# Patient Record
Sex: MALE | Race: BLACK OR AFRICAN AMERICAN | NOT HISPANIC OR LATINO | Employment: FULL TIME
[De-identification: names, ages, dates, MRNs, and addresses within clinical notes are randomized per-mention and may not be internally consistent; named-entity substitution may affect disease eponyms.]

---

## 2023-02-12 ENCOUNTER — HEALTH MAINTENANCE LETTER (OUTPATIENT)
Age: 28
End: 2023-02-12

## 2023-03-07 ENCOUNTER — OFFICE VISIT (OUTPATIENT)
Dept: FAMILY MEDICINE | Facility: CLINIC | Age: 28
End: 2023-03-07
Payer: COMMERCIAL

## 2023-03-07 VITALS
SYSTOLIC BLOOD PRESSURE: 138 MMHG | DIASTOLIC BLOOD PRESSURE: 84 MMHG | BODY MASS INDEX: 38.03 KG/M2 | OXYGEN SATURATION: 96 % | TEMPERATURE: 98 F | RESPIRATION RATE: 18 BRPM | WEIGHT: 256.8 LBS | HEART RATE: 76 BPM | HEIGHT: 69 IN

## 2023-03-07 DIAGNOSIS — Z87.440 HISTORY OF KIDNEY INFECTION: ICD-10-CM

## 2023-03-07 DIAGNOSIS — Z11.3 SCREENING FOR STDS (SEXUALLY TRANSMITTED DISEASES): ICD-10-CM

## 2023-03-07 DIAGNOSIS — Z00.00 ROUTINE GENERAL MEDICAL EXAMINATION AT A HEALTH CARE FACILITY: Primary | ICD-10-CM

## 2023-03-07 DIAGNOSIS — Z11.59 NEED FOR HEPATITIS C SCREENING TEST: ICD-10-CM

## 2023-03-07 DIAGNOSIS — Z13.1 SCREENING FOR DIABETES MELLITUS: ICD-10-CM

## 2023-03-07 DIAGNOSIS — R06.83 SNORES: ICD-10-CM

## 2023-03-07 DIAGNOSIS — E66.812 CLASS 2 OBESITY DUE TO EXCESS CALORIES WITHOUT SERIOUS COMORBIDITY WITH BODY MASS INDEX (BMI) OF 38.0 TO 38.9 IN ADULT: ICD-10-CM

## 2023-03-07 DIAGNOSIS — F43.9 STRESS: ICD-10-CM

## 2023-03-07 DIAGNOSIS — Z13.220 SCREENING CHOLESTEROL LEVEL: ICD-10-CM

## 2023-03-07 DIAGNOSIS — D56.9 THALASSEMIA, UNSPECIFIED TYPE: ICD-10-CM

## 2023-03-07 DIAGNOSIS — Z11.4 SCREENING FOR HIV (HUMAN IMMUNODEFICIENCY VIRUS): ICD-10-CM

## 2023-03-07 DIAGNOSIS — E66.09 CLASS 2 OBESITY DUE TO EXCESS CALORIES WITHOUT SERIOUS COMORBIDITY WITH BODY MASS INDEX (BMI) OF 38.0 TO 38.9 IN ADULT: ICD-10-CM

## 2023-03-07 LAB
ANION GAP SERPL CALCULATED.3IONS-SCNC: 12 MMOL/L (ref 7–15)
BUN SERPL-MCNC: 12 MG/DL (ref 6–20)
CALCIUM SERPL-MCNC: 9.6 MG/DL (ref 8.6–10)
CHLORIDE SERPL-SCNC: 107 MMOL/L (ref 98–107)
CHOLEST SERPL-MCNC: 179 MG/DL
CREAT SERPL-MCNC: 1.24 MG/DL (ref 0.67–1.17)
DEPRECATED HCO3 PLAS-SCNC: 22 MMOL/L (ref 22–29)
ERYTHROCYTE [DISTWIDTH] IN BLOOD BY AUTOMATED COUNT: 18.6 % (ref 10–15)
GFR SERPL CREATININE-BSD FRML MDRD: 81 ML/MIN/1.73M2
GLUCOSE SERPL-MCNC: 105 MG/DL (ref 70–99)
HCT VFR BLD AUTO: 37 % (ref 40–53)
HDLC SERPL-MCNC: 45 MG/DL
HGB BLD-MCNC: 11.9 G/DL (ref 13.3–17.7)
LDLC SERPL CALC-MCNC: 117 MG/DL
MCH RBC QN AUTO: 22.1 PG (ref 26.5–33)
MCHC RBC AUTO-ENTMCNC: 32.2 G/DL (ref 31.5–36.5)
MCV RBC AUTO: 69 FL (ref 78–100)
NONHDLC SERPL-MCNC: 134 MG/DL
PLATELET # BLD AUTO: 227 10E3/UL (ref 150–450)
POTASSIUM SERPL-SCNC: 4.3 MMOL/L (ref 3.4–5.3)
RBC # BLD AUTO: 5.38 10E6/UL (ref 4.4–5.9)
SODIUM SERPL-SCNC: 141 MMOL/L (ref 136–145)
TRIGL SERPL-MCNC: 84 MG/DL
WBC # BLD AUTO: 4.1 10E3/UL (ref 4–11)

## 2023-03-07 PROCEDURE — 86780 TREPONEMA PALLIDUM: CPT | Performed by: FAMILY MEDICINE

## 2023-03-07 PROCEDURE — 99385 PREV VISIT NEW AGE 18-39: CPT | Performed by: FAMILY MEDICINE

## 2023-03-07 PROCEDURE — 36415 COLL VENOUS BLD VENIPUNCTURE: CPT | Performed by: FAMILY MEDICINE

## 2023-03-07 PROCEDURE — 85027 COMPLETE CBC AUTOMATED: CPT | Performed by: FAMILY MEDICINE

## 2023-03-07 PROCEDURE — 87591 N.GONORRHOEAE DNA AMP PROB: CPT | Performed by: FAMILY MEDICINE

## 2023-03-07 PROCEDURE — 80061 LIPID PANEL: CPT | Performed by: FAMILY MEDICINE

## 2023-03-07 PROCEDURE — 80048 BASIC METABOLIC PNL TOTAL CA: CPT | Performed by: FAMILY MEDICINE

## 2023-03-07 PROCEDURE — 87389 HIV-1 AG W/HIV-1&-2 AB AG IA: CPT | Performed by: FAMILY MEDICINE

## 2023-03-07 PROCEDURE — 86803 HEPATITIS C AB TEST: CPT | Performed by: FAMILY MEDICINE

## 2023-03-07 PROCEDURE — 87491 CHLMYD TRACH DNA AMP PROBE: CPT | Performed by: FAMILY MEDICINE

## 2023-03-07 ASSESSMENT — PAIN SCALES - GENERAL: PAINLEVEL: NO PAIN (0)

## 2023-03-07 ASSESSMENT — ENCOUNTER SYMPTOMS
NERVOUS/ANXIOUS: 0
COUGH: 0
WEAKNESS: 0
DIZZINESS: 0
EYE PAIN: 0
HEMATOCHEZIA: 0
SHORTNESS OF BREATH: 0
PARESTHESIAS: 0
DYSURIA: 0
CONSTIPATION: 0
HEMATURIA: 0
ABDOMINAL PAIN: 0
PALPITATIONS: 0
JOINT SWELLING: 0
HEARTBURN: 0
FEVER: 0
DIARRHEA: 0
NAUSEA: 0
SORE THROAT: 0
FREQUENCY: 0
CHILLS: 0
HEADACHES: 0
MYALGIAS: 0
ARTHRALGIAS: 0

## 2023-03-07 NOTE — PROGRESS NOTES
SUBJECTIVE:   CC: Sofi is an 27 year old who presents for preventative health visit.   Patient has been advised of split billing requirements and indicates understanding: Yes  Healthy Habits:     Getting at least 3 servings of Calcium per day:  NO    Bi-annual eye exam:  NO    Dental care twice a year:  Yes    Sleep apnea or symptoms of sleep apnea:  Excessive snoring    Diet:  Regular (no restrictions)    Frequency of exercise:  2-3 days/week    Duration of exercise:  45-60 minutes    Taking medications regularly:  Yes    Medication side effects:  None    PHQ-2 Total Score: 1    Additional concerns today:  Yes    He describes having a history of thalassemia, snoring and he reports having about 4 kidney infections over the past 5 years.  He does not know why he has been getting the kidney infections.  He does not have a history of kidney stones that he is aware of.  He reports that his girlfriend has been telling him that he snores very loudly.  She has not mentioned that he stops breathing at night.    Social history: Single.  No children.  He recently moved to the [facility] from [place] where he grew up.  He works at a [employer] center.            Today's PHQ-2 Score:   PHQ-2 ( 1999 Pfizer) 3/7/2023   Q1: Little interest or pleasure in doing things 1   Q2: Feeling down, depressed or hopeless 0   PHQ-2 Score 1   Q1: Little interest or pleasure in doing things Several days   Q2: Feeling down, depressed or hopeless Not at all   PHQ-2 Score 1       Have you ever done Advance Care Planning? (For example, a Health Directive, POLST, or a discussion with a medical provider or your loved ones about your wishes): No, advance care planning information given to patient to review.  Patient declined advance care planning discussion at this time.    Social History     Tobacco Use     Smoking status: Never     Smokeless tobacco: Never   Substance Use Topics     Alcohol use: Not on file         Alcohol Use  "3/7/2023   Prescreen: >3 drinks/day or >7 drinks/week? No   No flowsheet data found.    Last PSA: No results found for: PSA    Reviewed orders with patient. Reviewed health maintenance and updated orders accordingly - Yes  Lab work is in process    Reviewed and updated as needed this visit by clinical staff   Tobacco  Allergies  Meds              Reviewed and updated as needed this visit by Provider     Meds                 Review of Systems   Constitutional: Negative for chills and fever.   HENT: Positive for ear pain. Negative for congestion, hearing loss and sore throat.    Eyes: Negative for pain and visual disturbance.   Respiratory: Negative for cough and shortness of breath.    Cardiovascular: Negative for chest pain, palpitations and peripheral edema.   Gastrointestinal: Negative for abdominal pain, constipation, diarrhea, heartburn, hematochezia and nausea.   Genitourinary: Negative for dysuria, frequency, genital sores, hematuria, impotence, penile discharge and urgency.   Musculoskeletal: Negative for arthralgias, joint swelling and myalgias.   Skin: Negative for rash.   Neurological: Negative for dizziness, weakness, headaches and paresthesias.   Psychiatric/Behavioral: Negative for mood changes. The patient is not nervous/anxious.          OBJECTIVE:   BP (!) 144/85   Pulse 76   Temp 98  F (36.7  C) (Temporal)   Resp 18   Ht 1.74 m (5' 8.5\")   Wt 116.5 kg (256 lb 12.8 oz)   SpO2 96%   BMI 38.48 kg/m      Physical Exam  GENERAL: healthy, alert and no distress  EYES: Eyes grossly normal to inspection, PERRL and conjunctivae and sclerae normal  HENT: ear canals and TM's normal, nose and mouth without ulcers or lesions  NECK: no adenopathy, no asymmetry, masses, or scars and thyroid normal to palpation  RESP: lungs clear to auscultation - no rales, rhonchi or wheezes  CV: regular rate and rhythm, normal S1 S2, no S3 or S4, no murmur, click or rub, no peripheral edema and peripheral pulses " strong  ABDOMEN: soft, nontender, no hepatosplenomegaly, no masses and bowel sounds normal  MS: no gross musculoskeletal defects noted, no edema  SKIN: no suspicious lesions or rashes  NEURO: Normal strength and tone, mentation intact and speech normal  PSYCH: mentation appears normal, affect normal/bright    Diagnostic Test Results:  Labs reviewed in Epic    ASSESSMENT/PLAN:   1. Routine general medical examination at a health care facility  I encouraged him to get regular exercise and eat a healthy diet.  We are going to check fasting labs as listed below.    2. Thalassemia, unspecified type  He is not sure what type of thalassemia that he has, but reports that some of his hemogram results are usually out of the normal range.  - CBC with platelets; Future  - CBC with platelets    3. Snores  I recommended he be evaluated at the sleep clinic for snoring symptoms.  He also has borderline elevated blood pressure.  - Adult Sleep Eval & Management  Referral; Future    4. History of kidney infection  We will be checking a basic metabolic panel as part of his lab work-up.  - Basic metabolic panel  (Ca, Cl, CO2, Creat, Gluc, K, Na, BUN); Future  - Basic metabolic panel  (Ca, Cl, CO2, Creat, Gluc, K, Na, BUN)    5. Stress  He is interested in seeing a therapist for this.  - Adult Mental Health  Referral; Future    6. Screening for HIV (human immunodeficiency virus)  - HIV Antigen Antibody Combo; Future  - HIV Antigen Antibody Combo    7. Need for hepatitis C screening test  - Hepatitis C Screen Reflex to HCV RNA Quant and Genotype; Future  - Hepatitis C Screen Reflex to HCV RNA Quant and Genotype    8. Screening cholesterol level  - Lipid Profile (Chol, Trig, HDL, LDL calc); Future  - Lipid Profile (Chol, Trig, HDL, LDL calc)    9. Screening for diabetes mellitus  - Basic metabolic panel  (Ca, Cl, CO2, Creat, Gluc, K, Na, BUN); Future  - Basic metabolic panel  (Ca, Cl, CO2, Creat, Gluc, K, Na, BUN)    10.  Screening for STDs (sexually transmitted diseases)  - Chlamydia & Gonorrhea by PCR, [facility] - Clinic Collect  - Treponema Abs w Reflex to RPR and Titer; Future  - Treponema Abs w Reflex to RPR and Titer    11. Class 2 obesity due to excess calories without serious comorbidity with body mass index (BMI) of 38.0 to 38.9 in adult  I recommended he work on lifestyle modifications to help with weight loss.      Patient has been advised of split billing requirements and indicates understanding: Yes      COUNSELING:   Reviewed preventive health counseling, as reflected in patient instructions       Regular exercise       Healthy diet/nutrition       Consider Hep C screening for all patients one time for ages 18-79 years       HIV screeninx in teen years, 1x in adult years, and at intervals if high risk        He reports that he has never smoked. He has never used smokeless tobacco.            John Novak, DO  [facility]

## 2023-03-08 LAB
C TRACH DNA SPEC QL PROBE+SIG AMP: NEGATIVE
HCV AB SERPL QL IA: NONREACTIVE
HIV 1+2 AB+HIV1 P24 AG SERPL QL IA: NONREACTIVE
N GONORRHOEA DNA SPEC QL NAA+PROBE: NEGATIVE
T PALLIDUM AB SER QL: NONREACTIVE

## 2023-03-09 ENCOUNTER — TELEPHONE (OUTPATIENT)
Dept: NEPHROLOGY | Facility: CLINIC | Age: 28
End: 2023-03-09
Payer: COMMERCIAL

## 2023-03-09 DIAGNOSIS — N28.9 RENAL IMPAIRMENT: Primary | ICD-10-CM

## 2023-03-09 DIAGNOSIS — R79.89 ELEVATED SERUM CREATININE: Primary | ICD-10-CM

## 2023-03-09 NOTE — TELEPHONE ENCOUNTER
Health Call Center    Phone Message    May a detailed message be left on voicemail: yes    Reason for Call: Order(s): Other:   Reason for requested: labs  Date needed: ASAP  Provider name: Juliette    Pt called and is scheduled for labs on 3/20. He just recently had labs done at his pcp's office and he is wanting to know if those labs would be sufficient or if he would need to do labs again on 3/20. Please reach out to pt to confirm if appt is needed and help him cancel appt it it's not. Thanks    Action Taken: Message routed to:  [facility] Neph    Travel Screening: Not Applicable

## 2023-03-17 NOTE — TELEPHONE ENCOUNTER
DIAGNOSIS: Renal impairment    DATE RECEIVED: 03.30.2023   NOTES STATUS DETAILS   OFFICE NOTE from referring provider Internal 03.09.2023 John Douglas,    OFFICE NOTE from other specialist      *Only VASCULITIS or LUPUS gather office notes for the following     *PULMONARY       *ENT     *DERMATOLOGY     *RHEUMATOLOGY     DISCHARGE SUMMARY from hospital     DISCHARGE REPORT from the ER     MEDICATION LIST     IMAGING  (NEED IMAGES AND REPORTS)     KIDNEY CT SCAN     KIDNEY ULTRASOUND     MR ABDOMEN     NUCLEAR MEDICINE RENAL     LABS     CBC Internal 03.07.2023   CMP Internal 03.07.2023   BMP Internal 03.07.2023   UA     URINE PROTEIN     RENAL PANEL     BIOPSY     KIDNEY BIOPSY

## 2023-03-20 ENCOUNTER — LAB (OUTPATIENT)
Dept: LAB | Facility: CLINIC | Age: 28
End: 2023-03-20
Payer: COMMERCIAL

## 2023-03-20 DIAGNOSIS — R79.89 ELEVATED SERUM CREATININE: ICD-10-CM

## 2023-03-20 LAB
ALBUMIN MFR UR ELPH: 19.3 MG/DL (ref 1–14)
ALBUMIN UR-MCNC: NEGATIVE MG/DL
APPEARANCE UR: CLEAR
BILIRUB UR QL STRIP: NEGATIVE
COLOR UR AUTO: ABNORMAL
CREAT UR-MCNC: 404 MG/DL
CREAT UR-MCNC: 413 MG/DL
GLUCOSE UR STRIP-MCNC: NEGATIVE MG/DL
HGB UR QL STRIP: NEGATIVE
KETONES UR STRIP-MCNC: NEGATIVE MG/DL
LEUKOCYTE ESTERASE UR QL STRIP: NEGATIVE
MICROALBUMIN UR-MCNC: <12 MG/L
MICROALBUMIN/CREAT UR: NORMAL MG/G{CREAT}
MUCOUS THREADS #/AREA URNS LPF: PRESENT /LPF
NITRATE UR QL: NEGATIVE
PH UR STRIP: 5.5 [PH] (ref 5–7)
PROT/CREAT 24H UR: 0.05 MG/MG CR (ref 0–0.2)
RBC URINE: <1 /HPF
SP GR UR STRIP: 1.01 (ref 1–1.03)
UROBILINOGEN UR STRIP-MCNC: NORMAL MG/DL
WBC URINE: 4 /HPF

## 2023-03-20 PROCEDURE — 81001 URINALYSIS AUTO W/SCOPE: CPT | Performed by: PATHOLOGY

## 2023-03-20 PROCEDURE — 84156 ASSAY OF PROTEIN URINE: CPT | Performed by: PATHOLOGY

## 2023-03-20 PROCEDURE — 82570 ASSAY OF URINE CREATININE: CPT | Performed by: STUDENT IN AN ORGANIZED HEALTH CARE EDUCATION/TRAINING PROGRAM

## 2023-03-29 NOTE — PROGRESS NOTES
Nephrology Clinic Visit  Sofi Escobedo MRN: [id number] YOB: 1995  Primary Care Provider: No Ref-Primary, Physician    Video-Visit Details  Patient evaluated by billable video visit  Video Start Time: 8:00AM  Type of service:  Video Visit  Video End Time:8:36 AM  Originating Location (pt. Location): Home  Distant Location (provider location):  Off-site  Platform used for Video Visit: [employer]  ----------------------------------------------------------------------------------------------------------------------  Visit 3/30/2023:  -BP Control: He does have a diagnosis of high blood pressure. He thinks this has been the case for quite some time. BP recently 180/86, 150/93.   --Current Regimen: None, has not been on BP medications in the past either.   -DM Control: No  --Current Regimen: None  -Creatinine Trend: 1.2  -Nocturia: 2-3x   -Hematuria:  -Nephrolithiasis: No  -NSAID Use: No  -Herbal/OTC Medication Use: Olli sleep gummies, Magnesium, Fish oil, Green Food Powder, Multivitamin  -Family History of Kidney Disease: HTN (Cousin w/ HTN, Strokes, CKD), no one else that he can think  -Family/Friends on RRT/Kidney Transplant: No/No    -Other:  --Hx of Thalassemia: He doesn't know much about this. Thinks he was diagnosed when he was 15 or so. Mom's side of the family has this.   --Hx of iron overload:  --2 years ago in [facility] in he had COVID. At that time he had tests done and Erik reported that there was something wrong with his kidneys. He's also had issues with Urinary Tract infection/Flank Pain. He's noted that on occasion. He's also had some lower abdominal discomfort when urinating. The last time this happened was about 2 years ago. He had some fevers associated. He was treated with antibiotics and after 2 days started to feel better. He reports gross hematuria (dark brown urine when his stream starts then normal color then maybe passing some blood clots). Symptoms always occur in  conjunction with urinary tract infections. His last UTI was about 2 years.     Objective:  PAST MEDICAL HISTORY:  No past medical history on file.    PAST SURGICAL HISTORY:  Past Surgical History:   Procedure Laterality Date     ANKLE SURGERY Right 2019    Ligament surgery       MEDICATIONS:  No current outpatient medications    FAMILY MEDICAL HISTORY:   Family History   Problem Relation Age of Onset     Hypertension Mother      Hypertension Father      Diabetes Paternal Grandmother      Hypertension Paternal Grandmother      Diabetes Paternal Grandfather      Hypertension Paternal Uncle      Hypertension Paternal Aunt        PHYSICAL EXAM:   There were no vitals taken for this visit.  GENERAL APPEARANCE: alert and no distress  EYES: nonicteric  HENT: mouth without ulcers or lesions  RESP: lungs clear to auscultation   CV: regular rhythm, normal rate, no rub  ABDOMEN: soft, nontender, normal bowel sounds, no HSM   Extremities: no clubbing, cyanosis, or edema  MS: no evidence of inflammation in joints, no muscle tenderness  SKIN: no rash  NEURO: mentation intact and speech normal  PSYCH: affect normal    LABS REVIEWED BY ME:   ANEMIA  Recent Labs   Lab Test 03/07/23  1240   HGB 11.9*       BMP  Recent Labs   Lab Test 03/07/23  1240      POTASSIUM 4.3   CHLORIDE 107   CO2 22   ANIONGAP 12   BUN 12.0   CR 1.24*   GFRESTIMATED 81       CBC  Recent Labs   Lab Test 03/07/23  1240   HGB 11.9*   WBC 4.1   HCT 37.0*   MCV 69*          DIABETESNo lab results found.    HYPONATREMIANo lab results found.    Invalid input(s): UOSM, OSM    MBD  Recent Labs   Lab Test 03/07/23  1240   SUELLEN 9.6        URINE STUDIES  Recent Labs   Lab Test 03/20/23  1124   COLOR Light Yellow   APPEARANCE Clear   URINEGLC Negative   URINEBILI Negative   URINEKETONE Negative   SG 1.015   UBLD Negative   URINEPH 5.5   PROTEIN Negative   NITRITE Negative   LEUKEST Negative   RBCU <1   WBCU 4     No lab results found.    ADDITIONAL LABS  "ORDERED/REVIEWED BY ME:  See below    Assessment/Plan  CKD Stage G2A1  Established care with [facility] 3/30/2023    Referred for Creatinine of 1.2 on 3/7/2023. He has no proteinuria/albuminuria on 3/20/2023. He has no hematuria/pyuria on UA 3/20/2023. No renal imaging at time of referral. Hx of recurrent UTIs and 2-3x per night nocturia.     Hx of Thalassemia    CKD Etiology (Biopsy Proven: No):  -Hypertensive Nephrosclerosis  -?Recurrent UTIs?  -?Underestimation of eGFR via Creatinine?  -?Anatomic Abnormality leading to recurrent UTIs/Urinary retention?    Plan:  -Need to get BP under control. He reports BP readings in the 150's and having high blood pressure for \"a long time\". He does not have a home cuff. We are going to start a low dose of Nifedpine (30mg qDay) and he has been instructed to get a home blood pressure cuff with a large arm cuff to get accurate BP measures at home. He will send me these readings in 2 weeks for titration if needed.   -Will check a cystatin-c with next routine lab draw  -Obtain renal US w/ PVR to eval renal anatomy and eval for urinary retention  -Avoid NSAIDs/nephrotoxic agetns  -If develops proteinuric kidney disease, start SGLT2i      Anemia of Renal Disease  Hemoglobin: 11.9  Ferritin:  TSAT:    Plan:  -Hx of Thalassemia. No need for IV iron/EPO at this time      Lipid Management in CKD  KDIGO 2013 Guidelines recommend the following for patients with CKD:  Adults >/= 49 yo w/ CKD stage 1 or 2: Statin  Adults >/= 49 yo w/ CKD stage 3-5: Statin + Ezetimibe or Statin alone  Adults 18-49 + 1 of the following (CAD, DM, Ischemic stroke, 10 yr ASCVD risk >10%): Statin    KDIGO guidelines recommend Simvastatin 20mg/Ezetimibe 10mg qDay for patients with CKD G3a-G5 (in line with the SHARP trial). If Simvastatin used alone, 40mg qDay is referenced.   Other options include: Atorvastatin 20mg qDay (4D trial) and Rosuvastatin 10mg qDay (RCYSTAL trial)    Plan:  -No indication for Statin at " this time      Metabolic Acidosis of Renal Disease  Bicarb: 22    Plan:  -No need for NaHCO at this time      Mineral Bone Disease  PTH: N/A  Phos:  Calcium:  Vitamin D:    Plan:  -No need for phos binder at this time    Other:  -Specialty Care Coordination Referral - CKD G1-G3b w/o imminent RRT planning/needs (Yes/no, Date Referral Placed): No  -Med Therapy Management Referral (Yes/No, Date of Referral): No    Return to clinic: 2 months    Yosef Segovia MD   of Medicine  Division of Nephrology and Hypertension  [facility]    50 minutes spent on the date of the encounter doing chart review, history and exam, documentation and further activities as noted above

## 2023-03-30 ENCOUNTER — PRE VISIT (OUTPATIENT)
Dept: NEPHROLOGY | Facility: CLINIC | Age: 28
End: 2023-03-30
Payer: COMMERCIAL

## 2023-03-30 ENCOUNTER — VIRTUAL VISIT (OUTPATIENT)
Dept: NEPHROLOGY | Facility: CLINIC | Age: 28
End: 2023-03-30
Attending: STUDENT IN AN ORGANIZED HEALTH CARE EDUCATION/TRAINING PROGRAM
Payer: COMMERCIAL

## 2023-03-30 DIAGNOSIS — N28.9 RENAL IMPAIRMENT: ICD-10-CM

## 2023-03-30 PROCEDURE — 99204 OFFICE O/P NEW MOD 45 MIN: CPT | Mod: VID | Performed by: STUDENT IN AN ORGANIZED HEALTH CARE EDUCATION/TRAINING PROGRAM

## 2023-03-30 RX ORDER — NIFEDIPINE 30 MG
30 TABLET, EXTENDED RELEASE ORAL DAILY
Qty: 90 TABLET | Refills: 3 | Status: SHIPPED | OUTPATIENT
Start: 2023-03-30 | End: 2023-06-28

## 2023-03-30 NOTE — LETTER
3/30/2023       RE: Sofi Escobedo  [address]  [facility] [address]     Dear Colleague,    Thank you for referring your patient, Sofi Escobedo, to the [facility] [place] [facility]. Please see a copy of my visit note below.        Nephrology Clinic Visit  Sofi Escobedo MRN: [id number] YOB: 1995  Primary Care Provider: No Ref-Primary, Physician    Video-Visit Details  Patient evaluated by billable video visit  Video Start Time: 8:00AM  Type of service:  Video Visit  Video End Time:8:36 AM  Originating Location (pt. Location): Home  Distant Location (provider location):  Off-site  Platform used for Video Visit: [employer]  ----------------------------------------------------------------------------------------------------------------------  Visit 3/30/2023:  -BP Control: He does have a diagnosis of high blood pressure. He thinks this has been the case for quite some time. BP recently 180/86, 150/93.   --Current Regimen: None, has not been on BP medications in the past either.   -DM Control: No  --Current Regimen: None  -Creatinine Trend: 1.2  -Nocturia: 2-3x   -Hematuria:  -Nephrolithiasis: No  -NSAID Use: No  -Herbal/OTC Medication Use: Olli sleep gummies, Magnesium, Fish oil, Green Food Powder, Multivitamin  -Family History of Kidney Disease: HTN (Cousin w/ HTN, Strokes, CKD), no one else that he can think  -Family/Friends on RRT/Kidney Transplant: No/No    -Other:  --Hx of Thalassemia: He doesn't know much about this. Thinks he was diagnosed when he was 15 or so. Mom's side of the family has this.   --Hx of iron overload:  --2 years ago in [facility] in he had COVID. At that time he had tests done and Dr's reported that there was something wrong with his kidneys. He's also had issues with Urinary Tract infection/Flank Pain. He's noted that on occasion. He's also had some lower abdominal  discomfort when urinating. The last time this happened was about 2 years ago. He had some fevers associated. He was treated with antibiotics and after 2 days started to feel better. He reports gross hematuria (dark brown urine when his stream starts then normal color then maybe passing some blood clots). Symptoms always occur in conjunction with urinary tract infections. His last UTI was about 2 years.     Objective:  PAST MEDICAL HISTORY:  No past medical history on file.    PAST SURGICAL HISTORY:  Past Surgical History:   Procedure Laterality Date     ANKLE SURGERY Right 2019    Ligament surgery       MEDICATIONS:  No current outpatient medications    FAMILY MEDICAL HISTORY:   Family History   Problem Relation Age of Onset     Hypertension Mother      Hypertension Father      Diabetes Paternal Grandmother      Hypertension Paternal Grandmother      Diabetes Paternal Grandfather      Hypertension Paternal Uncle      Hypertension Paternal Aunt        PHYSICAL EXAM:   There were no vitals taken for this visit.  GENERAL APPEARANCE: alert and no distress  EYES: nonicteric  HENT: mouth without ulcers or lesions  RESP: lungs clear to auscultation   CV: regular rhythm, normal rate, no rub  ABDOMEN: soft, nontender, normal bowel sounds, no HSM   Extremities: no clubbing, cyanosis, or edema  MS: no evidence of inflammation in joints, no muscle tenderness  SKIN: no rash  NEURO: mentation intact and speech normal  PSYCH: affect normal    LABS REVIEWED BY ME:   ANEMIA  Recent Labs   Lab Test 03/07/23  1240   HGB 11.9*       BMP  Recent Labs   Lab Test 03/07/23  1240      POTASSIUM 4.3   CHLORIDE 107   CO2 22   ANIONGAP 12   BUN 12.0   CR 1.24*   GFRESTIMATED 81       CBC  Recent Labs   Lab Test 03/07/23  1240   HGB 11.9*   WBC 4.1   HCT 37.0*   MCV 69*          DIABETESNo lab results found.    HYPONATREMIANo lab results found.    Invalid input(s): UOSM, OSM    MBD  Recent Labs   Lab Test 03/07/23  1240   SUELLEN 9.6  "       URINE STUDIES  Recent Labs   Lab Test 03/20/23  1124   COLOR Light Yellow   APPEARANCE Clear   URINEGLC Negative   URINEBILI Negative   URINEKETONE Negative   SG 1.015   UBLD Negative   URINEPH 5.5   PROTEIN Negative   NITRITE Negative   LEUKEST Negative   RBCU <1   WBCU 4     No lab results found.    ADDITIONAL LABS ORDERED/REVIEWED BY ME:  See below    Assessment/Plan  CKD Stage G2A1  Establ[facility] care with [facility] 3/30/2023    Referred for Creatinine of 1.2 on 3/7/2023. He has no proteinuria/albuminuria on 3/20/2023. He has no hematuria/pyuria on UA 3/20/2023. No renal imaging at time of referral. Hx of recurrent UTIs and 2-3x per night nocturia.     Hx of Thalassemia    CKD Etiology (Biopsy Proven: No):  -Hypertensive Nephrosclerosis  -?Recurrent UTIs?  -?Underestimation of eGFR via Creatinine?  -?Anatomic Abnormality leading to recurrent UTIs/Urinary retention?    Plan:  -Need to get BP under control. He reports BP readings in the 150's and having high blood pressure for \"a long time\". He does not have a home cuff. We are going to start a low dose of Nifedpine (30mg qDay) and he has been instructed to get a home blood pressure cuff with a large arm cuff to get accurate BP measures at home. He will send me these readings in 2 weeks for titration if needed.   -Will check a cystatin-c with next routine lab draw  -Obtain renal US w/ PVR to eval renal anatomy and eval for urinary retention  -Avoid NSAIDs/nephrotoxic agetns  -If develops proteinuric kidney disease, start SGLT2i      Anemia of Renal Disease  Hemoglobin: 11.9  Ferritin:  TSAT:    Plan:  -Hx of Thalassemia. No need for IV iron/EPO at this time      Lipid Management in CKD  KDIGO 2013 Guidelines recommend the following for patients with CKD:  Adults >/= 51 yo w/ CKD stage 1 or 2: Statin  Adults >/= 51 yo w/ CKD stage 3-5: Statin + Ezetimibe or Statin alone  Adults 18-49 + 1 of the following (CAD, DM, Ischemic stroke, 10 yr ASCVD risk >10%): " Statin    KDIGO guidelines recommend Simvastatin 20mg/Ezetimibe 10mg qDay for patients with CKD G3a-G5 (in line with the SHARP trial). If Simvastatin used alone, 40mg qDay is referenced.   Other options include: Atorvastatin 20mg qDay (4D trial) and Rosuvastatin 10mg qDay (CRYSTAL trial)    Plan:  -No indication for Statin at this time      Metabolic Acidosis of Renal Disease  Bicarb: 22    Plan:  -No need for NaHCO at this time      Mineral Bone Disease  PTH: N/A  Phos:  Calcium:  Vitamin D:    Plan:  -No need for phos binder at this time    Other:  -Specialty Care Coordination Referral - CKD G1-G3b w/o imminent RRT planning/needs (Yes/no, Date Referral Placed): No  -Med Therapy Management Referral (Yes/No, Date of Referral): No    Return to clinic: 2 months    Yosef Segovia MD   of Medicine  Division of Nephrology and Hypertension  [facility]

## 2023-03-30 NOTE — PATIENT INSTRUCTIONS
"It was a pleasure to see you in nephrology clinic today. I've included a brief summary of our discussion and care plan from today's visit below.  _______________________________________________________________________    My recommendations are summarized as follows:  -Keep a Blood Pressure log. Please make sure that you are using a validated blood pressure device (check \"www.validatebp.org\").  -Avoid all NSAID's. Examples include Ibuprofen (Advil, Motrin), naprosyn (Aleve), celebrex among others. Acetaminophen (Tylenol) is ok with maximum dose in 24 hours of 3000mg.  -Healthy lifestyle measures will keep your kidney's functioning at their current best. This includes regular exercise, maintaining a healthy body weight and smoking cessation.   -Please obtain a Blood Pressure cuff that is on the ValidateBP list. Please make sure that it has an extra large cuff to go around your arm. If the cuff going around your arm isn't the right size we won't get accurate blood pressure readings.   -I have sent a prescription for Nifedipine 30mg 1x per day to your pharmacy. Please watch for leg swelling with this medication  -I have requested a renal ultrasound be done for you. Please schedule this in the next few weeks.     Please return to Nephrology Clinic in 2 months to review your progress.     Who do I call with any questions after my visit?  There are multiple ways to contact your nephrology care team:    -To schedule or reschedule an appointment, please call [phone].  -Reach out via [employer]. These messages are answered by your nurse or doctor during business hours and typically in 1-2 days. [employer] messages are best for quick questions/clarifications/updates. Frequently, your doctor or nurse will recommend setting up a follow up appointment to address any significant questions/concerns.  -For urgent questions after business hours, you may reach the on-call Nephrology Fellow by contacting the [facility]  " at [phone].    To schedule imaging:   -Please call [phone]     To schedule your lab appointment at the [facility]:  -Please call [phone]    Sincerely,    Dr. Yosef Segovia   of Medicine  Division of Nephrology and Hypertension  [facility]

## 2023-03-30 NOTE — NURSING NOTE
Is the patient currently in the state of [place]? YES    Visit mode:VIDEO    If the visit is dropped, the patient can be reconnected by: VIDEO VISIT: Text to cell phone: [phone]    Will anyone else be joining the visit? NO      How would you like to obtain your AVS? MyChart    Are changes needed to the allergy or medication list? NO    Reason for visit: New pt & go over test results

## 2023-04-04 ENCOUNTER — TELEPHONE (OUTPATIENT)
Dept: NEPHROLOGY | Facility: CLINIC | Age: 28
End: 2023-04-04
Payer: COMMERCIAL

## 2023-04-04 NOTE — TELEPHONE ENCOUNTER
Sent [employer] (1st Attempt) for the patient to call back and schedule the following:    Appointment type: 2 month follow up   Provider: Juliette  Return date: 6/2023  Specialty phone number: [phone]  Additional appointment(s) needed: lab  Additonal Notes: n/a

## 2023-04-10 ENCOUNTER — ANCILLARY PROCEDURE (OUTPATIENT)
Dept: ULTRASOUND IMAGING | Facility: CLINIC | Age: 28
End: 2023-04-10
Attending: STUDENT IN AN ORGANIZED HEALTH CARE EDUCATION/TRAINING PROGRAM
Payer: COMMERCIAL

## 2023-04-10 DIAGNOSIS — N28.9 RENAL IMPAIRMENT: ICD-10-CM

## 2023-04-10 PROCEDURE — 76770 US EXAM ABDO BACK WALL COMP: CPT | Mod: GC | Performed by: STUDENT IN AN ORGANIZED HEALTH CARE EDUCATION/TRAINING PROGRAM

## 2023-04-12 ASSESSMENT — PATIENT HEALTH QUESTIONNAIRE - PHQ9
10. IF YOU CHECKED OFF ANY PROBLEMS, HOW DIFFICULT HAVE THESE PROBLEMS MADE IT FOR YOU TO DO YOUR WORK, TAKE CARE OF THINGS AT HOME, OR GET ALONG WITH OTHER PEOPLE: NOT DIFFICULT AT ALL
SUM OF ALL RESPONSES TO PHQ QUESTIONS 1-9: 5
SUM OF ALL RESPONSES TO PHQ QUESTIONS 1-9: 5

## 2023-04-13 ENCOUNTER — VIRTUAL VISIT (OUTPATIENT)
Dept: BEHAVIORAL HEALTH | Facility: HOSPITAL | Age: 28
End: 2023-04-13
Attending: FAMILY MEDICINE
Payer: COMMERCIAL

## 2023-04-13 DIAGNOSIS — F43.21 ADJUSTMENT DISORDER WITH DEPRESSED MOOD: Primary | ICD-10-CM

## 2023-04-13 DIAGNOSIS — F43.9 STRESS: ICD-10-CM

## 2023-04-13 PROCEDURE — 90837 PSYTX W PT 60 MINUTES: CPT | Mod: VID | Performed by: COUNSELOR

## 2023-04-13 NOTE — PROGRESS NOTES
"    [facility] Counseling      PATIENT'S NAME: Sofi Escobedo  PREFERRED NAME: Sofi  PRONOUNS:   Male    MRN: [id number]  : 1995  ADDRESS: [address] [place], [place] [id number]  [facility]  [id number]  DATE OF SERVICE: 23  START TIME: 2:00pm  END TIME: 2:55pm  PREFERRED PHONE: [phone]  May we leave a program related message: Yes  SERVICE MODALITY:  Video Visit:      Provider verified identity through the following two step process.  Patient provided:  Patient  and Patient address    Telemedicine Visit: The patient's condition can be safely assessed and treated via synchronous audio and visual telemedicine encounter.      Reason for Telemedicine Visit: Services only offered telehealth    Originating Site (Patient Location): Patient's home    Distant Site (Provider Location): Provider Remote Setting- Home Office    Consent:  The patient/guardian has verbally consented to: the potential risks and benefits of telemedicine (video visit) versus in person care; bill my insurance or make self-payment for services provided; and responsibility for payment of non-covered services.     Patient would like the video invitation sent by:  My Chart    Mode of Communication:  Video Conference via [employer]    Distant Location (Provider):  Off-site    As the provider I attest to compliance with applicable laws and regulations related to telemedicine.    UNIVERSAL ADULT Mental Health DIAGNOSTIC ASSESSMENT    Identifying Information:  Patient is a 27 year old,  individual.  Patient was [facility] for an [facility].  Patient attended the session alone.    Chief Complaint:   The reason for seeking services at this time is: \"Learning to not let things from my past get in my way and learning to live a better life.\". Pt recently realized that he tends to dwell on a specific past relationship and while he wants to have his own family, he finds that he " "has trust issues that get in the way. Pt moved to [place] from [place] about a year ago and has found himself falling back into his old patterns of going to work and not engaging in the other activities he wants to, even on days he has off. Pt moved to [place] to start over, finding that being in [place] reinforced unhelpful patterns and that because of his history there found himself falling back into spaces he didn't really waqnt to engage with. Pt reflected that a lot of his trust issues stem from family dynamics growing up where he felt abandoned or given up on when he tried to be independent. Pt described how this has made him feel like he needs to be \"wanted/appreciated\" and get external validation that he can't get from himself. Pt also recently realized he has been feeling progressively more lonely since the move. Pt also verbalized thinking that he is \"broken\" now because of his past and doesn't feel that he can connect with people now on a deeper level. Pt has also found the culture shift to been challenging too. The problem(s) began around 2020.    Patient has attempted to resolve these concerns in the past through self-help books, and exercise.    Social/Family History:  Patient reported they grew up in other [facility]. They were raised by biological father after his mom dropped them off with their twin sister at the age 7. Parents one or both remarried and still living. Pt has half siblings on both sides of the family, his older half brother is close to him, and he tries to stay in contact with his twin sister.  Patient reported that their childhood was abandoned, lonely, misunderstood/vulnerable, and rough (needing to be on the defense). Patient described their current relationships with family of origin as distant and tense.     The patient describes their cultural background as .  Cultural influences and impact on patient's life structure, values, norms, and healthcare: Urban culture.  " Contextual influences on patient's health include: Contextual Factors: Societal Factors related to his work in [place] and while he likes his work finds it to be generally unrewarding/stressfull. Pt also identified some interpersonal conflict with a couple co-workers which has been causing him some stress as well. These factors will be addressed in the Preliminary Treatment plan. Patient identified their preferred language to be English. Patient reported they does not need the assistance of an  or other support involved in therapy.     Patient reported had no significant delays in developmental tasks.   Patient's highest education level was some college.  Patient identified the following learning problems: none reported.  Modifications will not be used to assist communication in therapy. Patient reports they are  able to understand written materials.    Patient reported the following relationship history as one LTR.  Patient's current relationship status is single for 4-5 month.   Patient identified their sexual orientation as heterosexual.  Patient reported having 0 child(lolita). Patient identified family as part of their support system.  Patient identified the quality of these relationships as fair, though distant.      Patient's current living/housing situation involves staying in own home/apartment.  The immediate members of family and household include himself and they report that housing is stable.    Patient is currently employed fulltime.  Patient reports their finances are obtained through employment. Patient does identify finances as a current stressor.      Patient reported that they have been involved with the legal system.  June 2019 I received a DUI in [place]. No other legal issues before or since. Patient does not report being under probation/ parole/ jurisdiction. They are not under any current court jurisdiction. .    Patient's Strengths and Limitations:  Patient identified the  following strengths or resources that will help them succeed in treatment: commitment to health and well being, exercise routine, neda / spirituality, family support, insight, intelligence, motivation, strong social skills and work ethic. Things that may interfere with the patient's success in treatment include: few friends and lack of family support.     Assessments:  The following assessments were completed by patient for this visit:  PHQ2:       3/7/2023    11:25 AM 3/7/2023    11:24 AM   PHQ-2 ( 1999 [employer])   Q1: Little interest or pleasure in doing things 1 1   Q2: Feeling down, depressed or hopeless 0 0   PHQ-2 Score 1 1   Q1: Little interest or pleasure in doing things Several days Several days   Q2: Feeling down, depressed or hopeless Not at all Not at all   PHQ-2 Score 1 1     PHQ9:       4/12/2023     4:37 PM   PHQ-9 SCORE   PHQ-9 Total Score MyChart 5 (Mild depression)   PHQ-9 Total Score 5     GAD2:       4/12/2023     4:53 PM   BISHOP-2   Feeling nervous, anxious, or on edge 1   Not being able to stop or control worrying 1   BISHOP-2 Total Score 2     CAGE-AID:       4/13/2023     6:32 AM   CAGE-AID Total Score   Total Score 2   Total Score MyChart 2 (A total score of 2 or greater is considered clinically significant)     PROMIS 10-Global Health (only subscores and total score):       4/13/2023     6:31 AM   PROMIS-10 Scores Only   Global Mental Health Score 11   Global Physical Health Score 14   PROMIS TOTAL - SUBSCORES 25     Dell Suicide Severity Rating Scale (Lifetime/Recent)      4/18/2023     2:34 PM   Dell Suicide Severity Rating (Lifetime/Recent)   1. Wish to be Dead (Lifetime) Y   Wish to be Dead Description (Lifetime) older teen or young adult years   1. Wish to be Dead (Past 1 Month) N   2. Non-Specific Active Suicidal Thoughts (Lifetime) N   Frequency (Lifetime) 1   Duration (Lifetime) 1   Controllability (Past 1 Month) 1   Deterrents (Past 1 Month) 0   Reasons for Ideation (Lifetime) 0    Reasons for Ideation (Past 1 Month) 0   Actual Attempt (Lifetime) N   Interrupted Attempts (Lifetime) N   Aborted or Self-Interrupted Attempt (Lifetime) N   Preparatory Acts or Behavior (Lifetime) N   Calculated C-SSRS Risk Score (Lifetime/Recent) No Risk Indicated       Personal and Family Medical History:  Patient does not report a family history of mental health concerns.  Patient reports family history includes Diabetes in his paternal grandfather and paternal grandmother; Hypertension in his father, mother, paternal aunt, paternal grandmother, and paternal uncle..     Patient does report Mental Health Diagnosis and/or Treatment.  Patient Patient reported the following previous diagnoses which include(s): Nothing.  Patient reported symptoms began 2015 after a breakup.  Patient has not received mental health services in the past.  Psychiatric Hospitalizations: none.  Patient denies a history of civil commitment.  Currently, patient is not receiving other mental health services.  These include none.         Patient has had a physical exam to rule out medical causes for current symptoms.  Date of last physical exam was within the past year. Client was encouraged to follow up with PCP if symptoms were to develop. The patient has a [facility] Primary Care Provider, who is named No Ref-Primary, Physician..  Patient reports the following current medical concerns: high blood pressure and no current dental concerns.  Patient denies any issues with pain..   There are not significant appetite / nutritional concerns / weight changes.   Patient does report a history of head injury / trauma / cognitive impairment.  Pt reports falling and hitting his head on concrete as a kid, being knocked unconscious, and did not go to the hospital. Pt denies any issues post concussion or noticing a difference in his ability to think.    Current Outpatient Medications   Medication     NIFEdipine ER (ADALAT CC) 30 MG 24 hr tablet     No  current facility-administered medications for this visit.     Medication Adherence:  Patient reports not taking.  not taking psychiatric medications as prescribed. Client states reason for medication non-adherence as wanting to explore other avenues besides meds to address it. Strategies for addressing obstacles to medication adherence include needing to follow-up with PCP. Client accepted strategies to improve medication adherence.    Patient Allergies:  No Known Allergies    Medical History:  No past medical history on file.      Current Mental Status Exam:   Appearance:  Appropriate    Eye Contact:  Good   Psychomotor:  Normal       Gait / station:  Unable to assess  Attitude / Demeanor: Cooperative  Interested Friendly Pleasant  Speech      Rate / Production: Normal/ Responsive Talkative      Volume:  Normal  volume      Language:  intact  Mood:   Anxious  Normal  Affect:   Appropriate  Blunted    Thought Content: Clear   Thought Process: Coherent  Logical       Associations: No loosening of associations  Insight:   Fair  and Intellectual Insight  Judgment:  Intact   Orientation:  All  Attention/concentration: Good      Substance Use:  Patient did report a family history of substance use concerns; see medical history section for details. Pt reports that most men in his family have issues with Alcohol and other drug issues. Patient has not received chemical dependency treatment in the past.  Patient has not ever been to detox.     Patient is not currently receiving any chemical dependency treatment.           Substance History of use Age of first use Date of last use     Pattern and duration of use (include amounts and frequency)   Alcohol currently use   15 04/02/23 REPORTS SUBSTANCE USE: reports using substance 1 times per month and has 5 mixed drinks at a time.   Patient reports heaviest use was between [age].   Cannabis   used in the past 9 12/01/22 Pt did it socially and is now sober due to not liking it.      Amphetamines   never used     REPORTS SUBSTANCE USE: N/A   Cocaine/crack    never used       REPORTS SUBSTANCE USE: N/A   Hallucinogens never used         REPORTS SUBSTANCE USE: N/A   Inhalants never used         REPORTS SUBSTANCE USE: N/A   Heroin never used         REPORTS SUBSTANCE USE: N/A   Other Opiates never used     REPORTS SUBSTANCE USE: N/A   Benzodiazepine   never used     REPORTS SUBSTANCE USE: N/A   Barbiturates never used     REPORTS SUBSTANCE USE: N/A   Over the counter meds never used     REPORTS SUBSTANCE USE: N/A   Caffeine currently use N/A   REPORTS SUBSTANCE USE: reports using substance 2 times per day and has 1 coffee or energy drink at a time.   Patient reports heaviest use is current use.   Nicotine  currently use 24 04/12/23 REPORTS SUBSTANCE USE: reports using substance 1/5 if a cartridge times per day and has a lot at   at a time.   Patient reports heaviest use was [age].   Other substances not listed above:  Identify:  never used     REPORTS SUBSTANCE USE: N/A     Patient reported the following problems as a result of their substance use: financial problems; relationship problems.    Substance Use: hangovers, driving under the influence and riding with someone under the influence        4/13/2023     6:32 AM   CAGE-AID Total Score   Total Score 2   Total Score MyChart 2 (A total score of 2 or greater is considered clinically significant)       CAGE-AID score  > 1 is a positive screen, suggesting further discussion is needed to determine if evaluation for alcohol or substance abuse is appropriate.  A score > 2 is considered clinically significant, suggesting further evaluation of alcohol or substance-related problems is indicated.      Based on the positive CAGE score and clinical interview there  are not indications of drug or alcohol abuse, but will continue to be monitored for change in therapy.      Significant Losses / Trauma / Abuse / Neglect Issues:   Patient did serve in the  [employer], six years, honorable discharge and no depployment.   There are indications or report of significant loss, trauma, abuse or neglect issues related to: are indications of trauma or loss but client denies these concerns.  Concerns for possible neglect are not present. Pt has experienced a lot of neglect and loss based on his report but he does not identify these experiences as traumatic at this time.    Safety Assessment:   Patient denies current homicidal ideation and behaviors.  Patient denies current self-injurious ideation and behaviors.    Patient denied risk behaviors associated with substance use.  Patient denies any high risk behaviors associated with mental health symptoms.  Patient reports the following current concerns for their personal safety: None.  Patient reports there are not firearms in the house.    History of Safety Concerns:  Patient denied a history of homicidal ideation.     Patient denied a history of personal safety concerns.    Patient denied a history of assaultive behaviors.    Patient denied a history of sexual assault behaviors.     Patient reported a history unsafe motor vehicle operation associated with substance use.  Patient denies any history of high risk behaviors associated with mental health symptoms.  Patient reports the following protective factors: forward or future oriented thinking; dedication to family or friends; safe and stable environment; regular sleep; effectively controls impulses; regular physical activity; sense of belonging; purpose; secure attachment; daily obligations; effective problem solving skills; commitment to well being; financial stability; strong sense of self worth or esteem    Risk Plan:  See Recommendations for Safety and Risk Management Plan    Review of Symptoms per patient report:   Depression: Change in sleep, Lack of interest, Change in energy level, Low self-worth, Ruminations, Feeling sad, down, or depressed, Withdrawn and  occassional crying when it is bad  Brooke:  No Symptoms  Psychosis: No Symptoms  Anxiety: Excessive worry, Physical complaints, such as headaches, stomachaches, muscle tension, Ruminations and existential dread/concerns (what is the point of it all)  Panic:  Palpitations, Tremors, Shortness of breath, Sense of impending doom and happened only once as a kid  Post Traumatic Stress Disorder:  No Symptoms   Eating Disorder: No Symptoms  ADD / ADHD:  No symptoms  Conduct Disorder: No symptoms  Autism Spectrum Disorder: No symptoms  Obsessive Compulsive Disorder: No Symptoms    Patient reports the following compulsive behaviors and treatment history: None endorsed.      Diagnostic Criteria:   311 (F32.9) Unspecified Depressive Disorder, Symptoms characteristic of a depression disorder that caused clinically significant distress or impairment in social, occupational, or other important areas of functioning predominate but do not meet the full criteria for any of the disorders of the major depressive disorders diagnostic class.  Unspecified Anxiety Disorder , Symptoms characteristic of an anxiety disorder that caused clinically significant distress or impairment in social, occupational, or other important areas of functioning predominate but do not meet the full criteria for any of the disorders of the anxiety disorders diagnostic class. Adjustment Disorder  A. The development of emotional or behavioral symptoms in response to an identifiable stressor(s) occurring within 3 months of the onset of the stressor(s)  B. These symptoms or behaviors are clinically significant, as evidenced by one or both of the following:       - Marked distress that is out of proportion to the severity/intensity of the stressor (with consideration for external context & culture)  C. The stress-related disturbance does not meet criteria for another disorder & is not not an exacerbation of another mental disorder  D. The symptoms do not represent  normal bereavement  E. Once the stressor or its consequences have terminated, the symptoms do not persist for more than an additional 6 months       * Adjustment Disorder with Mixed Anxiety and Depressed Mood: The predominant manifestation is a combination of depression and anxiety    Functional Status:  Patient reports the following functional impairments:  health maintenance, management of the household and or completion of tasks, relationship(s), self-care and sense of self-worth.     Nonprogrammatic care:  Patient is requesting basic services to address current mental health concerns.    Clinical Summary:  1. Reason for assessment: establishing DA visit.  2. Psychosocial, Cultural and Contextual Factors: Single, employed, , transplant from [place] ([place]).  3. Principal DSM5 Diagnoses  (Sustained by DSM5 Criteria Listed Above):   Adjustment Disorders  309.28 (F43.23) With mixed anxiety and depressed mood.  4. Other Diagnoses that is relevant to services:   311 (F32.9) Unspecified Depressive Disorder   300.00 (F41.9) Unspecified Anxiety Disorder.  5. Provisional Diagnosis: See above as evidenced by pt presentation and reported symptoms .  6. Prognosis: Expect Improvement and Maintain Current Status / Prevent Deterioration.  7. Likely consequences of symptoms if not treated: Pt endorses concerns that without trx his ability to continue managing his MH effectively could diminish. Furthermore, pt has been feeling progressively more isolated and depressed since moving to [place], which he thinks may start to impact his work performance.  8. Client strengths include:  caring, employed, insightful, intelligent, motivated, open to learning, open to suggestions / feedback, wants to learn, willing to ask questions, willing to relate to others and work history .     Recommendations:     1. Plan for Safety and Risk Management:   Safety and Risk: Recommended that patient call 911 or go to the local ED should  there be a change in any of these risk factors..          Report to child / adult protection services was NA.     2. Patient's identified cultural concerns will be addressed by being taking into account when developing trx pland and engaging in therapy work..     3. Initial Treatment will focus on:    Depressed Mood - low motivation/energy, and symptom awareness.     4. Resources/Service Plan:    services are not indicated.   Modifications to assist communication are not indicated.   Additional disability accommodations are not indicated.      5. Collaboration:   Collaboration / coordination of treatment will be initiated with the following  support professionals: primary care physician.      6.  Referrals:   The following referral(s) will be initiated: none at this time. Next Scheduled Appointment: NA.      A Release of Information has been obtained for the following: NA, pt trx team is [facility].     Emergency Contact, Dad Gomez, was obtained. See EPIC     Clinical Substantiation/medical necessity for the above recommendations:  See above DA.    7. VON:    VON:  Discussed the general effects of drugs and alcohol on health and well-being. Provider gave patient printed information about the effects of chemical use on their health and well being. Recommendations:  None at this time.     8. Records:   These were not available for review at time of assessment.   Information in this assessment was obtained from the medical record and  provided by patient who is a good historian.    Patient will have open access to their mental health medical record.    9.   Interactive Complexity: No      Provider Name/ Credentials:  LADONNA Paez  April 13, 2023

## 2023-04-13 NOTE — PROGRESS NOTES
"[facility]   Mental Health & Addiction Services     Progress Note - Initial Visit    Patient  Name:  Sofi Escobedo Date: 23         Service Type: Individual     Visit Start Time: 1:00pm  Visit End Time: 1:59pm    Visit #: 1    Attendees: Client attended alone    Service Modality:  Video Visit:      Provider verified identity through the following two step process.  Patient provided:  Patient  and Patient address    Telemedicine Visit: The patient's condition can be safely assessed and treated via synchronous audio and visual telemedicine encounter.      Reason for Telemedicine Visit: Services only offered telehealth    Originating Site (Patient Location): Patient's home    Distant Site (Provider Location): Provider Remote Setting- Home Office    Consent:  The patient/guardian has verbally consented to: the potential risks and benefits of telemedicine (video visit) versus in person care; bill my insurance or make self-payment for services provided; and responsibility for payment of non-covered services.     Patient would like the video invitation sent by:  My Chart    Mode of Communication:  Video Conference via Amwell    Distant Location (Provider):  Off-site    As the provider I attest to compliance with applicable laws and regulations related to telemedicine.       DATA:   Interactive Complexity: No   Crisis: No  Extended Session (53+ minutes): PROLONGED SERVICE IN THE OUTPATIENT SETTING REQUIRING DIRECT (FACE-TO-FACE) PATIENT CONTACT BEYOND THE USUAL SERVICE:    - Patient's presenting concerns require more intensive intervention than could be completed within the usual service    - Treatment protocol required additional time to complete, due to the nature of diagnosis being treated.  See Interventions section for details     Presenting Concerns/  Current Stressors:   The reason for seeking services at this time is: \"Learning to not let things from my past get in my way " "and learning to live a better life.\". Pt recently realized that he tends to dwell on a specific past relationship and while he wants to have his own family, he finds that he has trust issues that get in the way. Pt moved to [place] from [place] about a year ago and has found himself falling back into his old patterns of going to work and not engaging in the other activities he wants to, even on days he has off. Pt moved to [place] to start over, finding that being in [place] reinforced unhelpful patterns and that because of his history there found himself falling back into spaces he didn't really waqnt to engage with. Pt reflected that a lot of his trust issues stem from family dynamics growing up where he felt abandoned or given up on when he tried to be independent. Pt described how this has made him feel like he needs to be \"wanted/appreciated\" and get external validation that he can't get from himself. Pt also recently realized he has been feeling progressively more lonely since the move. Pt also verbalized thinking that he is \"broken\" now because of his past and doesn't feel that he can connect with people now on a deeper level. Pt has also found the culture shift to been challenging too. Session took longer then intended due to collecting data for DA and pt presenting concerns.       ASSESSMENT:  Mental Status Assessment:  Appearance:   Appropriate   Eye Contact:   Good   Psychomotor Behavior: Normal   Attitude:   Cooperative  Interested Friendly Pleasant  Orientation:   All  Speech   Rate / Production: Normal/ Responsive   Volume:  Normal   Mood:    Depressed  Normal Sad   Affect:    Appropriate   Thought Content:  Clear   Thought Form:  Coherent  Logical   Insight:    Fair  and Intellectual Insight      Safety Issues and Plan for Safety and Risk Management:     [place] Suicide Severity Rating Scale was not completed this session due to lack of time and will be completed next session.    Patient denies current " fears or concerns for personal safety.  Patient denies current or recent suicidal ideation or behaviors.  Patient denies current or recent homicidal ideation or behaviors.  Patient denies current or recent self injurious behavior or ideation.  Patient denies other safety concerns.  Recommended that patient call 911 or go to the local ED should there be a change in any of these risk factors.  Patient reports there are no firearms in the house.     Diagnostic Criteria:  Adjustment Disorder  A. The development of emotional or behavioral symptoms in response to an identifiable stressor(s) occurring within 3 months of the onset of the stressor(s)  B. These symptoms or behaviors are clinically significant, as evidenced by one or both of the following:       - Marked distress that is out of proportion to the severity/intensity of the stressor (with consideration for external context & culture)  C. The stress-related disturbance does not meet criteria for another disorder & is not not an exacerbation of another mental disorder  D. The symptoms do not represent normal bereavement  E. Once the stressor or its consequences have terminated, the symptoms do not persist for more than an additional 6 months       * Adjustment Disorder with Depressed Mood: The predominant manifestations are symptoms such as low mood, tearfulness, or feelings of hopelessness      DSM5 Diagnoses: (Sustained by DSM5 Criteria Listed Above)  Diagnoses: Adjustment Disorders  309.0 (F43.21) With depressed mood  Psychosocial & Contextual Factors: Pt recently moved to [place] and is dealing with a lot of transitions at home and work.   PROMIS-10 Scores        4/13/2023     6:31 AM   PROMIS-10 Total Score w/o Sub Scores   PROMIS TOTAL - SUBSCORES 25       Intervention:   Psychodynamic- Patient processed internal experiences , Completed through review of safety issues and safety interventions and Educated on treatment planning and started identifying goals and  interventions for treatment plan  Collateral Reports Completed:  Not Applicable      PLAN: (Homework, other):  1. Provider will continue Diagnostic Assessment.  Patient was given the following to do until next session:  Attend next session    2. Provider recommended the following referrals: NA.      3.  Suicide Risk and Safety Concerns were assessed for Sofi Escobedo.    Patient meets the following risk assessment and triage: [place] Suicide Severity Rating Scale not completed do the following reason lack of time and pt did not endorse any SI during session.      Judd Clemons, [facility]  April 13, 2023

## 2023-04-18 ENCOUNTER — VIRTUAL VISIT (OUTPATIENT)
Dept: BEHAVIORAL HEALTH | Facility: HOSPITAL | Age: 28
End: 2023-04-18
Payer: COMMERCIAL

## 2023-04-18 DIAGNOSIS — F43.21 ADJUSTMENT DISORDER WITH DEPRESSED MOOD: Primary | ICD-10-CM

## 2023-04-18 PROCEDURE — 90791 PSYCH DIAGNOSTIC EVALUATION: CPT | Mod: VID | Performed by: COUNSELOR

## 2023-04-18 ASSESSMENT — COLUMBIA-SUICIDE SEVERITY RATING SCALE - C-SSRS
1. IN THE PAST MONTH, HAVE YOU WISHED YOU WERE DEAD OR WISHED YOU COULD GO TO SLEEP AND NOT WAKE UP?: NO
REASONS FOR IDEATION PAST MONTH: DOES NOT APPLY
TOTAL  NUMBER OF ABORTED OR SELF INTERRUPTED ATTEMPTS LIFETIME: NO
ATTEMPT LIFETIME: NO
TOTAL  NUMBER OF INTERRUPTED ATTEMPTS LIFETIME: NO
1. HAVE YOU WISHED YOU WERE DEAD OR WISHED YOU COULD GO TO SLEEP AND NOT WAKE UP?: YES
2. HAVE YOU ACTUALLY HAD ANY THOUGHTS OF KILLING YOURSELF?: NO
6. HAVE YOU EVER DONE ANYTHING, STARTED TO DO ANYTHING, OR PREPARED TO DO ANYTHING TO END YOUR LIFE?: NO
REASONS FOR IDEATION LIFETIME: DOES NOT APPLY

## 2023-04-26 ENCOUNTER — VIRTUAL VISIT (OUTPATIENT)
Dept: BEHAVIORAL HEALTH | Facility: HOSPITAL | Age: 28
End: 2023-04-26
Payer: COMMERCIAL

## 2023-04-26 DIAGNOSIS — F43.21 ADJUSTMENT DISORDER WITH DEPRESSED MOOD: Primary | ICD-10-CM

## 2023-04-26 PROCEDURE — 90834 PSYTX W PT 45 MINUTES: CPT | Mod: VID | Performed by: COUNSELOR

## 2023-04-26 NOTE — PROGRESS NOTES
[facility]                                     Progress Note    Patient Name: Sofi Escobedo  Date: 4/26/23         Service Type: Individual      Session Start Time: 2:03  Session End Time: 2:43     Session Length: 40 minutes    Session #: 2    Attendees: Client attended alone    Service Modality:  Video Visit:      Provider verified identity through the following two step process.  Patient provided:  Patient is known previously to provider    Telemedicine Visit: The patient's condition can be safely assessed and treated via synchronous audio and visual telemedicine encounter.      Reason for Telemedicine Visit: Services only offered telehealth    Originating Site (Patient Location): Patient's home    Distant Site (Provider Location): Provider Remote Setting- Home Office    Consent:  The patient/guardian has verbally consented to: the potential risks and benefits of telemedicine (video visit) versus in person care; bill my insurance or make self-payment for services provided; and responsibility for payment of non-covered services.     Patient would like the video invitation sent by:  My Chart    Mode of Communication:  Video Conference via Amwell    Distant Location (Provider):  Off-site    As the provider I attest to compliance with applicable laws and regulations related to telemedicine.    DATA  Interactive Complexity: No  Crisis: No        Progress Since Last Session (Related to Symptoms / Goals / Homework):   Symptoms: Improving symptoms overall    Homework: none assigned, establishing trx plan visit      Episode of Care Goals: Achieved / completed to satisfaction - PREPARATION (Decided to change - considering how); Intervened by negotiating a change plan and determining options / strategies for behavior change, identifying triggers, exploring social supports, and working towards setting a date to begin behavior change     Current / Ongoing Stressors and Concerns:   Pt is  currently seeking therapy due to ongoing anxiety/depression and to better manage his temper. Pt reported that things have been going better with him since last session and he has been doing a better job at managing his emotions. Pt verbalized that he went on a date last week, which went really well and he felt they connected more then he has with others. Pt processed through how things have been going at work. Pt reflected that he has been working on self-advocating without getting angry. Pt has found people at his work that can be allies in trying to resolve interpersonal conflict and has felt somewhat validated in that some of his co-workers have noticed things that he has. Therapist (writer) and pt co-developed his trx plan. Pt discussed what he has been helpful so far and what he can do going forward to continue working towards his goals. This included finding spaces in which he could meet people to start building a social support network here in [place].       Treatment Objective(s) Addressed in This Session:   identify and compliment positives at least 3 times daily to support positive self-image  identify triggers, thoughts, and current stressors which contribute to feelings of anxiety  identify patterns of escalation  (i.e. tightness in chest, flushed face, increased heart rate, clenched hands, etc.)  identify at least 3 techniques for intervening on the escalation  use cognitive strategies identified in therapy to challenge anxious thoughts  Decrease frequency and intensity of feeling down, depressed, hopeless  Identify negative self-talk and behaviors: challenge core beliefs, myths, and actions  identify two areas of life that you would like to have improved functioning  identify at least 3 self-care activities  Co-developed trx plan     Intervention:   CBT: Reviewed recently behavior patterns  Motivational Interviewing    MI Intervention: Co-Developed Goal: trx plan, Expressed Empathy/Understanding, Supported  Autonomy, Collaboration, Evocation, Open-ended questions, Reflections: simple and complex and Change talk (evoked)     Change Talk Expressed by the Patient: Desire to change Ability to change Reasons to change Need to change Committment to change Activation Taking steps    Provider Response to Change Talk: E - Evoked more info from patient about behavior change, A - Affirmed patient's thoughts, decisions, or attempts at behavior change, R - Reflected patient's change talk and S - Summarized patient's change talk statements    Psychodynamic: Processed through internal-experiences related to anger and frustration management  Solution Focused: Identified immediate areas of concern and potential barriers to success    Assessments completed prior to visit:  PHQ2:       3/7/2023    11:25 AM 3/7/2023    11:24 AM   PHQ-2 ( 1999 [employer])   Q1: Little interest or pleasure in doing things 1 1   Q2: Feeling down, depressed or hopeless 0 0   PHQ-2 Score 1 1   Q1: Little interest or pleasure in doing things Several days Several days   Q2: Feeling down, depressed or hopeless Not at all Not at all   PHQ-2 Score 1 1     PHQ9:       4/12/2023     4:37 PM   PHQ-9 SCORE   PHQ-9 Total Score MyChart 5 (Mild depression)   PHQ-9 Total Score 5     GAD2:       4/12/2023     4:53 PM   BISHOP-2   Feeling nervous, anxious, or on edge 1   Not being able to stop or control worrying 1   BISHOP-2 Total Score 2        ASSESSMENT: Current Emotional / Mental Status (status of significant symptoms):   Risk status (Self / Other harm or suicidal ideation)   Patient denies current fears or concerns for personal safety.   Patient denies current or recent suicidal ideation or behaviors.   Patient denies current or recent homicidal ideation or behaviors.   Patient denies current or recent self injurious behavior or ideation.   Patient denies other safety concerns.   Patient reports there has been no change in risk factors since their last session.     Patient  reports there has been no change in protective factors since their last session.     Recommended that patient call 911 or go to the local ED should there be a change in any of these risk factors.     Appearance:   Appropriate    Eye Contact:   Good    Psychomotor Behavior: Normal    Attitude:   Cooperative  Interested Friendly Pleasant   Orientation:   All   Speech    Rate / Production: Normal/ Responsive    Volume:  Normal    Mood:    Anxious  Normal   Affect:    Appropriate  frustrated    Thought Content:  Clear    Thought Form:  Coherent  Logical    Insight:    Fair  and Intellectual Insight     Medication Review:   No current psychiatric medications prescribed     Medication Compliance:   NA     Changes in Health Issues:   None reported     Chemical Use Review:   Substance Use: Chemical use reviewed, no active concerns identified      Tobacco Use: No change in amount of tobacco use since last session.  Provided encouragement to quit   Patient declined discussion at this time    Diagnosis:  1. Adjustment disorder with depressed mood        Collateral Reports Completed:   Not Applicable    PLAN: (Patient Tasks / Therapist Tasks / Other)  Attend gym 4-5 times per week, and use sauna after each time   Go for walk on days you don't go to gym   Research local shooting clubs/[employer]   Get license switched to [place] (extra credit)   [employer] 50/30/20 budgeting rule (research)     Judd Clemons [facility]                                                         ______________________________________________________________________    Individual Treatment Plan    Patient's Name: Sofi Escobedo  YOB: 1995    Date of Creation: 4/26/23  Date Treatment Plan Last Reviewed/Revised: 4/26/23    DSM5 Diagnoses: Adjustment Disorders  309.0 (F43.21) With depressed mood  Psychosocial / Contextual Factors: Pt recently moved to [place] from [place] and has been dealing with the culture shift. Pt has also been working on trying to  get himself established here which has been challenging.  PROMIS (reviewed every 90 days):   PROMIS-10 Scores        4/13/2023     6:31 AM   PROMIS-10 Total Score w/o Sub Scores   PROMIS TOTAL - SUBSCORES 25       Referral / Collaboration:  Referral to another professional/service is not indicated at this time..    Anticipated number of session for this episode of care: 9-12 sessions  Anticipation frequency of session: Weekly  Anticipated Duration of each session: 38-52 minutes  Treatment plan will be reviewed in 90 days or when goals have been changed.       MeasurableTreatment Goal(s) related to diagnosis / functional impairment(s)  Goal 1: Patient will better manage his anger/frustration, not getting as easily aggravated, and not letting comments get to him as easily.    I will know I've met my goal when I am able to not let others get to me so much.      Objective #A (Patient Action)    Patient will identify triggers, thoughts, and current stressors which contribute to feelings of anxiety  identify patterns of escalation  (i.e. tightness in chest, flushed face, increased heart rate, clenched hands, etc.)  identify at least 3 techniques for intervening on the escalation  use cognitive strategies identified in therapy to challenge anxious thoughts  Increase interest, engagement, and pleasure in doing things  Decrease frequency and intensity of feeling down, depressed, hopeless  Identify negative self-talk and behaviors: challenge core beliefs, myths, and actions  Improve concentration, focus, and mindfulness in daily activities   identify 3 coping strategies for improving mood  learn at least 3 self-regulation strategies.  Status: New - Date: 4/26/23     Intervention(s)  Therapist will assign homework related to target objective with the intent to promote pt autonomy and better manage MH.  Facilitate increase in self-awareness  Provide psycho-education on emotion identification/regulation and coping  skills  Teach/promote utilization of mindfulness skills and grounding    Goal 2: Patient will feel worthy and be able to find validation internally.     I will know I've met my goal when able to feel more complete or worthy without needing it externally.      Objective #A (Patient Action)    Status: New - Date: 4/26/23     Patient will identify and compliment positives at least 3 times daily to support positive self-image  Decrease frequency and intensity of feeling down, depressed, hopeless  Identify negative self-talk and behaviors: challenge core beliefs, myths, and actions  identify 5 traits or charcteristics you like about yourself  identify at least 3 self-care activities.    Intervention(s)  Therapist will assign homework related to target objective with the intent to promote pt autonomy and better manage MH.  Facilitate increase in self-awareness  Provide psycho-education on self-care/compassion and narrative therapy interventions  Teach/promote utilization of reframing and boundary setting    Objective #B Being more authentic with myself and others through improving my self-esteem.   Patient will participate in social activities to improve mood  learn & utilize at least 3 assertive communication skills weekly  identify 5 traits or charcteristics you like about yourself  identify at least 3 adaptive coping statements to counteract negative thoughts.    Status: New - Date: 4/26/23     Intervention(s)  Therapist will assign homework related to target objective with the intent to promote pt autonomy and better manage MH.  Facilitate increase in self-awareness  Provide psycho-education on self-esteem building and self-exploration  Teach/promote utilization of positive self-affirmations and critical observation skills    Patient has reviewed and agreed to the above plan.      LADONNA Paez  April 26, 2023

## 2023-05-10 ENCOUNTER — VIRTUAL VISIT (OUTPATIENT)
Dept: BEHAVIORAL HEALTH | Facility: CLINIC | Age: 28
End: 2023-05-10
Payer: COMMERCIAL

## 2023-05-10 DIAGNOSIS — F43.21 ADJUSTMENT DISORDER WITH DEPRESSED MOOD: Primary | ICD-10-CM

## 2023-05-10 PROCEDURE — 90837 PSYTX W PT 60 MINUTES: CPT | Mod: VID | Performed by: COUNSELOR

## 2023-05-10 NOTE — PROGRESS NOTES
[facility]                                     Progress Note    Patient Name: Sofi Escobedo  Date: 5/10/23         Service Type: Individual      Session Start Time: 2:02  Session End Time: 3:00     Session Length: 58 minutes    Session #: 3    Attendees: Client attended alone    Service Modality:  Video Visit:      Provider verified identity through the following two step process.  Patient provided:  Patient is known previously to provider    Telemedicine Visit: The patient's condition can be safely assessed and treated via synchronous audio and visual telemedicine encounter.      Reason for Telemedicine Visit: Services only offered telehealth    Originating Site (Patient Location): Patient's home    Distant Site (Provider Location): Provider Remote Setting- Home Office    Consent:  The patient/guardian has verbally consented to: the potential risks and benefits of telemedicine (video visit) versus in person care; bill my insurance or make self-payment for services provided; and responsibility for payment of non-covered services.     Patient would like the video invitation sent by:  My Chart    Mode of Communication:  Video Conference via [employer]    Distant Location (Provider):  Off-site    As the provider I attest to compliance with applicable laws and regulations related to telemedicine.    DATA  Interactive Complexity: No  Crisis: No  Extended Session (53+ minutes): PROLONGED SERVICE IN THE OUTPATIENT SETTING REQUIRING DIRECT (FACE-TO-FACE) PATIENT CONTACT BEYOND THE USUAL SERVICE:    - Patient's presenting concerns require more intensive intervention than could be completed within the usual service     Progress Since Last Session (Related to Symptoms / Goals / Homework):   Symptoms: No change in symptoms, maintained gains    Homework: Partially completed      Episode of Care Goals: Satisfactory progress - ACTION (Actively working towards change); Intervened by reinforcing change  plan / affirming steps taken     Current / Ongoing Stressors and Concerns:   Pt is currently seeking therapy due to ongoing anxiety/depression and to better manage his temper. Pt reported that things have been challenging recently with his dad having been through a major work accident and dealing with some romantic relationship challenges. Pt reflected that he is worried about his dad and is going to be seeing him at the beginning of next month for his birthday for a week. Pt processed through how things have been going in his romantic relationship situation. Pt reflected how he has never really dated and that trying to navigate his feelings has been difficult due to worries about internalizing rejection. Pt acknowledged that he is not responsible for the decisions of others and that sacrificing his needs is not a good foundation for a supportive relationship. Pt also agreed that he needs to work on being more honest with his partners and up front with what he is looking for, instead of trying to adjust/change to meet the expectations of others. Furthermore, after discussing it pt identified that he needs to work on setting boundaries when it comes to determining if he is just being friends with a woman or being in a relationship. Pt briefly talked about how his sister who lived in [place] has moved back to [place], which he isn't feeling great about. Pt also reviewed how things were going at work and confirmed that he will be attending moderation with his one supervisor as the situation is not improved, despite him trying to not engage with them when possible. Session took longer then anticipated due to presenting issues.     Treatment Objective(s) Addressed in This Session:   identify and compliment positives at least 3 times daily to support positive self-image  identify triggers, thoughts, and current stressors which contribute to feelings of anxiety  identify patterns of escalation  (i.e. tightness in chest,  flushed face, increased heart rate, clenched hands, etc.)  identify at least 3 techniques for intervening on the escalation  use cognitive strategies identified in therapy to challenge anxious thoughts  Decrease frequency and intensity of feeling down, depressed, hopeless  Identify negative self-talk and behaviors: challenge core beliefs, myths, and actions  identify two areas of life that you would like to have improved functioning  identify at least 3 self-care activities     Intervention:   CBT: Reviewed recently behavior patterns and reinforcing cycle  Motivational Interviewing    MI Intervention: Expressed Empathy/Understanding, Supported Autonomy, Collaboration, Evocation, Open-ended questions, Reflections: simple and complex, Change talk (evoked) and Reframe     Change Talk Expressed by the Patient: Desire to change Ability to change Reasons to change Need to change Committment to change Activation Taking steps    Provider Response to Change Talk: E - Evoked more info from patient about behavior change, A - Affirmed patient's thoughts, decisions, or attempts at behavior change, R - Reflected patient's change talk and S - Summarized patient's change talk statements    Psychodynamic: Processed through internal-experiences related to anger and frustration management  Solution Focused: Identified immediate areas of concern and potential barriers to success in relationships    Assessments completed prior to visit:  PHQ2:       3/7/2023    11:25 AM 3/7/2023    11:24 AM   PHQ-2 ( 1999 [employer])   Q1: Little interest or pleasure in doing things 1 1   Q2: Feeling down, depressed or hopeless 0 0   PHQ-2 Score 1 1   Q1: Little interest or pleasure in doing things Several days Several days   Q2: Feeling down, depressed or hopeless Not at all Not at all   PHQ-2 Score 1 1     PHQ9:       4/12/2023     4:37 PM   PHQ-9 SCORE   PHQ-9 Total Score MyChart 5 (Mild depression)   PHQ-9 Total Score 5     GAD2:       4/12/2023      4:53 PM   BISHOP-2   Feeling nervous, anxious, or on edge 1   Not being able to stop or control worrying 1   BISHOP-2 Total Score 2        ASSESSMENT: Current Emotional / Mental Status (status of significant symptoms):   Risk status (Self / Other harm or suicidal ideation)   Patient denies current fears or concerns for personal safety.   Patient denies current or recent suicidal ideation or behaviors.   Patient denies current or recent homicidal ideation or behaviors.   Patient denies current or recent self injurious behavior or ideation.   Patient denies other safety concerns.   Patient reports there has been no change in risk factors since their last session.     Patient reports there has been no change in protective factors since their last session.     Recommended that patient call 911 or go to the local ED should there be a change in any of these risk factors.     Appearance:   Appropriate    Eye Contact:   Good    Psychomotor Behavior: Normal    Attitude:   Cooperative  Interested Friendly Pleasant   Orientation:   All   Speech    Rate / Production: Normal/ Responsive Talkative    Volume:  Normal    Mood:    Anxious  Normal   Affect:    Appropriate  frustrated    Thought Content:  Clear    Thought Form:  Coherent  Logical    Insight:    Good  and Intellectual Insight     Medication Review:   No current psychiatric medications prescribed     Medication Compliance:   NA     Changes in Health Issues:   None reported     Chemical Use Review:   Substance Use: Chemical use reviewed, no active concerns identified      Tobacco Use: No change in amount of tobacco use since last session.  Provided encouragement to quit   Patient declined discussion at this time    Diagnosis:  1. Adjustment disorder with depressed mood        Collateral Reports Completed:   Not Applicable    PLAN: (Patient Tasks / Therapist Tasks / Other)  Attend gym 4-5 times per week, and use sauna after each time   Go for walk on days you don't go to gym    Attend mediation with Supervisor  Practice being honest but nice about it (try saying no to [place] women)  Look into new apartment options  Research local shooting clubs/ranges (extra credit)   Get license switched to [place] (extra credit)       Judd Clemons [facility]                                                         ______________________________________________________________________    Individual Treatment Plan    Patient's Name: Sofi Escobedo  YOB: 1995    Date of Creation: 4/26/23  Date Treatment Plan Last Reviewed/Revised: 4/26/23    DSM5 Diagnoses: Adjustment Disorders  309.0 (F43.21) With depressed mood  Psychosocial / Contextual Factors: Pt recently moved to [place] from [place] and has been dealing with the culture shift. Pt has also been working on trying to get himself established here which has been challenging.  PROMIS (reviewed every 90 days):   PROMIS-10 Scores        4/13/2023     6:31 AM   PROMIS-10 Total Score w/o Sub Scores   PROMIS TOTAL - SUBSCORES 25       Referral / Collaboration:  Referral to another professional/service is not indicated at this time..    Anticipated number of session for this episode of care: 9-12 sessions  Anticipation frequency of session: Weekly  Anticipated Duration of each session: 38-52 minutes  Treatment plan will be reviewed in 90 days or when goals have been changed.       MeasurableTreatment Goal(s) related to diagnosis / functional impairment(s)  Goal 1: Patient will better manage his anger/frustration, not getting as easily aggravated, and not letting comments get to him as easily.    I will know I've met my goal when I am able to not let others get to me so much.      Objective #A (Patient Action)    Patient will identify triggers, thoughts, and current stressors which contribute to feelings of anxiety  identify patterns of escalation  (i.e. tightness in chest, flushed face, increased heart rate, clenched hands, etc.)  identify at least 3  techniques for intervening on the escalation  use cognitive strategies identified in therapy to challenge anxious thoughts  Increase interest, engagement, and pleasure in doing things  Decrease frequency and intensity of feeling down, depressed, hopeless  Identify negative self-talk and behaviors: challenge core beliefs, myths, and actions  Improve concentration, focus, and mindfulness in daily activities   identify 3 coping strategies for improving mood  learn at least 3 self-regulation strategies.  Status: New - Date: 4/26/23     Intervention(s)  Therapist will assign homework related to target objective with the intent to promote pt autonomy and better manage MH.  Facilitate increase in self-awareness  Provide psycho-education on emotion identification/regulation and coping skills  Teach/promote utilization of mindfulness skills and grounding    Goal 2: Patient will feel worthy and be able to find validation internally.     I will know I've met my goal when able to feel more complete or worthy without needing it externally.      Objective #A (Patient Action)    Status: New - Date: 4/26/23     Patient will identify and compliment positives at least 3 times daily to support positive self-image  Decrease frequency and intensity of feeling down, depressed, hopeless  Identify negative self-talk and behaviors: challenge core beliefs, myths, and actions  identify 5 traits or charcteristics you like about yourself  identify at least 3 self-care activities.    Intervention(s)  Therapist will assign homework related to target objective with the intent to promote pt autonomy and better manage MH.  Facilitate increase in self-awareness  Provide psycho-education on self-care/compassion and narrative therapy interventions  Teach/promote utilization of reframing and boundary setting    Objective #B Being more authentic with myself and others through improving my self-esteem.   Patient will participate in social activities to  improve mood  learn & utilize at least 3 assertive communication skills weekly  identify 5 traits or charcteristics you like about yourself  identify at least 3 adaptive coping statements to counteract negative thoughts.    Status: New - Date: 4/26/23     Intervention(s)  Therapist will assign homework related to target objective with the intent to promote pt autonomy and better manage MH.  Facilitate increase in self-awareness  Provide psycho-education on self-esteem building and self-exploration  Teach/promote utilization of positive self-affirmations and critical observation skills    Patient has reviewed and agreed to the above plan.      Judd Clemons, LPCC  April 26, 2023

## 2023-06-01 ENCOUNTER — VIRTUAL VISIT (OUTPATIENT)
Dept: BEHAVIORAL HEALTH | Facility: CLINIC | Age: 28
End: 2023-06-01
Payer: COMMERCIAL

## 2023-06-01 DIAGNOSIS — F43.21 ADJUSTMENT DISORDER WITH DEPRESSED MOOD: Primary | ICD-10-CM

## 2023-06-01 DIAGNOSIS — F43.9 STRESS: ICD-10-CM

## 2023-06-01 PROCEDURE — 90837 PSYTX W PT 60 MINUTES: CPT | Mod: VID | Performed by: COUNSELOR

## 2023-06-01 NOTE — PROGRESS NOTES
[facility]                                     Progress Note    Patient Name: Sofi Escobedo  Date: 6/01/23         Service Type: Individual      Session Start Time: 11:00am  Session End Time: 11:55     Session Length: 55 minutes    Session #: 4    Attendees: Client attended alone    Service Modality:  Video Visit:      Provider verified identity through the following two step process.  Patient provided:  Patient is known previously to provider    Telemedicine Visit: The patient's condition can be safely assessed and treated via synchronous audio and visual telemedicine encounter.      Reason for Telemedicine Visit: Services only offered telehealth    Originating Site (Patient Location): Patient's home    Distant Site (Provider Location): Provider Remote Setting- Home Office    Consent:  The patient/guardian has verbally consented to: the potential risks and benefits of telemedicine (video visit) versus in person care; bill my insurance or make self-payment for services provided; and responsibility for payment of non-covered services.     Patient would like the video invitation sent by:  My Chart    Mode of Communication:  Video Conference via [employer]    Distant Location (Provider):  Off-site    As the provider I attest to compliance with applicable laws and regulations related to telemedicine.    DATA  Interactive Complexity: No  Crisis: No  Extended Session (53+ minutes): PROLONGED SERVICE IN THE OUTPATIENT SETTING REQUIRING DIRECT (FACE-TO-FACE) PATIENT CONTACT BEYOND THE USUAL SERVICE:    - Patient's presenting concerns require more intensive intervention than could be completed within the usual service     Progress Since Last Session (Related to Symptoms / Goals / Homework):   Symptoms: Worsening symptoms, pt has been feeling overwhelmed and highly stressed/frustrated    Homework: Partially completed      Episode of Care Goals: Satisfactory progress - ACTION (Actively working  towards change); Intervened by reinforcing change plan / affirming steps taken     Current / Ongoing Stressors and Concerns:   Pt is currently seeking therapy due to ongoing anxiety/depression and to better manage his temper. Since last session, pt reported that he has experienced a lot of relationship shifts and changes. Pt returned to a romantic relationship with an ex and has come to realized that she hasn't learned anything or changed as a person since they broke up last time. Pt processed through how this has been going and expressed questioning if he made the right decision. Pt reflected that he has been frustrated with his girlfriend as he feel she is taking advantage of him, using him for his money to support herself. Pt clarified that she has also basically moved in with him, and is borrowing his car, on top of asking for money regularly. Pt admitted that she recently started working again, and yet he feels she continues to ask for him to pay for things still. After discussing it at length, pt identified how he doesn't feel their relationship is reciprocal and that she is fundamentally to immature for him to stay with long term. Furthermore, pt verbalized that he has been dipping into his [employer] fund to see his family to support her, which is something he really doesn't like. Pt agreed that he needs to practice saying no to her and others more frequently, especially when it comes to them asking him for money. Pt also discussed how work has been really stressful, citing his disastrous mediation meeting with his co-worker. Pt expressed feeling ganged up on and that his co-worker was allowed to just sit and not engage in the process themselves. Pt's attending manager ended the mediation early, due to lack of progress, and afterward validated how pt felt, which helped him feel like he has some support at work. Pt discussed potential ways to address the situation constructively and yet verbalized feeling really  stressed out. Pt clarified that his work is already very challenging and the situation with his co-worker is making it almost overwhelming at times. Pt was able to identify other people at work he can reach out to for support, though is very grateful that he is taking a vacation that he needs to de-stress. Session took longer then anticipated due to presenting issues.     Treatment Objective(s) Addressed in This Session:   identify and compliment positives at least 3 times daily to support positive self-image  identify triggers, thoughts, and current stressors which contribute to feelings of anxiety  identify patterns of escalation  (i.e. tightness in chest, flushed face, increased heart rate, clenched hands, etc.)  identify at least 3 techniques for intervening on the escalation  use cognitive strategies identified in therapy to challenge anxious thoughts  Decrease frequency and intensity of feeling down, depressed, hopeless  Identify negative self-talk and behaviors: challenge core beliefs, myths, and actions  identify two areas of life that you would like to have improved functioning  identify at least 3 self-care activities     Intervention:   CBT: Reviewed recently behavior patterns and reinforcing cycle  Motivational Interviewing    MI Intervention: Expressed Empathy/Understanding, Supported Autonomy, Collaboration, Evocation, Open-ended questions, Reflections: simple and complex, Change talk (evoked) and Reframe     Change Talk Expressed by the Patient: Desire to change Ability to change Reasons to change Need to change Committment to change Activation Taking steps    Provider Response to Change Talk: E - Evoked more info from patient about behavior change, A - Affirmed patient's thoughts, decisions, or attempts at behavior change, R - Reflected patient's change talk and S - Summarized patient's change talk statements    Psychodynamic: Processed through internal-experiences related to anger and frustration  management  Solution Focused: Identified immediate areas of concern and potential barriers to success in relationships    Assessments completed prior to visit:  PHQ2:       3/7/2023    11:25 AM 3/7/2023    11:24 AM   PHQ-2 ( [employer])   Q1: Little interest or pleasure in doing things 1 1   Q2: Feeling down, depressed or hopeless 0 0   PHQ-2 Score 1 1   Q1: Little interest or pleasure in doing things Several days Several days   Q2: Feeling down, depressed or hopeless Not at all Not at all   PHQ-2 Score 1 1     PHQ9:       4/12/2023     4:37 PM   PHQ-9 SCORE   PHQ-9 Total Score MyChart 5 (Mild depression)   PHQ-9 Total Score 5     GAD2:       4/12/2023     4:53 PM   BISHOP-2   Feeling nervous, anxious, or on edge 1   Not being able to stop or control worrying 1   BISHOP-2 Total Score 2        ASSESSMENT: Current Emotional / Mental Status (status of significant symptoms):   Risk status (Self / Other harm or suicidal ideation)   Patient denies current fears or concerns for personal safety.   Patient denies current or recent suicidal ideation or behaviors.   Patient denies current or recent homicidal ideation or behaviors.   Patient denies current or recent self injurious behavior or ideation.   Patient denies other safety concerns.   Patient reports there has been no change in risk factors since their last session.     Patient reports there has been no change in protective factors since their last session.     Recommended that patient call 911 or go to the local ED should there be a change in any of these risk factors.     Appearance:   Appropriate    Eye Contact:   Good    Psychomotor Behavior: Normal    Attitude:   Cooperative  Interested Friendly Pleasant   Orientation:   All   Speech    Rate / Production: Normal/ Responsive Talkative    Volume:  Normal    Mood:    Anxious  Normal frustrated   Affect:    Appropriate  frustrated    Thought Content:  Clear    Thought Form:  Coherent  Logical    Insight:    Good  and  "Intellectual Insight     Medication Review:   No current psychiatric medications prescribed     Medication Compliance:   NA     Changes in Health Issues:   None reported     Chemical Use Review:   Substance Use: Chemical use reviewed, no active concerns identified      Tobacco Use: No change in amount of tobacco use since last session.  Provided encouragement to quit   Patient declined discussion at this time    Diagnosis:  1. Adjustment disorder with depressed mood    2. Stress        Collateral Reports Completed:   Not Applicable    PLAN: (Patient Tasks / Therapist Tasks / Other)  Continue to attend gym 4-5 times per week, and use sauna after each time   Practice being honest but nice about it   Work on saying \"no\"  Talk with girlfriend about your relationship status  Prioritize self-care  Get license switched to [place] (extra credit)     Judd Clemons [facility]                                                         ______________________________________________________________________    Individual Treatment Plan    Patient's Name: Sofi Escobedo  YOB: 1995    Date of Creation: 4/26/23  Date Treatment Plan Last Reviewed/Revised: 4/26/23    DSM5 Diagnoses: Adjustment Disorders  309.0 (F43.21) With depressed mood  Psychosocial / Contextual Factors: Pt recently moved to [place] from [place] and has been dealing with the culture shift. Pt has also been working on trying to get himself established here which has been challenging.  PROMIS (reviewed every 90 days):   PROMIS-10 Scores        4/13/2023     6:31 AM   PROMIS-10 Total Score w/o Sub Scores   PROMIS TOTAL - SUBSCORES 25       Referral / Collaboration:  Referral to another professional/service is not indicated at this time..    Anticipated number of session for this episode of care: 9-12 sessions  Anticipation frequency of session: Weekly  Anticipated Duration of each session: 38-52 minutes  Treatment plan will be reviewed in 90 days or when goals have " been changed.       MeasurableTreatment Goal(s) related to diagnosis / functional impairment(s)  Goal 1: Patient will better manage his anger/frustration, not getting as easily aggravated, and not letting comments get to him as easily.    I will know I've met my goal when I am able to not let others get to me so much.      Objective #A (Patient Action)    Patient will identify triggers, thoughts, and current stressors which contribute to feelings of anxiety  identify patterns of escalation  (i.e. tightness in chest, flushed face, increased heart rate, clenched hands, etc.)  identify at least 3 techniques for intervening on the escalation  use cognitive strategies identified in therapy to challenge anxious thoughts  Increase interest, engagement, and pleasure in doing things  Decrease frequency and intensity of feeling down, depressed, hopeless  Identify negative self-talk and behaviors: challenge core beliefs, myths, and actions  Improve concentration, focus, and mindfulness in daily activities   identify 3 coping strategies for improving mood  learn at least 3 self-regulation strategies.  Status: New - Date: 4/26/23     Intervention(s)  Therapist will assign homework related to target objective with the intent to promote pt autonomy and better manage MH.  Facilitate increase in self-awareness  Provide psycho-education on emotion identification/regulation and coping skills  Teach/promote utilization of mindfulness skills and grounding    Goal 2: Patient will feel worthy and be able to find validation internally.     I will know I've met my goal when able to feel more complete or worthy without needing it externally.      Objective #A (Patient Action)    Status: New - Date: 4/26/23     Patient will identify and compliment positives at least 3 times daily to support positive self-image  Decrease frequency and intensity of feeling down, depressed, hopeless  Identify negative self-talk and behaviors: challenge core  beliefs, myths, and actions  identify 5 traits or charcteristics you like about yourself  identify at least 3 self-care activities.    Intervention(s)  Therapist will assign homework related to target objective with the intent to promote pt autonomy and better manage MH.  Facilitate increase in self-awareness  Provide psycho-education on self-care/compassion and narrative therapy interventions  Teach/promote utilization of reframing and boundary setting    Objective #B Being more authentic with myself and others through improving my self-esteem.   Patient will participate in social activities to improve mood  learn & utilize at least 3 assertive communication skills weekly  identify 5 traits or charcteristics you like about yourself  identify at least 3 adaptive coping statements to counteract negative thoughts.    Status: New - Date: 4/26/23     Intervention(s)  Therapist will assign homework related to target objective with the intent to promote pt autonomy and better manage MH.  Facilitate increase in self-awareness  Provide psycho-education on self-esteem building and self-exploration  Teach/promote utilization of positive self-affirmations and critical observation skills    Patient has reviewed and agreed to the above plan.      Judd Clemons, [facility]  April 26, 2023

## 2023-06-06 ENCOUNTER — DOCUMENTATION ONLY (OUTPATIENT)
Dept: BEHAVIORAL HEALTH | Facility: HOSPITAL | Age: 28
End: 2023-06-06
Payer: COMMERCIAL

## 2023-06-06 DIAGNOSIS — Z53.9 NO SHOW: Primary | ICD-10-CM

## 2023-06-06 NOTE — PROGRESS NOTES
Therapist (writer) entered the virtual session at the scheduled time of the appointment. Pt did not arrive on time and therapist called pt 10 minutes into the appointment, leaving a VM prompting them to join via [employer] or call the office or reach out through [employer] if they were having an issue. Therapist waited an addition 10-15 minutes for pt to join and when they did not the session was cancelled.

## 2023-06-13 ENCOUNTER — VIRTUAL VISIT (OUTPATIENT)
Dept: BEHAVIORAL HEALTH | Facility: CLINIC | Age: 28
End: 2023-06-13
Payer: COMMERCIAL

## 2023-06-13 DIAGNOSIS — F43.21 ADJUSTMENT DISORDER WITH DEPRESSED MOOD: Primary | ICD-10-CM

## 2023-06-13 PROCEDURE — 90834 PSYTX W PT 45 MINUTES: CPT | Mod: VID | Performed by: COUNSELOR

## 2023-06-13 NOTE — PROGRESS NOTES
[facility]                                     Progress Note    Patient Name: Sofi Escobedo  Date: 6/13/23         Service Type: Individual      Session Start Time: 11:02am  Session End Time: 11:50am     Session Length: 48 minutes    Session #: 5    Attendees: Client attended alone    Service Modality:  Video Visit:      Provider verified identity through the following two step process.  Patient provided:  Patient is known previously to provider    Telemedicine Visit: The patient's condition can be safely assessed and treated via synchronous audio and visual telemedicine encounter.      Reason for Telemedicine Visit: Services only offered telehealth    Originating Site (Patient Location): Patient's home    Distant Site (Provider Location): Provider Remote Setting- Home Office    Consent:  The patient/guardian has verbally consented to: the potential risks and benefits of telemedicine (video visit) versus in person care; bill my insurance or make self-payment for services provided; and responsibility for payment of non-covered services.     Patient would like the video invitation sent by:  My Chart    Mode of Communication:  Video Conference via [employer]    Distant Location (Provider):  Off-site    As the provider I attest to compliance with applicable laws and regulations related to telemedicine.    DATA  Interactive Complexity: No  Crisis: No  Extended Session (53+ minutes): No     Progress Since Last Session (Related to Symptoms / Goals / Homework):   Symptoms: Improving symptoms overall    Homework: Partially completed      Episode of Care Goals: Satisfactory progress - ACTION (Actively working towards change); Intervened by reinforcing change plan / affirming steps taken     Current / Ongoing Stressors and Concerns:   Pt is currently seeking therapy due to ongoing anxiety/depression and to better manage his temper. Since last session, pt reported that his visit to [place] was good  "but bitter sweet, clarifying that he enjoyed going there and yet while there he was reminded why he left. Pt noticed that since his dads accident he has been \"different\" and talking with him was challenging at times. Pt also expressed frustrated with his sisters due to them being overbearing/demanding and directly told him that he never says \"no\" to them, so they just keep asking him for stuff. Pt verbalized how he feels when he does try to say \"no\", he gets harped on and guilt-tripped into saying \"yes\". Pt processed through how he has always had a hard time saying \"no\" and how his past experiences have informed this difficulty. Pt now recognizes that he needs to work on saying no, otherwise his relationships start to feel paid for (ie. he gives other money so they give him affection). Pt expressed that going to [facility] has helped change his perspective from the feedback he got from others while there and how he can apply this new perspective going forward in his relationships. Pt discussed how he has talked with his girlfriend since getting back from [facility] and thinks he is willing to give it a try, but recognizes he needs to practice saying \"no\" to her.      Treatment Objective(s) Addressed in This Session:   identify and compliment positives at least 3 times daily to support positive self-image  identify triggers, thoughts, and current stressors which contribute to feelings of anxiety  identify patterns of escalation  (i.e. tightness in chest, flushed face, increased heart rate, clenched hands, etc.)  identify at least 3 techniques for intervening on the escalation  use cognitive strategies identified in therapy to challenge anxious thoughts  Decrease frequency and intensity of feeling down, depressed, hopeless  Identify negative self-talk and behaviors: challenge core beliefs, myths, and actions  identify two areas of life that you would like to have improved functioning  identify at least 3 self-care " activities     Intervention:   CBT: Reviewed recently behavior patterns and reinforcing cycle  Motivational Interviewing    MI Intervention: Expressed Empathy/Understanding, Supported Autonomy, Collaboration, Evocation, Open-ended questions, Reflections: simple and complex, Change talk (evoked) and Reframe     Change Talk Expressed by the Patient: Desire to change Ability to change Reasons to change Committment to change Activation Taking steps    Provider Response to Change Talk: E - Evoked more info from patient about behavior change, A - Affirmed patient's thoughts, decisions, or attempts at behavior change, R - Reflected patient's change talk and S - Summarized patient's change talk statements    Psychodynamic: Processed through internal-experiences related to anger and frustration management  Solution Focused: Identified immediate areas of concern and potential barriers to success in relationships    Assessments completed prior to visit:  PHQ2:       6/13/2023     8:13 AM 3/7/2023    11:25 AM 3/7/2023    11:24 AM   PHQ-2 ( 1999 [employer])   Q1: Little interest or pleasure in doing things 1 1 1   Q2: Feeling down, depressed or hopeless 0 0 0   PHQ-2 Score 1 1 1   Q1: Little interest or pleasure in doing things Several days Several days Several days   Q2: Feeling down, depressed or hopeless Not at all Not at all Not at all   PHQ-2 Score 1 1 1     PHQ9:       4/12/2023     4:37 PM   PHQ-9 SCORE   PHQ-9 Total Score MyChart 5 (Mild depression)   PHQ-9 Total Score 5     GAD2:       4/12/2023     4:53 PM   BISHOP-2   Feeling nervous, anxious, or on edge 1   Not being able to stop or control worrying 1   BISHOP-2 Total Score 2        ASSESSMENT: Current Emotional / Mental Status (status of significant symptoms):   Risk status (Self / Other harm or suicidal ideation)   Patient denies current fears or concerns for personal safety.   Patient denies current or recent suicidal ideation or behaviors.   Patient denies current or  "recent homicidal ideation or behaviors.   Patient denies current or recent self injurious behavior or ideation.   Patient denies other safety concerns.   Patient reports there has been no change in risk factors since their last session.     Patient reports there has been no change in protective factors since their last session.     Recommended that patient call 911 or go to the local ED should there be a change in any of these risk factors.     Appearance:   Appropriate    Eye Contact:   Good    Psychomotor Behavior: Normal    Attitude:   Cooperative  Interested Friendly Pleasant   Orientation:   All   Speech    Rate / Production: Normal/ Responsive Talkative    Volume:  Normal    Mood:    Anxious  Normal   Affect:    Appropriate    Thought Content:  Clear    Thought Form:  Coherent  Logical    Insight:    Good  and Intellectual Insight     Medication Review:   No current psychiatric medications prescribed     Medication Compliance:   NA     Changes in Health Issues:   None reported     Chemical Use Review:   Substance Use: Chemical use reviewed, no active concerns identified      Tobacco Use: No change in amount of tobacco use since last session.  Provided encouragement to quit   Patient declined discussion at this time    Diagnosis:  1. Adjustment disorder with depressed mood        Collateral Reports Completed:   Not Applicable    PLAN: (Patient Tasks / Therapist Tasks / Other)  Return to going to the gym  Practice being honest but nice about it   Work on saying \"no\"  Frame things as \"your are worth it\"  Make a list of needs and wants in a partner    Judd Clemons [facility]                                                         ______________________________________________________________________    Individual Treatment Plan    Patient's Name: Sofi Escobedo  YOB: 1995    Date of Creation: 4/26/23  Date Treatment Plan Last Reviewed/Revised: 4/26/23    DSM5 Diagnoses: Adjustment Disorders  " 309.0 (F43.21) With depressed mood  Psychosocial / Contextual Factors: Pt recently moved to [place] from [place] and has been dealing with the culture shift. Pt has also been working on trying to get himself established here which has been challenging.  PROMIS (reviewed every 90 days):   PROMIS-10 Scores        4/13/2023     6:31 AM   PROMIS-10 Total Score w/o Sub Scores   PROMIS TOTAL - SUBSCORES 25       Referral / Collaboration:  Referral to another professional/service is not indicated at this time..    Anticipated number of session for this episode of care: 9-12 sessions  Anticipation frequency of session: Weekly  Anticipated Duration of each session: 38-52 minutes  Treatment plan will be reviewed in 90 days or when goals have been changed.       MeasurableTreatment Goal(s) related to diagnosis / functional impairment(s)  Goal 1: Patient will better manage his anger/frustration, not getting as easily aggravated, and not letting comments get to him as easily.    I will know I've met my goal when I am able to not let others get to me so much.      Objective #A (Patient Action)    Patient will identify triggers, thoughts, and current stressors which contribute to feelings of anxiety  identify patterns of escalation  (i.e. tightness in chest, flushed face, increased heart rate, clenched hands, etc.)  identify at least 3 techniques for intervening on the escalation  use cognitive strategies identified in therapy to challenge anxious thoughts  Increase interest, engagement, and pleasure in doing things  Decrease frequency and intensity of feeling down, depressed, hopeless  Identify negative self-talk and behaviors: challenge core beliefs, myths, and actions  Improve concentration, focus, and mindfulness in daily activities   identify 3 coping strategies for improving mood  learn at least 3 self-regulation strategies.  Status: New - Date: 4/26/23     Intervention(s)  Therapist will assign homework related to target objective  with the intent to promote pt autonomy and better manage MH.  Facilitate increase in self-awareness  Provide psycho-education on emotion identification/regulation and coping skills  Teach/promote utilization of mindfulness skills and grounding    Goal 2: Patient will feel worthy and be able to find validation internally.     I will know I've met my goal when able to feel more complete or worthy without needing it externally.      Objective #A (Patient Action)    Status: New - Date: 4/26/23     Patient will identify and compliment positives at least 3 times daily to support positive self-image  Decrease frequency and intensity of feeling down, depressed, hopeless  Identify negative self-talk and behaviors: challenge core beliefs, myths, and actions  identify 5 traits or charcteristics you like about yourself  identify at least 3 self-care activities.    Intervention(s)  Therapist will assign homework related to target objective with the intent to promote pt autonomy and better manage MH.  Facilitate increase in self-awareness  Provide psycho-education on self-care/compassion and narrative therapy interventions  Teach/promote utilization of reframing and boundary setting    Objective #B Being more authentic with myself and others through improving my self-esteem.   Patient will participate in social activities to improve mood  learn & utilize at least 3 assertive communication skills weekly  identify 5 traits or charcteristics you like about yourself  identify at least 3 adaptive coping statements to counteract negative thoughts.    Status: New - Date: 4/26/23     Intervention(s)  Therapist will assign homework related to target objective with the intent to promote pt autonomy and better manage MH.  Facilitate increase in self-awareness  Provide psycho-education on self-esteem building and self-exploration  Teach/promote utilization of positive self-affirmations and critical observation skills    Patient has reviewed  and agreed to the above plan.      Judd Clemons, [facility]  April 26, 2023

## 2023-06-20 ENCOUNTER — VIRTUAL VISIT (OUTPATIENT)
Dept: BEHAVIORAL HEALTH | Facility: CLINIC | Age: 28
End: 2023-06-20
Payer: COMMERCIAL

## 2023-06-20 DIAGNOSIS — F43.21 ADJUSTMENT DISORDER WITH DEPRESSED MOOD: Primary | ICD-10-CM

## 2023-06-20 PROCEDURE — 90837 PSYTX W PT 60 MINUTES: CPT | Mod: VID | Performed by: COUNSELOR

## 2023-06-20 NOTE — PROGRESS NOTES
[facility]                                     Progress Note    Patient Name: Sofi Escobedo  Date: 6/20/23         Service Type: Individual      Session Start Time: 11:01am  Session End Time: 11:57am     Session Length: 56 minutes    Session #: 6    Attendees: Client attended alone    Service Modality:  Video Visit:      Provider verified identity through the following two step process.  Patient provided:  Patient is known previously to provider    Telemedicine Visit: The patient's condition can be safely assessed and treated via synchronous audio and visual telemedicine encounter.      Reason for Telemedicine Visit: Services only offered telehealth    Originating Site (Patient Location): Patient's home    Distant Site (Provider Location): Provider Remote Setting- Home Office    Consent:  The patient/guardian has verbally consented to: the potential risks and benefits of telemedicine (video visit) versus in person care; bill my insurance or make self-payment for services provided; and responsibility for payment of non-covered services.     Patient would like the video invitation sent by:  My Chart    Mode of Communication:  Video Conference via Amwell    Distant Location (Provider):  Off-site    As the provider I attest to compliance with applicable laws and regulations related to telemedicine.    DATA  Interactive Complexity: No  Crisis: No  Extended Session (53+ minutes): PROLONGED SERVICE IN THE OUTPATIENT SETTING REQUIRING DIRECT (FACE-TO-FACE) PATIENT CONTACT BEYOND THE USUAL SERVICE:    - Patient's presenting concerns require more intensive intervention than could be completed within the usual service     Progress Since Last Session (Related to Symptoms / Goals / Homework):   Symptoms: Worsening symptoms pt has felt more frustated/irritable this past week    Homework: Partially completed      Episode of Care Goals: Satisfactory progress - ACTION (Actively working towards  change); Intervened by reinforcing change plan / affirming steps taken     Current / Ongoing Stressors and Concerns:   Pt is currently seeking therapy due to ongoing anxiety/depression and to better manage his temper. Since last session, pt reported that he has been feeling more irritated/anoyed in his current relationship, verbalizing how he has been more aware of differences in their values and maturity levels. Pt processed through his feelings and thoughts that he has been dealing with since getting back from [place]. Pt reflected at length that a lot of their interactions have reinforced to him that he is not getting his needs met in this relationship, to the point that he is feeling used. Furthermore, pt discussed how he noticed that he has a pattern of sacrificing himself for his partner, and is now realizing that he can't do that anymore as it just causes resentment or limits his ability to meet his own goals. Pt thinks he needs to start setting more boundaries and discussed how he can start doing this in his current relationship. Pt processed through a weird exchange he had with an ex recently, that he brought his current relationship situation into focus for him. Pt reflected that this message was really meaningful as it expressed gratitude towards him for the relationship they had way back when, and while it was unexpected, felt validating. Pt expressed how his ex was a better fit in many ways and how his current relationship compares to that, which makes it more clear to him that it doesn't feel sustainable. Pt verbalized his impression of at what point he would need to call off this relationship as he recognizes that he shouldn't stay in relationship just to not be alone. Session took longer then anticipated due to presenting concerns.      Treatment Objective(s) Addressed in This Session:   identify and compliment positives at least 3 times daily to support positive self-image  identify triggers,  thoughts, and current stressors which contribute to feelings of anxiety  identify patterns of escalation  (i.e. tightness in chest, flushed face, increased heart rate, clenched hands, etc.)  identify at least 3 techniques for intervening on the escalation  use cognitive strategies identified in therapy to challenge anxious thoughts  Decrease frequency and intensity of feeling down, depressed, hopeless  Identify negative self-talk and behaviors: challenge core beliefs, myths, and actions  identify two areas of life that you would like to have improved functioning  identify at least 3 self-care activities     Intervention:   CBT: Reviewed recently behavior patterns and reinforcing cycle  Motivational Interviewing    MI Intervention: Expressed Empathy/Understanding, Supported Autonomy, Collaboration, Evocation, Open-ended questions, Reflections: simple and complex, Change talk (evoked) and Reframe     Change Talk Expressed by the Patient: Ability to change Reasons to change Need to change Committment to change Activation Taking steps    Provider Response to Change Talk: E - Evoked more info from patient about behavior change, A - Affirmed patient's thoughts, decisions, or attempts at behavior change, R - Reflected patient's change talk and S - Summarized patient's change talk statements    Psychodynamic: Processed through internal-experiences related to anger and frustration management  Solution Focused: Identified immediate areas of concern and potential barriers to success in relationships    Assessments completed prior to visit:  PHQ2:       6/19/2023     4:01 PM 6/13/2023     8:13 AM 3/7/2023    11:25 AM 3/7/2023    11:24 AM   PHQ-2 ( 1999 [employer])   Q1: Little interest or pleasure in doing things 0 1 1 1   Q2: Feeling down, depressed or hopeless 0 0 0 0   PHQ-2 Score 0 1 1 1   Q1: Little interest or pleasure in doing things Not at all Several days Several days Several days   Q2: Feeling down, depressed or hopeless  Not at all Not at all Not at all Not at all   PHQ-2 Score 0 1 1 1     PHQ9:       4/12/2023     4:37 PM   PHQ-9 SCORE   PHQ-9 Total Score MyChart 5 (Mild depression)   PHQ-9 Total Score 5     GAD2:       4/12/2023     4:53 PM   BISHOP-2   Feeling nervous, anxious, or on edge 1   Not being able to stop or control worrying 1   BISHOP-2 Total Score 2        ASSESSMENT: Current Emotional / Mental Status (status of significant symptoms):   Risk status (Self / Other harm or suicidal ideation)   Patient denies current fears or concerns for personal safety.   Patient denies current or recent suicidal ideation or behaviors.   Patient denies current or recent homicidal ideation or behaviors.   Patient denies current or recent self injurious behavior or ideation.   Patient denies other safety concerns.   Patient reports there has been no change in risk factors since their last session.     Patient reports there has been no change in protective factors since their last session.     Recommended that patient call 911 or go to the local ED should there be a change in any of these risk factors.     Appearance:   Appropriate    Eye Contact:   Good    Psychomotor Behavior: Normal    Attitude:   Cooperative  Interested Friendly Pleasant   Orientation:   All   Speech    Rate / Production: Normal/ Responsive Talkative    Volume:  Normal    Mood:    Anxious  Normal   Affect:    Appropriate    Thought Content:  Clear    Thought Form:  Coherent  Logical    Insight:    Fair  and Intellectual Insight     Medication Review:   No current psychiatric medications prescribed     Medication Compliance:   NA     Changes in Health Issues:   None reported     Chemical Use Review:   Substance Use: Chemical use reviewed, no active concerns identified      Tobacco Use: No change in amount of tobacco use since last session.  Provided encouragement to quit   Patient declined discussion at this time    Diagnosis:  1. Adjustment disorder with depressed mood   "      Collateral Reports Completed:   Not Applicable    PLAN: (Patient Tasks / Therapist Tasks / Other)  Try going to the gym twice a week  Practice being honest but nice about it   Keep working on saying \"no\"  Make a list of needs and wants in a partner  Do similarities and differences exercise with girlfriend    Judd Clemons, [facility]                                                         ______________________________________________________________________    Individual Treatment Plan    Patient's Name: Sofi Escobedo  YOB: 1995    Date of Creation: 4/26/23  Date Treatment Plan Last Reviewed/Revised: 4/26/23    DSM5 Diagnoses: Adjustment Disorders  309.0 (F43.21) With depressed mood  Psychosocial / Contextual Factors: Pt recently moved to [place] from [place] and has been dealing with the culture shift. Pt has also been working on trying to get himself established here which has been challenging.  PROMIS (reviewed every 90 days):   PROMIS-10 Scores        4/13/2023     6:31 AM   PROMIS-10 Total Score w/o Sub Scores   PROMIS TOTAL - SUBSCORES 25       Referral / Collaboration:  Referral to another professional/service is not indicated at this time..    Anticipated number of session for this episode of care: 9-12 sessions  Anticipation frequency of session: Weekly  Anticipated Duration of each session: 38-52 minutes  Treatment plan will be reviewed in 90 days or when goals have been changed.       MeasurableTreatment Goal(s) related to diagnosis / functional impairment(s)  Goal 1: Patient will better manage his anger/frustration, not getting as easily aggravated, and not letting comments get to him as easily.    I will know I've met my goal when I am able to not let others get to me so much.      Objective #A (Patient Action)    Patient will identify triggers, thoughts, and current stressors which contribute to feelings of anxiety  identify patterns of escalation  (i.e. tightness in chest, flushed " face, increased heart rate, clenched hands, etc.)  identify at least 3 techniques for intervening on the escalation  use cognitive strategies identified in therapy to challenge anxious thoughts  Increase interest, engagement, and pleasure in doing things  Decrease frequency and intensity of feeling down, depressed, hopeless  Identify negative self-talk and behaviors: challenge core beliefs, myths, and actions  Improve concentration, focus, and mindfulness in daily activities   identify 3 coping strategies for improving mood  learn at least 3 self-regulation strategies.  Status: New - Date: 4/26/23     Intervention(s)  Therapist will assign homework related to target objective with the intent to promote pt autonomy and better manage MH.  Facilitate increase in self-awareness  Provide psycho-education on emotion identification/regulation and coping skills  Teach/promote utilization of mindfulness skills and grounding    Goal 2: Patient will feel worthy and be able to find validation internally.     I will know I've met my goal when able to feel more complete or worthy without needing it externally.      Objective #A (Patient Action)    Status: New - Date: 4/26/23     Patient will identify and compliment positives at least 3 times daily to support positive self-image  Decrease frequency and intensity of feeling down, depressed, hopeless  Identify negative self-talk and behaviors: challenge core beliefs, myths, and actions  identify 5 traits or charcteristics you like about yourself  identify at least 3 self-care activities.    Intervention(s)  Therapist will assign homework related to target objective with the intent to promote pt autonomy and better manage MH.  Facilitate increase in self-awareness  Provide psycho-education on self-care/compassion and narrative therapy interventions  Teach/promote utilization of reframing and boundary setting    Objective #B Being more authentic with myself and others through  improving my self-esteem.   Patient will participate in social activities to improve mood  learn & utilize at least 3 assertive communication skills weekly  identify 5 traits or charcteristics you like about yourself  identify at least 3 adaptive coping statements to counteract negative thoughts.    Status: New - Date: 4/26/23     Intervention(s)  Therapist will assign homework related to target objective with the intent to promote pt autonomy and better manage MH.  Facilitate increase in self-awareness  Provide psycho-education on self-esteem building and self-exploration  Teach/promote utilization of positive self-affirmations and critical observation skills    Patient has reviewed and agreed to the above plan.      Judd Clemons, [facility]  April 26, 2023

## 2023-06-25 DIAGNOSIS — R79.89 ELEVATED SERUM CREATININE: Primary | ICD-10-CM

## 2023-06-25 NOTE — PROGRESS NOTES
Nephrology Clinic Visit  Sofi Escobedo MRN: [id number] YOB: 1995  Primary Care Provider: No Ref-Primary, Physician    Video-Visit Details  Patient evaluated by billable video visit  Video Start Time: 8:00AM  Type of service:  Video Visit  Video End Time:8:36 AM  Originating Location (pt. Location): Home  Distant Location (provider location):  Off-site  Platform used for Video Visit: Celeste  ----------------------------------------------------------------------------------------------------------------------  Visit 6/28/2023:  -BP Control: 146/84 in the office today. Taking his home BP about 1x per week or so. 170's to 190's typically at home. Diastolic in the 90's. He says a reading in the 140's is rare and really good for him (his reading in the office). Reports onset of HTN around age 17 or 18. Denies ever being on anti-HTN in the past.   --Current Regimen: Nifedipine 30mg qDay  -Creatinine trend: 1.31 (6/28/2023) from 1.24 (3/7/2023)  -Renal US 4/10/2023: 12.9/11.6cm, No masses, no stones, no hydro, no elevated PVR  -Cystatin C eGFR:  Pending  -Denies Creatine supplements. Denies Steroids/Testosterone use    Visit 3/30/2023:  -BP Control: He does have a diagnosis of high blood pressure. He thinks this has been the case for quite some time. BP recently 180/86, 150/93.   --Current Regimen: None, has not been on BP medications in the past either.   -DM Control: No  --Current Regimen: None  -Creatinine Trend: 1.2  -Nocturia: 2-3x   -Hematuria:  -Nephrolithiasis: No  -NSAID Use: No  -Herbal/OTC Medication Use: Olli sleep gummies, Magnesium, Fish oil, Green Food Powder, Multivitamin  -Family History of Kidney Disease: HTN (Cousin w/ HTN, Strokes, CKD), no one else that he can think  -Family/Friends on RRT/Kidney Transplant: No/No    -Other:  --Hx of Thalassemia: He doesn't know much about this. Thinks he was diagnosed when he was 15 or so. Mom's side of the family has this.   --Hx of iron  overload:  --2 years ago in [facility] in he had COVID. At that time he had tests done and Dr's reported that there was something wrong with his kidneys. He's also had issues with Urinary Tract infection/Flank Pain. He's noted that on occasion. He's also had some lower abdominal discomfort when urinating. The last time this happened was about 2 years ago. He had some fevers associated. He was treated with antibiotics and after 2 days started to feel better. He reports gross hematuria (dark brown urine when his stream starts then normal color then maybe passing some blood clots). Symptoms always occur in conjunction with urinary tract infections. His last UTI was about 2 years.     Objective:  PAST MEDICAL HISTORY:  No past medical history on file.    PAST SURGICAL HISTORY:  Past Surgical History:   Procedure Laterality Date     ANKLE SURGERY Right 2019    Ligament surgery       MEDICATIONS:  No current outpatient medications    FAMILY MEDICAL HISTORY:   Family History   Problem Relation Age of Onset     Hypertension Mother      Hypertension Father      Diabetes Paternal Grandmother      Hypertension Paternal Grandmother      Diabetes Paternal Grandfather      Hypertension Paternal Uncle      Hypertension Paternal Aunt        PHYSICAL EXAM:   BP (!) 146/84   Pulse 71   Temp 97.9  F (36.6  C) (Oral)   Wt 120.3 kg (265 lb 3.2 oz)   SpO2 98%   BMI 39.74 kg/m    GENERAL APPEARANCE: alert and no distress  EYES: nonicteric  ABDOMEN: soft, nontender, normal bowel sounds, no HSM   Extremities: No LE edema  MS: no evidence of inflammation in joints, no muscle tenderness  SKIN: no rash  NEURO: mentation intact and speech normal  PSYCH: affect normal    LABS REVIEWED BY ME:   ANEMIA  Recent Labs   Lab Test 03/07/23  1240   HGB 11.9*       BMP  Recent Labs   Lab Test 03/07/23  1240      POTASSIUM 4.3   CHLORIDE 107   CO2 22   ANIONGAP 12   BUN 12.0   CR 1.24*   GFRESTIMATED 81       CBC  Recent Labs   Lab Test  "03/07/23  1240   HGB 11.9*   WBC 4.1   HCT 37.0*   MCV 69*          DIABETESNo lab results found.    HYPONATREMIANo lab results found.    Invalid input(s): UOSM, OSM    MBD  Recent Labs   Lab Test 03/07/23  1240   SUELLEN 9.6        URINE STUDIES  Recent Labs   Lab Test 03/20/23  1124   COLOR Light Yellow   APPEARANCE Clear   URINEGLC Negative   URINEBILI Negative   URINEKETONE Negative   SG 1.015   UBLD Negative   URINEPH 5.5   PROTEIN Negative   NITRITE Negative   LEUKEST Negative   RBCU <1   WBCU 4     No lab results found.    ADDITIONAL LABS ORDERED/REVIEWED BY ME:  See below    Assessment/Plan  CKD Stage G1A1  Established care with [facility] 3/30/2023    Referred for Creatinine of 1.2 on 3/7/2023. He has no proteinuria/albuminuria on 3/20/2023. He has no hematuria/pyuria on UA 3/20/2023. No renal imaging at time of referral. Hx of recurrent UTIs and 2-3x per night nocturia.     Hx of Thalassemia    CKD Etiology (Biopsy Proven: No):  -Hypertensive Nephrosclerosis  -?Recurrent UTIs?  -?Underestimation of eGFR via Creatinine?  -?Anatomic Abnormality leading to recurrent UTIs/Urinary retention?    Plan:  -Need to get BP under control. He reports BP readings in the 150's and having high blood pressure for \"a long time\" (since age 17 or so). Nifedipine to 60mg qAM and 30mg qPM  -Secondary HTN workup with Renin/David and Renal US w/ duplex  -Monitor BP at home daily and send me readings in 7 days  -Follow up Cystatin-C  -Avoid NSAIDs/nephrotoxic agetns  -If develops proteinuric kidney disease, start SGLT2i      Anemia of Renal Disease  Hemoglobin: 11.9  Ferritin:  TSAT:    Plan:  -Hx of Thalassemia. No need for IV iron/EPO at this time      Lipid Management in CKD  KDIGO 2013 Guidelines recommend the following for patients with CKD:  Adults >/= 49 yo w/ CKD stage 1 or 2: Statin  Adults >/= 49 yo w/ CKD stage 3-5: Statin + Ezetimibe or Statin alone  Adults 18-49 + 1 of the following (CAD, DM, Ischemic stroke, 10 " yr ASCVD risk >10%): Statin    KDIGO guidelines recommend Simvastatin 20mg/Ezetimibe 10mg qDay for patients with CKD G3a-G5 (in line with the SHARP trial). If Simvastatin used alone, 40mg qDay is referenced.   Other options include: Atorvastatin 20mg qDay (4D trial) and Rosuvastatin 10mg qDay (CRYSTAL trial)    Plan:  -No indication for Statin at this time      Metabolic Acidosis of Renal Disease  Bicarb: 22    Plan:  -No need for NaHCO at this time      Mineral Bone Disease  PTH: N/A  Phos:  Calcium:  Vitamin D:    Plan:  -No need for phos binder at this time    Other:  -Specialty Care Coordination Referral - CKD G1-G3b w/o imminent RRT planning/needs (Yes/no, Date Referral Placed): No  -Med Therapy Management Referral (Yes/No, Date of Referral): No    Return to clinic: 2 months    Yosef Segovia MD   of Medicine  Division of Nephrology and Hypertension  [facility]    25 minutes spent on the date of the encounter doing chart review, history and exam, documentation and further activities as noted above

## 2023-06-28 ENCOUNTER — LAB (OUTPATIENT)
Dept: LAB | Facility: CLINIC | Age: 28
End: 2023-06-28
Payer: COMMERCIAL

## 2023-06-28 ENCOUNTER — OFFICE VISIT (OUTPATIENT)
Dept: NEPHROLOGY | Facility: CLINIC | Age: 28
End: 2023-06-28
Attending: STUDENT IN AN ORGANIZED HEALTH CARE EDUCATION/TRAINING PROGRAM
Payer: COMMERCIAL

## 2023-06-28 VITALS
BODY MASS INDEX: 39.74 KG/M2 | HEART RATE: 71 BPM | SYSTOLIC BLOOD PRESSURE: 146 MMHG | TEMPERATURE: 97.9 F | OXYGEN SATURATION: 98 % | WEIGHT: 265.2 LBS | DIASTOLIC BLOOD PRESSURE: 84 MMHG

## 2023-06-28 DIAGNOSIS — R79.89 ELEVATED SERUM CREATININE: ICD-10-CM

## 2023-06-28 DIAGNOSIS — N28.9 RENAL IMPAIRMENT: ICD-10-CM

## 2023-06-28 DIAGNOSIS — I10 HYPERTENSION, ESSENTIAL: Primary | ICD-10-CM

## 2023-06-28 LAB
ANION GAP SERPL CALCULATED.3IONS-SCNC: 9 MMOL/L (ref 7–15)
BUN SERPL-MCNC: 12.1 MG/DL (ref 6–20)
CALCIUM SERPL-MCNC: 9.5 MG/DL (ref 8.6–10)
CHLORIDE SERPL-SCNC: 103 MMOL/L (ref 98–107)
CREAT SERPL-MCNC: 1.31 MG/DL (ref 0.67–1.17)
CYSTATIN C (ROCHE): 0.8 MG/L (ref 0.6–1)
DEPRECATED HCO3 PLAS-SCNC: 26 MMOL/L (ref 22–29)
GFR SERPL CREATININE-BSD FRML MDRD: 76 ML/MIN/1.73M2
GFR SERPL CREATININE-BSD FRML MDRD: >90 ML/MIN/1.73M2
GLUCOSE SERPL-MCNC: 102 MG/DL (ref 70–99)
POTASSIUM SERPL-SCNC: 3.7 MMOL/L (ref 3.4–5.3)
SODIUM SERPL-SCNC: 138 MMOL/L (ref 136–145)

## 2023-06-28 PROCEDURE — 36415 COLL VENOUS BLD VENIPUNCTURE: CPT | Performed by: PATHOLOGY

## 2023-06-28 PROCEDURE — 80048 BASIC METABOLIC PNL TOTAL CA: CPT | Performed by: PATHOLOGY

## 2023-06-28 PROCEDURE — G0463 HOSPITAL OUTPT CLINIC VISIT: HCPCS | Performed by: STUDENT IN AN ORGANIZED HEALTH CARE EDUCATION/TRAINING PROGRAM

## 2023-06-28 PROCEDURE — 99214 OFFICE O/P EST MOD 30 MIN: CPT | Mod: 95 | Performed by: STUDENT IN AN ORGANIZED HEALTH CARE EDUCATION/TRAINING PROGRAM

## 2023-06-28 PROCEDURE — 82610 CYSTATIN C: CPT | Performed by: STUDENT IN AN ORGANIZED HEALTH CARE EDUCATION/TRAINING PROGRAM

## 2023-06-28 PROCEDURE — 99000 SPECIMEN HANDLING OFFICE-LAB: CPT | Performed by: PATHOLOGY

## 2023-06-28 RX ORDER — NIFEDIPINE 30 MG
TABLET, EXTENDED RELEASE ORAL
Qty: 90 TABLET | Refills: 3
Start: 2023-06-28 | End: 2023-11-09

## 2023-06-28 ASSESSMENT — PAIN SCALES - GENERAL: PAINLEVEL: NO PAIN (0)

## 2023-06-28 NOTE — LETTER
6/28/2023       RE: Sofi Escobedo  [address]  [facility] [address]     Dear Colleague,    Thank you for referring your patient, Sofi Escobedo, to the [facility] NEPHROLOGY CLINIC [place] at [facility]. Please see a copy of my visit note below.        Nephrology Clinic Visit  Sofi Escobedo MRN: [id number] YOB: 1995  Primary Care Provider: No Ref-Primary, Physician    Video-Visit Details  Patient evaluated by billable video visit  Video Start Time: 8:00AM  Type of service:  Video Visit  Video End Time:8:36 AM  Originating Location (pt. Location): Home  Distant Location (provider location):  Off-site  Platform used for Video Visit: Fan[employer]  ----------------------------------------------------------------------------------------------------------------------  Visit 6/28/2023:  -BP Control: 146/84 in the office today. Taking his home BP about 1x per week or so. 170's to 190's typically at home. Diastolic in the 90's. He says a reading in the 140's is rare and really good for him (his reading in the office). Reports onset of HTN around age 17 or 18. Denies ever being on anti-HTN in the past.   --Current Regimen: Nifedipine 30mg qDay  -Creatinine trend: 1.31 (6/28/2023) from 1.24 (3/7/2023)  -Renal US 4/10/2023: 12.9/11.6cm, No masses, no stones, no hydro, no elevated PVR  -Cystatin C eGFR:  Pending  -Denies Creatine supplements. Denies Steroids/Testosterone use    Visit 3/30/2023:  -BP Control: He does have a diagnosis of high blood pressure. He thinks this has been the case for quite some time. BP recently 180/86, 150/93.   --Current Regimen: None, has not been on BP medications in the past either.   -DM Control: No  --Current Regimen: None  -Creatinine Trend: 1.2  -Nocturia: 2-3x   -Hematuria:  -Nephrolithiasis: No  -NSAID Use: No  -Herbal/OTC Medication Use: Olli sleep gummies, Magnesium, Fish oil, Green  Food Powder, Multivitamin  -Family History of Kidney Disease: HTN (Cousin w/ HTN, Strokes, CKD), no one else that he can think  -Family/Friends on RRT/Kidney Transplant: No/No    -Other:  --Hx of Thalassemia: He doesn't know much about this. Thinks he was diagnosed when he was 15 or so. Mom's side of the family has this.   --Hx of iron overload:  --2 years ago in [facility] in he had COVID. At that time he had tests done and 's reported that there was something wrong with his kidneys. He's also had issues with Urinary Tract infection/Flank Pain. He's noted that on occasion. He's also had some lower abdominal discomfort when urinating. The last time this happened was about 2 years ago. He had some fevers associated. He was treated with antibiotics and after 2 days started to feel better. He reports gross hematuria (dark brown urine when his stream starts then normal color then maybe passing some blood clots). Symptoms always occur in conjunction with urinary tract infections. His last UTI was about 2 years.     Objective:  PAST MEDICAL HISTORY:  No past medical history on file.    PAST SURGICAL HISTORY:  Past Surgical History:   Procedure Laterality Date    ANKLE SURGERY Right 2019    Ligament surgery       MEDICATIONS:  No current outpatient medications    FAMILY MEDICAL HISTORY:   Family History   Problem Relation Age of Onset    Hypertension Mother     Hypertension Father     Diabetes Paternal Grandmother     Hypertension Paternal Grandmother     Diabetes Paternal Grandfather     Hypertension Paternal Uncle     Hypertension Paternal Aunt        PHYSICAL EXAM:   BP (!) 146/84   Pulse 71   Temp 97.9  F (36.6  C) (Oral)   Wt 120.3 kg (265 lb 3.2 oz)   SpO2 98%   BMI 39.74 kg/m    GENERAL APPEARANCE: alert and no distress  EYES: nonicteric  ABDOMEN: soft, nontender, normal bowel sounds, no HSM   Extremities: No LE edema  MS: no evidence of inflammation in joints, no muscle tenderness  SKIN: no rash  NEURO: mentation  "intact and speech normal  PSYCH: affect normal    LABS REVIEWED BY ME:   ANEMIA  Recent Labs   Lab Test 03/07/23  1240   HGB 11.9*       BMP  Recent Labs   Lab Test 03/07/23  1240      POTASSIUM 4.3   CHLORIDE 107   CO2 22   ANIONGAP 12   BUN 12.0   CR 1.24*   GFRESTIMATED 81       CBC  Recent Labs   Lab Test 03/07/23  1240   HGB 11.9*   WBC 4.1   HCT 37.0*   MCV 69*          DIABETESNo lab results found.    HYPONATREMIANo lab results found.    Invalid input(s): UOSM, OSM    MBD  Recent Labs   Lab Test 03/07/23  1240   SUELLEN 9.6        URINE STUDIES  Recent Labs   Lab Test 03/20/23  1124   COLOR Light Yellow   APPEARANCE Clear   URINEGLC Negative   URINEBILI Negative   URINEKETONE Negative   SG 1.015   UBLD Negative   URINEPH 5.5   PROTEIN Negative   NITRITE Negative   LEUKEST Negative   RBCU <1   WBCU 4     No lab results found.    ADDITIONAL LABS ORDERED/REVIEWED BY ME:  See below    Assessment/Plan  CKD Stage G1A1  Established care with [facility] 3/30/2023    Referred for Creatinine of 1.2 on 3/7/2023. He has no proteinuria/albuminuria on 3/20/2023. He has no hematuria/pyuria on UA 3/20/2023. No renal imaging at time of referral. Hx of recurrent UTIs and 2-3x per night nocturia.     Hx of Thalassemia    CKD Etiology (Biopsy Proven: No):  -Hypertensive Nephrosclerosis  -?Recurrent UTIs?  -?Underestimation of eGFR via Creatinine?  -?Anatomic Abnormality leading to recurrent UTIs/Urinary retention?    Plan:  -Need to get BP under control. He reports BP readings in the 150's and having high blood pressure for \"a long time\" (since age 17 or so). Nifedipine to 60mg qAM and 30mg qPM  -Secondary HTN workup with Renin/David and Renal US w/ duplex  -Monitor BP at home daily and send me readings in 7 days  -Follow up Cystatin-C  -Avoid NSAIDs/nephrotoxic agetns  -If develops proteinuric kidney disease, start SGLT2i      Anemia of Renal Disease  Hemoglobin: 11.9  Ferritin:  TSAT:    Plan:  -Hx of Thalassemia. " No need for IV iron/EPO at this time      Lipid Management in CKD  KDIGO 2013 Guidelines recommend the following for patients with CKD:  Adults >/= 49 yo w/ CKD stage 1 or 2: Statin  Adults >/= 49 yo w/ CKD stage 3-5: Statin + Ezetimibe or Statin alone  Adults 18-49 + 1 of the following (CAD, DM, Ischemic stroke, 10 yr ASCVD risk >10%): Statin    KDIGO guidelines recommend Simvastatin 20mg/Ezetimibe 10mg qDay for patients with CKD G3a-G5 (in line with the SHARP trial). If Simvastatin used alone, 40mg qDay is referenced.   Other options include: Atorvastatin 20mg qDay (4D trial) and Rosuvastatin 10mg qDay (CRYSTAL trial)    Plan:  -No indication for Statin at this time      Metabolic Acidosis of Renal Disease  Bicarb: 22    Plan:  -No need for NaHCO at this time      Mineral Bone Disease  PTH: N/A  Phos:  Calcium:  Vitamin D:    Plan:  -No need for phos binder at this time    Other:  -Specialty Care Coordination Referral - CKD G1-G3b w/o imminent RRT planning/needs (Yes/no, Date Referral Placed): No  -Med Therapy Management Referral (Yes/No, Date of Referral): No    Return to clinic: 2 months    Yosef Segovia MD   of Medicine  Division of Nephrology and Hypertension  [facility]    25 minutes spent on the date of the encounter doing chart review, history and exam, documentation and further activities as noted above

## 2023-06-28 NOTE — PATIENT INSTRUCTIONS
"It was a pleasure to see you in nephrology clinic today. I've included a brief summary of our discussion and care plan from today's visit below.  _______________________________________________________________________    My recommendations are summarized as follows:  -Keep a Blood Pressure log. Please make sure that you are using a validated blood pressure device (check \"www.validatebp.org\").  -Avoid all NSAID's. Examples include Ibuprofen (Advil, Motrin), naprosyn (Aleve), celebrex among others. Acetaminophen (Tylenol) is ok with maximum dose in 24 hours of 3000mg.  -Healthy lifestyle measures will keep your kidney's functioning at their current best. This includes regular exercise, maintaining a healthy body weight and smoking cessation.   -Let's increase your Nifedipine to 60mg in the morning and 30mg in the evening. Send me blood pressure readings in 1 week via [employer].   -I have requested extra labs and another kidney ultrasound. Please have these done in the near future.     Please return to Nephrology Clinic in 2 months to review your progress.     Who do I call with any questions after my visit?  There are multiple ways to contact your nephrology care team:    -To schedule or reschedule an appointment, please call [phone].  -Reach out via [employer]. These messages are answered by your nurse or doctor during business hours and typically in 1-2 days. [employer] messages are best for quick questions/clarifications/updates. Frequently, your doctor or nurse will recommend setting up a follow up appointment to address any significant questions/concerns.  -For urgent questions after business hours, you may reach the on-call Nephrology Fellow by contacting the [facility]  at [phone].    To schedule imaging:   -Please call [phone]     To schedule your lab appointment at the [facility] and [facility]:  -Please call [phone]    Sincerely,    Dr. Yosef Segovia   of " Medicine  Division of Nephrology and Hypertension  [facility]

## 2023-06-28 NOTE — NURSING NOTE
Chief Complaint   Patient presents with     RECHECK     2 mo f/u       BP (!) 146/84   Pulse 71   Temp 97.9  F (36.6  C) (Oral)   Wt 120.3 kg (265 lb 3.2 oz)   SpO2 98%   BMI 39.74 kg/m      Jeovany Patterson on 6/28/2023 at 9:46 AM

## 2023-07-11 ENCOUNTER — VIRTUAL VISIT (OUTPATIENT)
Dept: BEHAVIORAL HEALTH | Facility: CLINIC | Age: 28
End: 2023-07-11
Payer: COMMERCIAL

## 2023-07-11 DIAGNOSIS — F43.21 ADJUSTMENT DISORDER WITH DEPRESSED MOOD: Primary | ICD-10-CM

## 2023-07-11 PROCEDURE — 90837 PSYTX W PT 60 MINUTES: CPT | Mod: VID | Performed by: COUNSELOR

## 2023-07-11 NOTE — PROGRESS NOTES
[facility]                                     Progress Note    Patient Name: Sofi Escobedo  Date: 7/11/23         Service Type: Individual      Session Start Time: 11:01am  Session End Time: 11:55am     Session Length: 54 minutes    Session #: 7    Attendees: Client attended alone    Service Modality:  Video Visit:      Provider verified identity through the following two step process.  Patient provided:  Patient is known previously to provider    Telemedicine Visit: The patient's condition can be safely assessed and treated via synchronous audio and visual telemedicine encounter.      Reason for Telemedicine Visit: Services only offered telehealth    Originating Site (Patient Location): Patient's home    Distant Site (Provider Location): Provider Remote Setting- Home Office    Consent:  The patient/guardian has verbally consented to: the potential risks and benefits of telemedicine (video visit) versus in person care; bill my insurance or make self-payment for services provided; and responsibility for payment of non-covered services.     Patient would like the video invitation sent by:  My Chart    Mode of Communication:  Video Conference via [employer]    Distant Location (Provider):  Off-site    As the provider I attest to compliance with applicable laws and regulations related to telemedicine.    DATA  Interactive Complexity: No  Crisis: No  Extended Session (53+ minutes): PROLONGED SERVICE IN THE OUTPATIENT SETTING REQUIRING DIRECT (FACE-TO-FACE) PATIENT CONTACT BEYOND THE USUAL SERVICE:    - Patient's presenting concerns require more intensive intervention than could be completed within the usual service     Progress Since Last Session (Related to Symptoms / Goals / Homework):   Symptoms: Worsening symptoms pt is feeling more isolated and less motivated    Homework: Achieved / completed to satisfaction      Episode of Care Goals: Satisfactory progress - PREPARATION (Decided to  change - considering how); Intervened by negotiating a change plan and determining options / strategies for behavior change, identifying triggers, exploring social supports, and working towards setting a date to begin behavior change     Current / Ongoing Stressors and Concerns:   Pt is currently seeking therapy due to ongoing anxiety/depression and to better manage his temper. Since last session, pt reported that he has been feeling a lot of financial stress and work has been really challenging with staff being inconsistent in enforcing the rules/expectations. Furthermore, he reported that he broke up with his girlfriend, which while difficult he knew was something he needed to do after their most recent conversation. 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 Pt processed through how his trip to [facility] and having to support his girlfriend has made his finances feel stretched. Pt reflected that this has caused him to feel more stress, which he would normally  extra shifts to address, but due to the challenges at work he has been feeling unmotivated to do so. Pt has applied for a new position at his work to help deal with this and after reflecting on it plans to stay at his current job even if he doesn't get this new position with the pros outweighing the cons. Pt processed through how he has recently been feeling more isolate and after his break-up how he hasn't been feeling very good. Pt clarified that a big part of this comes down to not feeling appreciated or cared for by others. Pt acknowledged that he is looking for this from outside of himself more then he should, and yet it is hard to not seek that out when it feels more meaningful coming from someone else. Pt reflected on how others can show him appreciation or that they care and how he can show that to  himself as well. Pt did reflect on how last week reconnected with a local friend and they actually did a number of things that fell into the ways he identified others could show this to him. After discussing it pt did say he felt better and that in retrospect last weeks interactions with his friend and their family was actually very positive/meaningful. Session took longer then anticipated due to presenting concerns.      Treatment Objective(s) Addressed in This Session:   identify and compliment positives at least 3 times daily to support positive self-image  identify triggers, thoughts, and current stressors which contribute to feelings of anxiety  identify patterns of escalation  (i.e. tightness in chest, flushed face, increased heart rate, clenched hands, etc.)  identify at least 3 techniques for intervening on the escalation  use cognitive strategies identified in therapy to challenge anxious thoughts  Decrease frequency and intensity of feeling down, depressed, hopeless  Identify negative self-talk and behaviors: challenge core beliefs, myths, and actions  identify two areas of life that you would like to have improved functioning  identify at least 3 self-care activities     Intervention:   CBT: Reviewed recently behavior patterns and reinforcing cycle  Motivational Interviewing    MI Intervention: Expressed Empathy/Understanding, Supported Autonomy, Collaboration, Evocation, Open-ended questions, Reflections: simple and complex, Change talk (evoked) and Reframe     Change Talk Expressed by the Patient: Ability to change Reasons to change Need to change Committment to change Activation Taking steps    Provider Response to Change Talk: E - Evoked more info from patient about behavior change, A - Affirmed patient's thoughts, decisions, or attempts at behavior change, R - Reflected patient's change talk and S - Summarized patient's change talk statements    Psychodynamic: Processed through internal-experiences  related to anger and frustration management  Solution Focused: Identified immediate areas of concern and potential barriers to success in relationships    Assessments completed prior to visit:  PHQ2:       6/19/2023     4:01 PM 6/13/2023     8:13 AM 3/7/2023    11:25 AM 3/7/2023    11:24 AM   PHQ-2 ( 1999 [employer])   Q1: Little interest or pleasure in doing things 0 1 1 1   Q2: Feeling down, depressed or hopeless 0 0 0 0   PHQ-2 Score 0 1 1 1   Q1: Little interest or pleasure in doing things Not at all Several days Several days Several days   Q2: Feeling down, depressed or hopeless Not at all Not at all Not at all Not at all   PHQ-2 Score 0 1 1 1     PHQ9:       4/12/2023     4:37 PM   PHQ-9 SCORE   PHQ-9 Total Score MyChart 5 (Mild depression)   PHQ-9 Total Score 5     GAD2:       4/12/2023     4:53 PM   BISHOP-2   Feeling nervous, anxious, or on edge 1   Not being able to stop or control worrying 1   BISHOP-2 Total Score 2        ASSESSMENT: Current Emotional / Mental Status (status of significant symptoms):   Risk status (Self / Other harm or suicidal ideation)   Patient denies current fears or concerns for personal safety.   Patient denies current or recent suicidal ideation or behaviors.   Patient denies current or recent homicidal ideation or behaviors.   Patient denies current or recent self injurious behavior or ideation.   Patient denies other safety concerns.   Patient reports there has been no change in risk factors since their last session.     Patient reports there has been no change in protective factors since their last session.     Recommended that patient call 911 or go to the local ED should there be a change in any of these risk factors.     Appearance:   Appropriate    Eye Contact:   Good    Psychomotor Behavior: Normal    Attitude:   Cooperative  Interested Friendly Pleasant   Orientation:   All   Speech    Rate / Production: Normal/ Responsive Talkative    Volume:  Normal    Mood:    Anxious  Normal  "Sad    Affect:    Appropriate    Thought Content:  Clear    Thought Form:  Coherent  Logical    Insight:    Fair  and Intellectual Insight     Medication Review:   No current psychiatric medications prescribed     Medication Compliance:   NA     Changes in Health Issues:   None reported     Chemical Use Review:   Substance Use: Chemical use reviewed, no active concerns identified      Tobacco Use: No change in amount of tobacco use since last session.  Provided encouragement to quit   Patient declined discussion at this time    Diagnosis:  1. Adjustment disorder with depressed mood        Collateral Reports Completed:   Not Applicable    PLAN: (Patient Tasks / Therapist Tasks / Other)  Continue to go to the gym twice a week  Keep working on saying \"no\"  Follow-up on job application  Work on self-care and doing things alone if there is no-one to do it with  Try to  extra shifts at work    Judd Clemons [facility]                                                         ______________________________________________________________________    Individual Treatment Plan    Patient's Name: Sofi Escobedo  YOB: 1995    Date of Creation: 4/26/23  Date Treatment Plan Last Reviewed/Revised: 4/26/23    DSM5 Diagnoses: Adjustment Disorders  309.0 (F43.21) With depressed mood  Psychosocial / Contextual Factors: Pt recently moved to [place] from [place] and has been dealing with the culture shift. Pt has also been working on trying to get himself established here which has been challenging.  PROMIS (reviewed every 90 days):   PROMIS-10 Scores        4/13/2023     6:31 AM   PROMIS-10 Total Score w/o Sub Scores   PROMIS TOTAL - SUBSCORES 25       Referral / Collaboration:  Referral to another professional/service is not indicated at this time..    Anticipated number of session for this episode of care: 9-12 sessions  Anticipation frequency of session: Weekly  Anticipated Duration of each session: 38-52 " minutes  Treatment plan will be reviewed in 90 days or when goals have been changed.       MeasurableTreatment Goal(s) related to diagnosis / functional impairment(s)  Goal 1: Patient will better manage his anger/frustration, not getting as easily aggravated, and not letting comments get to him as easily.    I will know I've met my goal when I am able to not let others get to me so much.      Objective #A (Patient Action)    Patient will identify triggers, thoughts, and current stressors which contribute to feelings of anxiety  identify patterns of escalation  (i.e. tightness in chest, flushed face, increased heart rate, clenched hands, etc.)  identify at least 3 techniques for intervening on the escalation  use cognitive strategies identified in therapy to challenge anxious thoughts  Increase interest, engagement, and pleasure in doing things  Decrease frequency and intensity of feeling down, depressed, hopeless  Identify negative self-talk and behaviors: challenge core beliefs, myths, and actions  Improve concentration, focus, and mindfulness in daily activities   identify 3 coping strategies for improving mood  learn at least 3 self-regulation strategies.  Status: New - Date: 4/26/23     Intervention(s)  Therapist will assign homework related to target objective with the intent to promote pt autonomy and better manage MH.  Facilitate increase in self-awareness  Provide psycho-education on emotion identification/regulation and coping skills  Teach/promote utilization of mindfulness skills and grounding    Goal 2: Patient will feel worthy and be able to find validation internally.     I will know I've met my goal when able to feel more complete or worthy without needing it externally.      Objective #A (Patient Action)    Status: New - Date: 4/26/23     Patient will identify and compliment positives at least 3 times daily to support positive self-image  Decrease frequency and intensity of feeling down, depressed,  hopeless  Identify negative self-talk and behaviors: challenge core beliefs, myths, and actions  identify 5 traits or charcteristics you like about yourself  identify at least 3 self-care activities.    Intervention(s)  Therapist will assign homework related to target objective with the intent to promote pt autonomy and better manage MH.  Facilitate increase in self-awareness  Provide psycho-education on self-care/compassion and narrative therapy interventions  Teach/promote utilization of reframing and boundary setting    Objective #B Being more authentic with myself and others through improving my self-esteem.   Patient will participate in social activities to improve mood  learn & utilize at least 3 assertive communication skills weekly  identify 5 traits or charcteristics you like about yourself  identify at least 3 adaptive coping statements to counteract negative thoughts.    Status: New - Date: 4/26/23     Intervention(s)  Therapist will assign homework related to target objective with the intent to promote pt autonomy and better manage MH.  Facilitate increase in self-awareness  Provide psycho-education on self-esteem building and self-exploration  Teach/promote utilization of positive self-affirmations and critical observation skills    Patient has reviewed and agreed to the above plan.      Judd Clemons, GILBERTC  April 26, 2023

## 2023-07-18 ENCOUNTER — VIRTUAL VISIT (OUTPATIENT)
Dept: PSYCHOLOGY | Facility: CLINIC | Age: 28
End: 2023-07-18
Payer: COMMERCIAL

## 2023-07-18 DIAGNOSIS — F43.21 ADJUSTMENT DISORDER WITH DEPRESSED MOOD: Primary | ICD-10-CM

## 2023-07-18 PROCEDURE — 90834 PSYTX W PT 45 MINUTES: CPT | Mod: VID | Performed by: COUNSELOR

## 2023-07-18 NOTE — PROGRESS NOTES
[facility]                                     Progress Note    Patient Name: Sofi Escobedo  Date: 7/18/23         Service Type: Individual      Session Start Time: 11:02am  Session End Time: 11:54am     Session Length: 52 minutes    Session #: 8    Attendees: Client attended alone    Service Modality:  Video Visit:      Provider verified identity through the following two step process.  Patient provided:  Patient is known previously to provider    Telemedicine Visit: The patient's condition can be safely assessed and treated via synchronous audio and visual telemedicine encounter.      Reason for Telemedicine Visit: Services only offered telehealth    Originating Site (Patient Location): Patient's home    Distant Site (Provider Location): Provider Remote Setting- Home Office    Consent:  The patient/guardian has verbally consented to: the potential risks and benefits of telemedicine (video visit) versus in person care; bill my insurance or make self-payment for services provided; and responsibility for payment of non-covered services.     Patient would like the video invitation sent by:  My Chart    Mode of Communication:  Video Conference via [employer]    Distant Location (Provider):  On-site    As the provider I attest to compliance with applicable laws and regulations related to telemedicine.    DATA  Interactive Complexity: No  Crisis: No  Extended Session (53+ minutes): No     Progress Since Last Session (Related to Symptoms / Goals / Homework):   Symptoms: Worsening symptoms pt is still feeling very isolated     Homework: Achieved / completed to satisfaction      Episode of Care Goals: Satisfactory progress - PREPARATION (Decided to change - considering how); Intervened by negotiating a change plan and determining options / strategies for behavior change, identifying triggers, exploring social supports, and working towards setting a date to begin behavior change     Current /  Ongoing Stressors and Concerns:   Pt is currently seeking therapy due to ongoing anxiety/depression and to better manage his temper. 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 Pt acknowledged that he is unsure he wanted the job, though is now worried about having a bad manager whom he feels lacks practical experience in the role that they will manage. Furthermore, pt endorsed feeling more depressive symptoms recently, most notably having more days where he struggles to get out of bed and feels down due to financial concerns. After reflecting on it pt noticed that a big part of his recent feelings is driven by thoughts that he is not getting anywhere or that what he is doing is pointless. Pt did state that despite how he has felt he has been able to go out by himself and has some ideas of things he can do with others or by himself that he wants to try. Pt also feels internally conflicted about wanting to both be in and not in a relationship with someone. Pt recognizes that a big part of this is feelings of loneliness and lack of establishment in [place] since moving. Furthermore, pt expressed that it doesn't seem he has a support network either, as the people he knows he feels are superficial, stuck in the past, or just want things from him. Strategies to get more established or feel like there was purpose to his everyday activities were review. Pt was able to identify some goals he would like to work towards as well to help him feel like he has something worthwhile to build towards.     Personal Goals:  Good Credit   license  House  Be with a partner that is invested in pt and is financially independent     Treatment Objective(s) Addressed in This Session:   identify and compliment positives at least 3 times daily to support positive self-image  identify triggers, thoughts, and current stressors which contribute to feelings  of anxiety  identify patterns of escalation  (i.e. tightness in chest, flushed face, increased heart rate, clenched hands, etc.)  identify at least 3 techniques for intervening on the escalation  use cognitive strategies identified in therapy to challenge anxious thoughts  Decrease frequency and intensity of feeling down, depressed, hopeless  Identify negative self-talk and behaviors: challenge core beliefs, myths, and actions  identify two areas of life that you would like to have improved functioning  identify at least 3 self-care activities     Intervention:   CBT: Reviewed recently behavior patterns and reinforcing cycle  Motivational Interviewing    MI Intervention: Expressed Empathy/Understanding, Supported Autonomy, Collaboration, Evocation, Open-ended questions, Reflections: simple and complex, Change talk (evoked), and Reframe     Change Talk Expressed by the Patient: Ability to change Reasons to change Need to change Committment to change Activation Taking steps    Provider Response to Change Talk: E - Evoked more info from patient about behavior change, A - Affirmed patient's thoughts, decisions, or attempts at behavior change, R - Reflected patient's change talk, and S - Summarized patient's change talk statements    Psychodynamic: Processed through internal-experiences related to anger and frustration management  Solution Focused: Identified immediate areas of concern and potential barriers to success in relationships    Assessments completed prior to visit:  PHQ2:       6/19/2023     4:01 PM 6/13/2023     8:13 AM 3/7/2023    11:25 AM 3/7/2023    11:24 AM   PHQ-2 ( 1999 [employer])   Q1: Little interest or pleasure in doing things 0 1 1 1   Q2: Feeling down, depressed or hopeless 0 0 0 0   PHQ-2 Score 0 1 1 1   Q1: Little interest or pleasure in doing things Not at all Several days Several days Several days   Q2: Feeling down, depressed or hopeless Not at all Not at all Not at all Not at all   PHQ-2 Score 0  1 1 1     PHQ9:       4/12/2023     4:37 PM   PHQ-9 SCORE   PHQ-9 Total Score MyChart 5 (Mild depression)   PHQ-9 Total Score 5     GAD2:       4/12/2023     4:53 PM 7/18/2023     8:25 AM   BISHOP-2   Feeling nervous, anxious, or on edge 1 0    0   Not being able to stop or control worrying 1 0    0   BISHOP-2 Total Score 2 0    0        ASSESSMENT: Current Emotional / Mental Status (status of significant symptoms):   Risk status (Self / Other harm or suicidal ideation)   Patient denies current fears or concerns for personal safety.   Patient denies current or recent suicidal ideation or behaviors.   Patient denies current or recent homicidal ideation or behaviors.   Patient denies current or recent self injurious behavior or ideation.   Patient denies other safety concerns.   Patient reports there has been no change in risk factors since their last session.     Patient reports there has been no change in protective factors since their last session.     Recommended that patient call 911 or go to the local ED should there be a change in any of these risk factors.     Appearance:   Appropriate    Eye Contact:   Good    Psychomotor Behavior: Normal    Attitude:   Cooperative  Interested Friendly Pleasant   Orientation:   All   Speech    Rate / Production: Normal/ Responsive Talkative    Volume:  Normal    Mood:    Anxious  Normal frustrated   Affect:    Appropriate    Thought Content:  Clear    Thought Form:  Coherent  Logical    Insight:    Fair  and Intellectual Insight     Medication Review:   No current psychiatric medications prescribed     Medication Compliance:   NA     Changes in Health Issues:   None reported     Chemical Use Review:   Substance Use: Chemical use reviewed, no active concerns identified      Tobacco Use: No change in amount of tobacco use since last session.  Provided encouragement to quit   Patient declined discussion at this time    Diagnosis:  1. Adjustment disorder with depressed mood         Collateral Reports Completed:   Not Applicable    PLAN: (Patient Tasks / Therapist Tasks / Other)  Continue to go to the gym twice a week  Work on self-care and doing things alone if there is no-one to do it with  Go out to lunch with friend  Possibly go to [employer] (extra credit)  Return things to ex-gf  Try cooking something more complicated or new (you can eat whatever ou want but you have to make it yourself idea)  Try to get involved in the community somehow (extra credit)    Judd Clemons, [facility]                                                         ______________________________________________________________________    Individual Treatment Plan    Patient's Name: Sofi Escobedo  YOB: 1995    Date of Creation: 4/26/23  Date Treatment Plan Last Reviewed/Revised: 4/26/23    DSM5 Diagnoses: Adjustment Disorders  309.0 (F43.21) With depressed mood  Psychosocial / Contextual Factors: Pt recently moved to [place] from [place] and has been dealing with the culture shift. Pt has also been working on trying to get himself established here which has been challenging.  PROMIS (reviewed every 90 days):   PROMIS-10 Scores        4/13/2023     6:31 AM 7/18/2023     8:26 AM   PROMIS-10 Total Score w/o Sub Scores   PROMIS TOTAL - SUBSCORES 25 33    33       Referral / Collaboration:  Referral to another professional/service is not indicated at this time..    Anticipated number of session for this episode of care: 9-12 sessions  Anticipation frequency of session: Weekly  Anticipated Duration of each session: 38-52 minutes  Treatment plan will be reviewed in 90 days or when goals have been changed.       MeasurableTreatment Goal(s) related to diagnosis / functional impairment(s)  Goal 1: Patient will better manage his anger/frustration, not getting as easily aggravated, and not letting comments get to him as easily.    I will know I've met my goal when I am able to not let others get to me  so much.      Objective #A (Patient Action)    Patient will identify triggers, thoughts, and current stressors which contribute to feelings of anxiety  identify patterns of escalation  (i.e. tightness in chest, flushed face, increased heart rate, clenched hands, etc.)  identify at least 3 techniques for intervening on the escalation  use cognitive strategies identified in therapy to challenge anxious thoughts  Increase interest, engagement, and pleasure in doing things  Decrease frequency and intensity of feeling down, depressed, hopeless  Identify negative self-talk and behaviors: challenge core beliefs, myths, and actions  Improve concentration, focus, and mindfulness in daily activities   identify 3 coping strategies for improving mood  learn at least 3 self-regulation strategies.  Status: New - Date: 4/26/23      Intervention(s)  Therapist will assign homework related to target objective with the intent to promote pt autonomy and better manage MH.  Facilitate increase in self-awareness  Provide psycho-education on emotion identification/regulation and coping skills  Teach/promote utilization of mindfulness skills and grounding    Goal 2: Patient will feel worthy and be able to find validation internally.     I will know I've met my goal when able to feel more complete or worthy without needing it externally.      Objective #A (Patient Action)    Status: New - Date: 4/26/23      Patient will identify and compliment positives at least 3 times daily to support positive self-image  Decrease frequency and intensity of feeling down, depressed, hopeless  Identify negative self-talk and behaviors: challenge core beliefs, myths, and actions  identify 5 traits or charcteristics you like about yourself  identify at least 3 self-care activities.    Intervention(s)  Therapist will assign homework related to target objective with the intent to promote pt autonomy and better manage MH.  Facilitate increase in  self-awareness  Provide psycho-education on self-care/compassion and narrative therapy interventions  Teach/promote utilization of reframing and boundary setting    Objective #B Being more authentic with myself and others through improving my self-esteem.   Patient will participate in social activities to improve mood  learn & utilize at least 3 assertive communication skills weekly  identify 5 traits or charcteristics you like about yourself  identify at least 3 adaptive coping statements to counteract negative thoughts.    Status: New - Date: 4/26/23      Intervention(s)  Therapist will assign homework related to target objective with the intent to promote pt autonomy and better manage MH.  Facilitate increase in self-awareness  Provide psycho-education on self-esteem building and self-exploration  Teach/promote utilization of positive self-affirmations and critical observation skills    Patient has reviewed and agreed to the above plan.      Judd Clemons, [facility]  April 26, 2023

## 2023-08-01 ENCOUNTER — VIRTUAL VISIT (OUTPATIENT)
Dept: PSYCHOLOGY | Facility: CLINIC | Age: 28
End: 2023-08-01
Payer: COMMERCIAL

## 2023-08-01 DIAGNOSIS — F43.21 ADJUSTMENT DISORDER WITH DEPRESSED MOOD: Primary | ICD-10-CM

## 2023-08-01 PROCEDURE — 90837 PSYTX W PT 60 MINUTES: CPT | Mod: VID | Performed by: COUNSELOR

## 2023-08-01 NOTE — PROGRESS NOTES
[facility]                                     Progress Note    Patient Name: Sofi Escobedo  Date: 8/01/23         Service Type: Individual      Session Start Time: 11:01 am  Session End Time: 11:55 am     Session Length: 54 minutes    Session #: 9    Attendees: Client attended alone    Service Modality:  Video Visit:      Provider verified identity through the following two step process.  Patient provided:  Patient is known previously to provider    Telemedicine Visit: The patient's condition can be safely assessed and treated via synchronous audio and visual telemedicine encounter.      Reason for Telemedicine Visit: Services only offered telehealth    Originating Site (Patient Location): Patient's home    Distant Site (Provider Location): Provider Remote Setting- Home Office    Consent:  The patient/guardian has verbally consented to: the potential risks and benefits of telemedicine (video visit) versus in person care; bill my insurance or make self-payment for services provided; and responsibility for payment of non-covered services.     Patient would like the video invitation sent by:  My Chart    Mode of Communication:  [facility]    Distant Location (Provider):  On-site    As the provider I attest to compliance with applicable laws and regulations related to telemedicine.    DATA  Interactive Complexity: No  Crisis: No  Extended Session (53+ minutes): PROLONGED SERVICE IN THE OUTPATIENT SETTING REQUIRING DIRECT (FACE-TO-FACE) PATIENT CONTACT BEYOND THE USUAL SERVICE:    - Patient's presenting concerns require more intensive intervention than could be completed within the usual service     Progress Since Last Session (Related to Symptoms / Goals / Homework):   Symptoms: Improving insight into symptoms    Homework: Achieved / completed to satisfaction      Episode of Care Goals: Satisfactory progress - ACTION (Actively working towards change); Intervened by  reinforcing change plan / affirming steps taken     Current / Ongoing Stressors and Concerns:   Pt is currently seeking therapy due to ongoing anxiety/depression and to better manage his temper. Since last session, pt reported that things have been up and down, with him finding that he is focusing more on himself to meet his own needs. Pt identified that he is isolating a bit, though feels that right now that has been helpful in letting him prioritize himself, and yet he is responding to others if they reach out to him. Pt processed through how he has been handling/navigating his female relationships, expressing frustration with receiving mixed messages. Pt did end up drinking excessively after dealing with it, finding that combining sake and tequila was a bad idea. During this, pt reported that he drunk texted his ex's which he isn't sure why he did, and since then it has all smoothed out. However, from that experience pt came to recognize that he needs to focus on himself as he needs to be right with himself before he can be right with anyone else. Pt has been spending time on his own and recently found he is enjoying his own company more. Pt also engaged in some serious introspection recently, which provided him some real insight into how he hasn't ever allowed himself to make a place to be a home. Pr reflected on this and verbalized how he has never really felt like he has a home, and that he has never made a space his own. Furthermore, pt realized that he tends to be driven by people pleasing and that finding friends that don't feed into this, would be a good sign. Pt discussed how he is also coming to recognize that a lot of his current issues stem from how he was raised by his parents and how he was never truly given a supportive environment where he felt loved. Pt recently spoke with his father, who kind of apologized for what happened when pt was growing up, though pt felt it was more about seeking  forgiveness then actually regretting what they had done. Pt is slowly starting to feel more comfortable focusing on himself and being with himself, acknowledging that he is actually a pretty cool/good urban to himself on his own. Session took longer then anticipated due to the presenting concerns that pt wanted to address.    Personal Goals:  Good Credit   license  House  Be with a partner that is invested in pt and is financially independent     Treatment Objective(s) Addressed in This Session:   identify and compliment positives at least 3 times daily to support positive self-image  identify triggers, thoughts, and current stressors which contribute to feelings of anxiety  identify patterns of escalation  (i.e. tightness in chest, flushed face, increased heart rate, clenched hands, etc.)  identify at least 3 techniques for intervening on the escalation  use cognitive strategies identified in therapy to challenge anxious thoughts  Decrease frequency and intensity of feeling down, depressed, hopeless  Identify negative self-talk and behaviors: challenge core beliefs, myths, and actions  identify two areas of life that you would like to have improved functioning  identify at least 3 self-care activities     Intervention:   CBT: Reviewed recently behavior patterns and reinforcing cycle  Motivational Interviewing    MI Intervention: Expressed Empathy/Understanding, Supported Autonomy, Collaboration, Evocation, Open-ended questions, Reflections: simple and complex, Change talk (evoked), and Reframe     Change Talk Expressed by the Patient: Ability to change Reasons to change Need to change Committment to change Activation Taking steps    Provider Response to Change Talk: E - Evoked more info from patient about behavior change, A - Affirmed patient's thoughts, decisions, or attempts at behavior change, R - Reflected patient's change talk, and S - Summarized patient's change talk statements    Psychodynamic:  Processed through internal-experiences related to anger and frustration management  Solution Focused: Identified immediate areas of concern and potential barriers to success in relationships    Assessments completed prior to visit:  PHQ2:       6/19/2023     4:01 PM 6/13/2023     8:13 AM 3/7/2023    11:25 AM 3/7/2023    11:24 AM   PHQ-2 ( 1999 [employer])   Q1: Little interest or pleasure in doing things 0 1 1 1   Q2: Feeling down, depressed or hopeless 0 0 0 0   PHQ-2 Score 0 1 1 1   Q1: Little interest or pleasure in doing things Not at all Several days Several days Several days   Q2: Feeling down, depressed or hopeless Not at all Not at all Not at all Not at all   PHQ-2 Score 0 1 1 1     PHQ9:       4/12/2023     4:37 PM   PHQ-9 SCORE   PHQ-9 Total Score MyChart 5 (Mild depression)   PHQ-9 Total Score 5     GAD2:       4/12/2023     4:53 PM 7/18/2023     8:25 AM   BISHOP-2   Feeling nervous, anxious, or on edge 1 0    0   Not being able to stop or control worrying 1 0    0   BISHOP-2 Total Score 2 0    0        ASSESSMENT: Current Emotional / Mental Status (status of significant symptoms):   Risk status (Self / Other harm or suicidal ideation)   Patient denies current fears or concerns for personal safety.   Patient denies current or recent suicidal ideation or behaviors.   Patient denies current or recent homicidal ideation or behaviors.   Patient denies current or recent self injurious behavior or ideation.   Patient denies other safety concerns.   Patient reports there has been no change in risk factors since their last session.     Patient reports there has been no change in protective factors since their last session.     Recommended that patient call 911 or go to the local ED should there be a change in any of these risk factors.     Appearance:   Appropriate    Eye Contact:   Good    Psychomotor Behavior: Normal    Attitude:   Cooperative  Interested Friendly Pleasant   Orientation:   All   Speech    Rate /  Production: Normal/ Responsive Talkative    Volume:  Normal    Mood:    Anxious  Normal frustrated   Affect:    Appropriate    Thought Content:  Clear    Thought Form:  Coherent  Logical    Insight:    Good  and Intellectual Insight     Medication Review:   No current psychiatric medications prescribed     Medication Compliance:   NA     Changes in Health Issues:   None reported     Chemical Use Review:   Substance Use: Chemical use reviewed, no active concerns identified      Tobacco Use: No change in amount of tobacco use since last session.  Provided encouragement to quit   Patient declined discussion at this time    Diagnosis:  1. Adjustment disorder with depressed mood      Collateral Reports Completed:   Not Applicable    PLAN: (Patient Tasks / Therapist Tasks / Other)  Continue to go to the gym twice a week  Work on self-care and doing things alone if there is no-one to do it with  Go out to lunch with friend  Possibly go to [employer] (extra credit)  Return things to ex-gf  Try cooking something more complicated or new (you can eat whatever ou want but you have to make it yourself idea)  Try to get involved in the community somehow (extra credit)    Judd ClemonsPsychiatric                                                         ______________________________________________________________________    Individual Treatment Plan    Patient's Name: Sofi Escobedo  YOB: 1995    Date of Creation: 4/26/23  Date Treatment Plan Last Reviewed/Revised: 8/1/23    DSM5 Diagnoses: Adjustment Disorders  309.0 (F43.21) With depressed mood  Psychosocial / Contextual Factors: Pt recently moved to [place] from [place] and has been dealing with the culture shift. Pt has also been working on trying to get himself established here which has been challenging.  PROMIS (reviewed every 90 days):   PROMIS-10 Scores        4/13/2023     6:31 AM 7/18/2023     8:26 AM   PROMIS-10 Total Score w/o Sub Scores    PROMIS TOTAL - SUBSCORES 25 33    33       Referral / Collaboration:  Referral to another professional/service is not indicated at this time..    Anticipated number of session for this episode of care: 9-12 sessions  Anticipation frequency of session: Weekly  Anticipated Duration of each session: 38-52 minutes  Treatment plan will be reviewed in 90 days or when goals have been changed.       MeasurableTreatment Goal(s) related to diagnosis / functional impairment(s)  Goal 1: Patient will better manage his anger/frustration, not getting as easily aggravated, and not letting comments get to him as easily.    I will know I've met my goal when I am able to not let others get to me so much.      Objective #A (Patient Action)    Patient will identify triggers, thoughts, and current stressors which contribute to feelings of anxiety  identify patterns of escalation  (i.e. tightness in chest, flushed face, increased heart rate, clenched hands, etc.)  identify at least 3 techniques for intervening on the escalation  use cognitive strategies identified in therapy to challenge anxious thoughts  Increase interest, engagement, and pleasure in doing things  Decrease frequency and intensity of feeling down, depressed, hopeless  Identify negative self-talk and behaviors: challenge core beliefs, myths, and actions  Improve concentration, focus, and mindfulness in daily activities   identify 3 coping strategies for improving mood  learn at least 3 self-regulation strategies.  Status: Continued - Date(s): 8/1/23     Intervention(s)  Therapist will assign homework related to target objective with the intent to promote pt autonomy and better manage MH.  Facilitate increase in self-awareness  Provide psycho-education on emotion identification/regulation and coping skills  Teach/promote utilization of mindfulness skills and grounding    Goal 2: Patient will feel worthy and be able to find validation internally.     I will know I've met  my goal when able to feel more complete or worthy without needing it externally.      Objective #A (Patient Action)    Status: Continued - Date(s): 8/1/23     Patient will identify and compliment positives at least 3 times daily to support positive self-image  Decrease frequency and intensity of feeling down, depressed, hopeless  Identify negative self-talk and behaviors: challenge core beliefs, myths, and actions  identify 5 traits or charcteristics you like about yourself  identify at least 3 self-care activities.    Intervention(s)  Therapist will assign homework related to target objective with the intent to promote pt autonomy and better manage MH.  Facilitate increase in self-awareness  Provide psycho-education on self-care/compassion and narrative therapy interventions  Teach/promote utilization of reframing and boundary setting    Objective #B Being more authentic with myself and others through improving my self-esteem.   Patient will participate in social activities to improve mood  learn & utilize at least 3 assertive communication skills weekly  identify 5 traits or charcteristics you like about yourself  identify at least 3 adaptive coping statements to counteract negative thoughts.    Status: Continued - Date(s): 8/1/23     Intervention(s)  Therapist will assign homework related to target objective with the intent to promote pt autonomy and better manage MH.  Facilitate increase in self-awareness  Provide psycho-education on self-esteem building and self-exploration  Teach/promote utilization of positive self-affirmations and critical observation skills    Patient has reviewed and agreed to the above plan.      Judd Clemons, LADONNA  April 26, 2023

## 2023-08-08 ENCOUNTER — VIRTUAL VISIT (OUTPATIENT)
Dept: PSYCHOLOGY | Facility: CLINIC | Age: 28
End: 2023-08-08
Payer: COMMERCIAL

## 2023-08-08 DIAGNOSIS — F43.21 ADJUSTMENT DISORDER WITH DEPRESSED MOOD: Primary | ICD-10-CM

## 2023-08-08 PROCEDURE — 90834 PSYTX W PT 45 MINUTES: CPT | Mod: VID | Performed by: COUNSELOR

## 2023-08-08 NOTE — PROGRESS NOTES
[facility]                                     Progress Note    Patient Name: Sofi Escobedo  Date: 8/08/23         Service Type: Individual      Session Start Time: 11:00 am  Session End Time: 11:52 am     Session Length: 52 minutes    Session #: 10    Attendees: Client attended alone    Service Modality:  Video Visit:      Provider verified identity through the following two step process.  Patient provided:  Patient is known previously to provider    Telemedicine Visit: The patient's condition can be safely assessed and treated via synchronous audio and visual telemedicine encounter.      Reason for Telemedicine Visit: Services only offered telehealth    Originating Site (Patient Location): Patient's home    Distant Site (Provider Location): Provider Remote Setting- Home Office    Consent:  The patient/guardian has verbally consented to: the potential risks and benefits of telemedicine (video visit) versus in person care; bill my insurance or make self-payment for services provided; and responsibility for payment of non-covered services.     Patient would like the video invitation sent by:  My Chart    Mode of Communication:  [facility]    Distant Location (Provider):  On-site    As the provider I attest to compliance with applicable laws and regulations related to telemedicine.    DATA  Interactive Complexity: No  Crisis: No  Extended Session (53+ minutes): No     Progress Since Last Session (Related to Symptoms / Goals / Homework):   Symptoms: Improving insight into symptoms    Homework: Partially completed      Episode of Care Goals: Satisfactory progress - ACTION (Actively working towards change); Intervened by reinforcing change plan / affirming steps taken     Current / Ongoing Stressors and Concerns:   Pt is currently seeking therapy due to ongoing anxiety/depression and to better manage his temper. Since last session, pt reported that he continues to feel  pretty non-challant and low-pressure about stuff. Pt has decided that he is going to visit family in [facility] this September and that is his second trip for the year. Pt has been connecting with male co-workers more so which has felt pretty cool tool. Pt discussed how he has noticed that he has been feeling a bit lower energy, though overall his mood has been good. Pt has spent time with his female friend, which went well, and he identified he would like to have a relationship with still though continues to get mixed signals on if she wants that too. Pt processed through how his experiences with his mom growing up, feeling abandoned, and that he has internalized the sense of not being good enough for her then he won't be good enough for anyone else. Furthermore, after talking through it more pt recognized that he hasn't ever had a secure attachment to someone and now feels like he can't allow himself to get close to someone due to fears they will abandon him too. After talking about his situation with the female friend, in relation to this attachment issues, pt agreed that if he just wants to ride it out and try to make a secure connection he needs to focus on just one relationship at a time. Pt set himself a time table to be more direct about addressing where they stand and is willing to give it a couple months to see where things go.     Personal Goals:  Good Credit   license  House  Be with a partner that is invested in pt and is financially independent     Treatment Objective(s) Addressed in This Session:   identify and compliment positives at least 3 times daily to support positive self-image  identify triggers, thoughts, and current stressors which contribute to feelings of anxiety  identify patterns of escalation  (i.e. tightness in chest, flushed face, increased heart rate, clenched hands, etc.)  identify at least 3 techniques for intervening on the escalation  use cognitive strategies  identified in therapy to challenge anxious thoughts  Decrease frequency and intensity of feeling down, depressed, hopeless  Identify negative self-talk and behaviors: challenge core beliefs, myths, and actions  identify two areas of life that you would like to have improved functioning  identify at least 3 self-care activities     Intervention:   CBT: Reviewed recently behavior patterns and reinforcing cycle  Motivational Interviewing    MI Intervention: Expressed Empathy/Understanding, Supported Autonomy, Collaboration, Evocation, Open-ended questions, Reflections: simple and complex, Change talk (evoked), and Reframe     Change Talk Expressed by the Patient: Ability to change Reasons to change Need to change Committment to change Activation Taking steps    Provider Response to Change Talk: E - Evoked more info from patient about behavior change, A - Affirmed patient's thoughts, decisions, or attempts at behavior change, R - Reflected patient's change talk, and S - Summarized patient's change talk statements    Psychodynamic: Processed through internal-experiences related to anger and frustration management  Solution Focused: Identified immediate areas of concern and potential barriers to success in relationships    Assessments completed prior to visit:  PHQ2:       6/19/2023     4:01 PM 6/13/2023     8:13 AM 3/7/2023    11:25 AM 3/7/2023    11:24 AM   PHQ-2 ( 1999 [employer])   Q1: Little interest or pleasure in doing things 0 1 1 1   Q2: Feeling down, depressed or hopeless 0 0 0 0   PHQ-2 Score 0 1 1 1   Q1: Little interest or pleasure in doing things Not at all Several days Several days Several days   Q2: Feeling down, depressed or hopeless Not at all Not at all Not at all Not at all   PHQ-2 Score 0 1 1 1     PHQ9:       4/12/2023     4:37 PM   PHQ-9 SCORE   PHQ-9 Total Score MyChart 5 (Mild depression)   PHQ-9 Total Score 5     GAD2:       4/12/2023     4:53 PM 7/18/2023     8:25 AM   BISHOP-2   Feeling nervous,  anxious, or on edge 1 0    0   Not being able to stop or control worrying 1 0    0   BISHOP-2 Total Score 2 0    0        ASSESSMENT: Current Emotional / Mental Status (status of significant symptoms):   Risk status (Self / Other harm or suicidal ideation)   Patient denies current fears or concerns for personal safety.   Patient denies current or recent suicidal ideation or behaviors.   Patient denies current or recent homicidal ideation or behaviors.   Patient denies current or recent self injurious behavior or ideation.   Patient denies other safety concerns.   Patient reports there has been no change in risk factors since their last session.     Patient reports there has been no change in protective factors since their last session.     Recommended that patient call 911 or go to the local ED should there be a change in any of these risk factors.     Appearance:   Appropriate    Eye Contact:   Good    Psychomotor Behavior: Normal    Attitude:   Cooperative  Interested Friendly Pleasant   Orientation:   All   Speech    Rate / Production: Normal/ Responsive    Volume:  Normal    Mood:    Anxious  Normal   Affect:    Appropriate    Thought Content:  Clear    Thought Form:  Coherent  Logical    Insight:    Good  and Intellectual Insight     Medication Review:   No current psychiatric medications prescribed     Medication Compliance:   NA     Changes in Health Issues:   None reported     Chemical Use Review:   Substance Use: Chemical use reviewed, no active concerns identified      Tobacco Use: No change in amount of tobacco use since last session.  Provided encouragement to quit   Patient declined discussion at this time    Diagnosis:  1. Adjustment disorder with depressed mood        Collateral Reports Completed:   Not Applicable    PLAN: (Patient Tasks / Therapist Tasks / Other)  Possibly go to [email or web address] (extra credit)  Try to get involved in the community somehow (extra credit)  Try cooking something more  complicated or new (you can eat whatever ou want but you have to make it yourself idea)  Focus on one relationship at a time  Connect with male co-workers     Judd Clemons [facility]                                                         ______________________________________________________________________    Individual Treatment Plan    Patient's Name: Sofi Escobedo  YOB: 1995    Date of Creation: 4/26/23  Date Treatment Plan Last Reviewed/Revised: 8/1/23    DSM5 Diagnoses: Adjustment Disorders  309.0 (F43.21) With depressed mood  Psychosocial / Contextual Factors: Pt recently moved to [place] from [place] and has been dealing with the culture shift. Pt has also been working on trying to get himself established here which has been challenging.  PROMIS (reviewed every 90 days):   PROMIS-10 Scores        4/13/2023     6:31 AM 7/18/2023     8:26 AM   PROMIS-10 Total Score w/o Sub Scores   PROMIS TOTAL - SUBSCORES 25 33    33       Referral / Collaboration:  Referral to another professional/service is not indicated at this time..    Anticipated number of session for this episode of care: 9-12 sessions  Anticipation frequency of session: Weekly  Anticipated Duration of each session: 38-52 minutes  Treatment plan will be reviewed in 90 days or when goals have been changed.       MeasurableTreatment Goal(s) related to diagnosis / functional impairment(s)  Goal 1: Patient will better manage his anger/frustration, not getting as easily aggravated, and not letting comments get to him as easily.    I will know I've met my goal when I am able to not let others get to me so much.      Objective #A (Patient Action)    Patient will identify triggers, thoughts, and current stressors which contribute to feelings of anxiety  identify patterns of escalation  (i.e. tightness in chest, flushed face, increased heart rate, clenched hands, etc.)  identify at least 3 techniques for intervening on the escalation  use  cognitive strategies identified in therapy to challenge anxious thoughts  Increase interest, engagement, and pleasure in doing things  Decrease frequency and intensity of feeling down, depressed, hopeless  Identify negative self-talk and behaviors: challenge core beliefs, myths, and actions  Improve concentration, focus, and mindfulness in daily activities   identify 3 coping strategies for improving mood  learn at least 3 self-regulation strategies.  Status: Continued - Date(s): 8/1/23     Intervention(s)  Therapist will assign homework related to target objective with the intent to promote pt autonomy and better manage MH.  Facilitate increase in self-awareness  Provide psycho-education on emotion identification/regulation and coping skills  Teach/promote utilization of mindfulness skills and grounding    Goal 2: Patient will feel worthy and be able to find validation internally.     I will know I've met my goal when able to feel more complete or worthy without needing it externally.      Objective #A (Patient Action)    Status: Continued - Date(s): 8/1/23     Patient will identify and compliment positives at least 3 times daily to support positive self-image  Decrease frequency and intensity of feeling down, depressed, hopeless  Identify negative self-talk and behaviors: challenge core beliefs, myths, and actions  identify 5 traits or charcteristics you like about yourself  identify at least 3 self-care activities.    Intervention(s)  Therapist will assign homework related to target objective with the intent to promote pt autonomy and better manage MH.  Facilitate increase in self-awareness  Provide psycho-education on self-care/compassion and narrative therapy interventions  Teach/promote utilization of reframing and boundary setting    Objective #B Being more authentic with myself and others through improving my self-esteem.   Patient will participate in social activities to improve mood  learn & utilize at  least 3 assertive communication skills weekly  identify 5 traits or charcteristics you like about yourself  identify at least 3 adaptive coping statements to counteract negative thoughts.    Status: Continued - Date(s): 8/1/23     Intervention(s)  Therapist will assign homework related to target objective with the intent to promote pt autonomy and better manage MH.  Facilitate increase in self-awareness  Provide psycho-education on self-esteem building and self-exploration  Teach/promote utilization of positive self-affirmations and critical observation skills    Patient has reviewed and agreed to the above plan.      Judd Clemons, [facility]  April 26, 2023

## 2023-08-22 ENCOUNTER — VIRTUAL VISIT (OUTPATIENT)
Dept: PSYCHOLOGY | Facility: CLINIC | Age: 28
End: 2023-08-22
Payer: COMMERCIAL

## 2023-08-22 DIAGNOSIS — F43.21 ADJUSTMENT DISORDER WITH DEPRESSED MOOD: Primary | ICD-10-CM

## 2023-08-22 PROCEDURE — 90834 PSYTX W PT 45 MINUTES: CPT | Mod: VID | Performed by: COUNSELOR

## 2023-08-22 NOTE — PROGRESS NOTES
[facility]                                     Progress Note    Patient Name: Sofi Escobedo  Date: 8/22/23         Service Type: Individual      Session Start Time: 11:09 am  Session End Time: 12:00 pm     Session Length: 51 minutes    Session #: 11    Attendees: Client attended alone    Service Modality:  Video Visit:      Provider verified identity through the following two step process.  Patient provided:  Patient is known previously to provider    Telemedicine Visit: The patient's condition can be safely assessed and treated via synchronous audio and visual telemedicine encounter.      Reason for Telemedicine Visit: Services only offered telehealth    Originating Site (Patient Location): Patient's home    Distant Site (Provider Location): Provider Remote Setting- Home Office    Consent:  The patient/guardian has verbally consented to: the potential risks and benefits of telemedicine (video visit) versus in person care; bill my insurance or make self-payment for services provided; and responsibility for payment of non-covered services.     Patient would like the video invitation sent by:  My Chart    Mode of Communication:  Video Conference via [facility]    Distant Location (Provider):  Off-site    As the provider I attest to compliance with applicable laws and regulations related to telemedicine.    DATA  Interactive Complexity: No  Crisis: No  Extended Session (53+ minutes): No     Progress Since Last Session (Related to Symptoms / Goals / Homework):   Symptoms: Improving insight into symptoms    Homework: Achieved / completed to satisfaction      Episode of Care Goals: Satisfactory progress - ACTION (Actively working towards change); Intervened by reinforcing change plan / affirming steps taken     Current / Ongoing Stressors and Concerns:   Pt is currently seeking therapy due to ongoing anxiety/depression and to better manage his temper. Since last session, pt reported that things  have been about the same and that he noticed he has been getting out more. 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 Pt has been focused on letting these relationships at grow naturally and not force them, which has helped him take the pressure off himself. Pt reflected that since recognizing he can't control other's expectations and has stopped focusing on them he has been feeling a lot less anxious. Pt is attending a friend's daughters first birthday this week, which he is happy about. Pt briefly expressed worry related to [place] though [place] are fine for now so he is not to worried. Pt processed through how his relationship situations have been going. Pt reflected that he needs to find a relationship that fullfills his needs, though acknolwedges that he is stuck on certain people which makes him feel unavailable to others. Pt processed through how he financially feels strapped. Pt reflected that this causes him to question if he can afford to go to [employer] again this year and is uncertain why he feels so strapped when he knows he makes good money. Pt identified that he makes a lot of frivolous purchases due to their convenience (mostly eating out and ordering in) and doesn't stick to a budget, which he needs to work on. 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 Pt has been trying to focus on himself and reading less into how others respond to him. Pt has come to recognize that he needs to take people where they are at and that the way they respond to him may or may not have anything to do with him. Pt has been feeling better about making social connections in [place] and yet finds it challenging a lot of the time as [place]  [place] so culturally [place] at times.     Personal Goals:  Good Credit   license  House  Be with a partner that is invested in pt and is financially independent     Treatment Objective(s) Addressed in This Session:   identify and compliment positives at least 3 times daily to support positive self-image  identify triggers, thoughts, and current stressors which contribute to feelings of anxiety  identify patterns of escalation  (i.e. tightness in chest, flushed face, increased heart rate, clenched hands, etc.)  identify at least 3 techniques for intervening on the escalation  use cognitive strategies identified in therapy to challenge anxious thoughts  Decrease frequency and intensity of feeling down, depressed, hopeless  Identify negative self-talk and behaviors: challenge core beliefs, myths, and actions  identify two areas of life that you would like to have improved functioning  identify at least 3 self-care activities     Intervention:   CBT: Reviewed recently behavior patterns and reinforcing cycle  Motivational Interviewing    MI Intervention: Expressed Empathy/Understanding, Supported Autonomy, Collaboration, Evocation, Open-ended questions, Reflections: simple and complex, Change talk (evoked), and Reframe     Change Talk Expressed by the Patient: Ability to change Reasons to change Need to change Committment to change Activation Taking steps    Provider Response to Change Talk: E - Evoked more info from patient about behavior change, A - Affirmed patient's thoughts, decisions, or attempts at behavior change, R - Reflected patient's change talk, and S - Summarized patient's change talk statements    Psychodynamic: Processed through internal-experiences related to anger and frustration management  Solution Focused: Identified immediate areas of concern and potential barriers to success in relationships    Assessments completed prior to visit:  PHQ2:       6/19/2023     4:01 PM  6/13/2023     8:13 AM 3/7/2023    11:25 AM 3/7/2023    11:24 AM   PHQ-2 ( 1999 [employer])   Q1: Little interest or pleasure in doing things 0 1 1 1   Q2: Feeling down, depressed or hopeless 0 0 0 0   PHQ-2 Score 0 1 1 1   Q1: Little interest or pleasure in doing things Not at all Several days Several days Several days   Q2: Feeling down, depressed or hopeless Not at all Not at all Not at all Not at all   PHQ-2 Score 0 1 1 1     PHQ9:       4/12/2023     4:37 PM   PHQ-9 SCORE   PHQ-9 Total Score MyChart 5 (Mild depression)   PHQ-9 Total Score 5     GAD2:       4/12/2023     4:53 PM 7/18/2023     8:25 AM   BISHOP-2   Feeling nervous, anxious, or on edge 1 0    0   Not being able to stop or control worrying 1 0    0   BISHOP-2 Total Score 2 0    0        ASSESSMENT: Current Emotional / Mental Status (status of significant symptoms):   Risk status (Self / Other harm or suicidal ideation)   Patient denies current fears or concerns for personal safety.   Patient denies current or recent suicidal ideation or behaviors.   Patient denies current or recent homicidal ideation or behaviors.   Patient denies current or recent self injurious behavior or ideation.   Patient denies other safety concerns.   Patient reports there has been no change in risk factors since their last session.     Patient reports there has been no change in protective factors since their last session.     Recommended that patient call 911 or go to the local ED should there be a change in any of these risk factors.     Appearance:   Appropriate    Eye Contact:   Good    Psychomotor Behavior: Normal    Attitude:   Cooperative  Interested Friendly Pleasant   Orientation:   All   Speech    Rate / Production: Normal/ Responsive    Volume:  Normal    Mood:    Depressed  Normal   Affect:    Appropriate    Thought Content:  Clear    Thought Form:  Coherent  Logical    Insight:    Good  and Intellectual Insight     Medication Review:   No current psychiatric medications  prescribed     Medication Compliance:   NA     Changes in Health Issues:   None reported     Chemical Use Review:   Substance Use: Chemical use reviewed, no active concerns identified      Tobacco Use: No change in amount of tobacco use since last session.  Provided encouragement to quit   Patient declined discussion at this time    Diagnosis:  1. Adjustment disorder with depressed mood      Collateral Reports Completed:   Not Applicable    PLAN: (Patient Tasks / Therapist Tasks / Other)  Possibly go to [facility] [employer] (extra credit)  Reflect yourself is your heart in a helpful place  Remake budget  Door Dash at most once a day  Go to birthday party    Judd Clemons [facility]                                                         ______________________________________________________________________    Individual Treatment Plan    Patient's Name: Sofi Escobedo  YOB: 1995    Date of Creation: 4/26/23  Date Treatment Plan Last Reviewed/Revised: 8/1/23    DSM5 Diagnoses: Adjustment Disorders  309.0 (F43.21) With depressed mood  Psychosocial / Contextual Factors: Pt recently moved to [place] from [place] and has been dealing with the culture shift. Pt has also been working on trying to get himself established here which has been challenging.  PROMIS (reviewed every 90 days):   PROMIS-10 Scores        4/13/2023     6:31 AM 7/18/2023     8:26 AM   PROMIS-10 Total Score w/o Sub Scores   PROMIS TOTAL - SUBSCORES 25 33    33       Referral / Collaboration:  Referral to another professional/service is not indicated at this time..    Anticipated number of session for this episode of care: 9-12 sessions  Anticipation frequency of session: Weekly  Anticipated Duration of each session: 38-52 minutes  Treatment plan will be reviewed in 90 days or when goals have been changed.       MeasurableTreatment Goal(s) related to diagnosis / functional impairment(s)  Goal 1: Patient will better manage his  anger/frustration, not getting as easily aggravated, and not letting comments get to him as easily.    I will know I've met my goal when I am able to not let others get to me so much.      Objective #A (Patient Action)    Patient will identify triggers, thoughts, and current stressors which contribute to feelings of anxiety  identify patterns of escalation  (i.e. tightness in chest, flushed face, increased heart rate, clenched hands, etc.)  identify at least 3 techniques for intervening on the escalation  use cognitive strategies identified in therapy to challenge anxious thoughts  Increase interest, engagement, and pleasure in doing things  Decrease frequency and intensity of feeling down, depressed, hopeless  Identify negative self-talk and behaviors: challenge core beliefs, myths, and actions  Improve concentration, focus, and mindfulness in daily activities   identify 3 coping strategies for improving mood  learn at least 3 self-regulation strategies.  Status: Continued - Date(s): 8/1/23     Intervention(s)  Therapist will assign homework related to target objective with the intent to promote pt autonomy and better manage MH.  Facilitate increase in self-awareness  Provide psycho-education on emotion identification/regulation and coping skills  Teach/promote utilization of mindfulness skills and grounding    Goal 2: Patient will feel worthy and be able to find validation internally.     I will know I've met my goal when able to feel more complete or worthy without needing it externally.      Objective #A (Patient Action)    Status: Continued - Date(s): 8/1/23     Patient will identify and compliment positives at least 3 times daily to support positive self-image  Decrease frequency and intensity of feeling down, depressed, hopeless  Identify negative self-talk and behaviors: challenge core beliefs, myths, and actions  identify 5 traits or charcteristics you like about yourself  identify at least 3 self-care  activities.    Intervention(s)  Therapist will assign homework related to target objective with the intent to promote pt autonomy and better manage MH.  Facilitate increase in self-awareness  Provide psycho-education on self-care/compassion and narrative therapy interventions  Teach/promote utilization of reframing and boundary setting    Objective #B Being more authentic with myself and others through improving my self-esteem.   Patient will participate in social activities to improve mood  learn & utilize at least 3 assertive communication skills weekly  identify 5 traits or charcteristics you like about yourself  identify at least 3 adaptive coping statements to counteract negative thoughts.    Status: Continued - Date(s): 8/1/23     Intervention(s)  Therapist will assign homework related to target objective with the intent to promote pt autonomy and better manage MH.  Facilitate increase in self-awareness  Provide psycho-education on self-esteem building and self-exploration  Teach/promote utilization of positive self-affirmations and critical observation skills    Patient has reviewed and agreed to the above plan.      Judd Clemons, LADONNA  April 26, 2023

## 2023-08-29 ENCOUNTER — VIRTUAL VISIT (OUTPATIENT)
Dept: PSYCHOLOGY | Facility: CLINIC | Age: 28
End: 2023-08-29
Payer: COMMERCIAL

## 2023-08-29 DIAGNOSIS — F43.21 ADJUSTMENT DISORDER WITH DEPRESSED MOOD: Primary | ICD-10-CM

## 2023-08-29 NOTE — PROGRESS NOTES
Pt joined today's appointment virtual on time and expressed not being able to attend today appointment due to not feeling up for it today. Pt did request to schedule further follow-up and do a brief check-in. Pt reported that things have been mostly the same with no significant changes. Pt plans [place], though isn't sure he wants to go alone. Pt was encouraged to attend if he wanted to even if alone, which pt expressed appreciating the encouragement. Pt scheduled further follow-up for next month. Session was to short to place a charge.

## 2023-09-05 ENCOUNTER — DOCUMENTATION ONLY (OUTPATIENT)
Dept: BEHAVIORAL HEALTH | Facility: HOSPITAL | Age: 28
End: 2023-09-05

## 2023-09-05 NOTE — PROGRESS NOTES
Therapist (writer) entered the virtual session at the scheduled time of the appointment. Pt did not arrive on time and therapist called pt 10 minutes into the appointment, leaving a VM prompting them to join via [facility] or call the office or reach out through [facility] if they were having an issue. Pt's next follow-up appointment date/time was provided and pt was encouraged to attend that appointment or cancel if they were unable to make it. Therapist waited an addition 10-15 minutes for pt to join and when they did not the session was cancelled.

## 2023-09-12 ENCOUNTER — DOCUMENTATION ONLY (OUTPATIENT)
Dept: PSYCHOLOGY | Facility: CLINIC | Age: 28
End: 2023-09-12
Payer: COMMERCIAL

## 2023-09-12 NOTE — PROGRESS NOTES
Therapist (writer) entered the virtual session at the scheduled time of the appointment. Pt did not arrive on time and therapist called pt 10 minutes into the appointment, leaving a VM prompting them to join via [facility] or call the office or reach out through [facility] if they were having an issue. Pt's next follow-up appointment date/time was provided and pt was encouraged to attend that appointment or cancel if they were unable to make it. Therapist waited an addition 10-15 minutes for pt to join and when they did not the session was cancelled. A message checking in with pt was sent to inform them that if they no-showed to their next appointment they may be discharged as that would be their third in a row and encouraged to reach out if they had questions/concerns.

## 2023-09-19 ENCOUNTER — VIRTUAL VISIT (OUTPATIENT)
Dept: PSYCHOLOGY | Facility: CLINIC | Age: 28
End: 2023-09-19
Payer: COMMERCIAL

## 2023-09-19 DIAGNOSIS — F43.21 ADJUSTMENT DISORDER WITH DEPRESSED MOOD: Primary | ICD-10-CM

## 2023-09-19 PROCEDURE — 90834 PSYTX W PT 45 MINUTES: CPT | Mod: VID | Performed by: COUNSELOR
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
## 2023-09-26 ENCOUNTER — VIRTUAL VISIT (OUTPATIENT)
Dept: PSYCHOLOGY | Facility: CLINIC | Age: 28
End: 2023-09-26
Payer: COMMERCIAL

## 2023-09-26 DIAGNOSIS — F43.21 ADJUSTMENT DISORDER WITH DEPRESSED MOOD: Primary | ICD-10-CM

## 2023-09-26 PROCEDURE — 90837 PSYTX W PT 60 MINUTES: CPT | Mod: VID | Performed by: COUNSELOR

## 2023-09-26 NOTE — PROGRESS NOTES
[facility]                                     Progress Note    Patient Name: Sofi Escobedo  Date: 9/26/23         Service Type: Individual      Session Start Time: 10:01 am  Session End Time: 10:55 am     Session Length: 54 minutes    Session #: 13    Attendees: Client attended alone    Service Modality:  Video Visit:      Provider verified identity through the following two step process.  Patient provided:  Patient is known previously to provider    Telemedicine Visit: The patient's condition can be safely assessed and treated via synchronous audio and visual telemedicine encounter.      Reason for Telemedicine Visit: Services only offered telehealth    Originating Site (Patient Location): Patient's home    Distant Site (Provider Location): [facility] Outpatient Setting: [place]    Consent:  The patient/guardian has verbally consented to: the potential risks and benefits of telemedicine (video visit) versus in person care; bill my insurance or make self-payment for services provided; and responsibility for payment of non-covered services.     Patient would like the video invitation sent by:  My Chart    Mode of Communication:  [facility] Conference [facility]    Distant Location (Provider):  On-site    As the provider I attest to compliance with applicable laws and regulations related to telemedicine.    DATA  Interactive Complexity: No  Crisis: No  Extended Session (53+ minutes): PROLONGED SERVICE IN THE OUTPATIENT SETTING REQUIRING DIRECT (FACE-TO-FACE) PATIENT CONTACT BEYOND THE USUAL SERVICE:    - Patient's presenting concerns require more intensive intervention than could be completed within the usual service     Progress Since Last Session (Related to Symptoms / Goals / Homework):   Symptoms: Improving symptoms, feeling somewhat depressed    Homework: Partially completed      Episode of Care Goals: Satisfactory progress - ACTION (Actively working towards change);  "Intervened by reinforcing change plan / affirming steps taken     Current / Ongoing Stressors and Concerns:   Pt is currently seeking therapy due to ongoing anxiety/depression and to better manage his temper. Since last session, pt reported that he has been having some depressive symptoms. Pt clarified that he feels this way whenever he gets back from [facility] to [place]. Pt reflected that he likes living in [place] and that he doesn't want to move back to [facility], though whenever he comes back he continues to feel un-established which makes coming back not feel great. Pt then recounted how his most recent trip to [facility] went and in many ways was very challenging for him. Pt processed through how there is a lot of communication issues with his friends and family back home and they don't respect his time. Pt reflected that when he is in [place] [facility] he feels like a \"super star\" and when he comes back to [place] he feels like a \"small fish\" which he thinks is part of what makes him feel depressed. Pt processed through his recent relationships and how they cause him to feel unsatisfied, which compounds this. Pt reflected that he tends to fall into relationships that fallow unhelpful patterns, where he doesn't get his needs met while trying to seek validation. Pt admitted that part of this is due to him being afraid to let go of his past relationship with the one person he has felt he loved and still finds himself having feelings for. Pt also acknowledges that this person does not share these feelings anymore and that makes it all the more hard, as was demonstrated by their action when he visit [facility] most recently. Pt has found that using music has recently been a good coping skill and been a good source of motivation. Furthermore, he has been trying to push himself more in the gym and is focusing on trying to get into better shape for his birthday. Pt is planning on going to see a friend for his birthday in [place] and is trying to refocus on not " having expectations when it comes to romance to let things happen organically. Session took longer then anticipated due to presenting concerns needing to be addressed.     Personal Goals:  Good Credit   license  House  Be with a partner that is invested in pt and is financially independent     Treatment Objective(s) Addressed in This Session:   identify and compliment positives at least 3 times daily to support positive self-image  identify triggers, thoughts, and current stressors which contribute to feelings of anxiety  identify patterns of escalation  (i.e. tightness in chest, flushed face, increased heart rate, clenched hands, etc.)  identify at least 3 techniques for intervening on the escalation  use cognitive strategies identified in therapy to challenge anxious thoughts  Decrease frequency and intensity of feeling down, depressed, hopeless  Identify negative self-talk and behaviors: challenge core beliefs, myths, and actions  identify two areas of life that you would like to have improved functioning  identify at least 3 self-care activities     Intervention:   CBT: Reviewed recently behavior patterns and reinforcing cycle  Motivational Interviewing    MI Intervention: Expressed Empathy/Understanding, Supported Autonomy, Collaboration, Evocation, Permission to raise concern or advise, Open-ended questions, Reflections: simple and complex, Change talk (evoked), and Reframe     Change Talk Expressed by the Patient: Ability to change Reasons to change Need to change Committment to change Activation Taking steps    Provider Response to Change Talk: E - Evoked more info from patient about behavior change, A - Affirmed patient's thoughts, decisions, or attempts at behavior change, R - Reflected patient's change talk, and S - Summarized patient's change talk statements    Psychodynamic: Processed through internal-experiences related to anger and frustration management  Solution Focused: Identified  immediate areas of concern and potential barriers to success in relationships    Assessments completed prior to visit:  PHQ2:       9/26/2023     9:48 AM 9/19/2023     9:30 AM 6/19/2023     4:01 PM 6/13/2023     8:13 AM 3/7/2023    11:25 AM 3/7/2023    11:24 AM   PHQ-2 ( 1999 [employer])   Q1: Little interest or pleasure in doing things 1 1 0 1 1 1   Q2: Feeling down, depressed or hopeless 1 1 0 0 0 0   PHQ-2 Score 2 2 0 1 1 1   Q1: Little interest or pleasure in doing things Several days Several days Not at all Several days Several days Several days   Q2: Feeling down, depressed or hopeless Several days Several days Not at all Not at all Not at all Not at all   PHQ-2 Score 2 2 0 1 1 1     PHQ9:       4/12/2023     4:37 PM   PHQ-9 SCORE   PHQ-9 Total Score MyChart 5 (Mild depression)   PHQ-9 Total Score 5     GAD2:       4/12/2023     4:53 PM 7/18/2023     8:25 AM   BISHOP-2   Feeling nervous, anxious, or on edge 1 0    0   Not being able to stop or control worrying 1 0    0   BISHOP-2 Total Score 2 0    0        ASSESSMENT: Current Emotional / Mental Status (status of significant symptoms):   Risk status (Self / Other harm or suicidal ideation)   Patient denies current fears or concerns for personal safety.   Patient denies current or recent suicidal ideation or behaviors.   Patient denies current or recent homicidal ideation or behaviors.   Patient denies current or recent self injurious behavior or ideation.   Patient denies other safety concerns.   Patient reports there has been no change in risk factors since their last session.     Patient reports there has been no change in protective factors since their last session.     Recommended that patient call 911 or go to the local ED should there be a change in any of these risk factors.     Appearance:   Appropriate    Eye Contact:   Good    Psychomotor Behavior: Normal    Attitude:   Cooperative  Interested Friendly Pleasant   Orientation:   All   Speech    Rate /  "Production: Normal/ Responsive Talkative    Volume:  Normal    Mood:    Depressed  Normal   Affect:    Appropriate    Thought Content:  Clear    Thought Form:  Coherent  Logical    Insight:    Good  and Intellectual Insight     Medication Review:   No current psychiatric medications prescribed     Medication Compliance:   NA     Changes in Health Issues:   None reported     Chemical Use Review:   Substance Use: Chemical use reviewed, no active concerns identified      Tobacco Use: No change in amount of tobacco use since last session.  Provided encouragement to quit   Patient declined discussion at this time    Diagnosis:  1. Adjustment disorder with depressed mood        Collateral Reports Completed:   Not Applicable    PLAN: (Patient Tasks / Therapist Tasks / Other)  Continue to connect with friends  Resist the urge to engage in \"the lakeisha\" with people that don't want to be caught  Talk with sister about returning to [facility] (extra credit)  Set boundaries and self-advocate with family    LADONNA Paez                                                         ______________________________________________________________________    Individual Treatment Plan    Patient's Name: Sofi Escobedo  YOB: 1995    Date of Creation: 4/26/23  Date Treatment Plan Last Reviewed/Revised: 8/1/23    DSM5 Diagnoses: Adjustment Disorders  309.0 (F43.21) With depressed mood  Psychosocial / Contextual Factors: Pt recently moved to [place] from [place] and has been dealing with the culture shift. Pt has also been working on trying to get himself established here which has been challenging.  PROMIS (reviewed every 90 days):   PROMIS-10 Scores        4/13/2023     6:31 AM 7/18/2023     8:26 AM   PROMIS-10 Total Score w/o Sub Scores   PROMIS TOTAL - SUBSCORES 25 33    33       Referral / Collaboration:  Referral to another professional/service is not indicated at this time..    Anticipated number of session for this " episode of care: 9-12 sessions  Anticipation frequency of session: Weekly  Anticipated Duration of each session: 38-52 minutes  Treatment plan will be reviewed in 90 days or when goals have been changed.       MeasurableTreatment Goal(s) related to diagnosis / functional impairment(s)  Goal 1: Patient will better manage his anger/frustration, not getting as easily aggravated, and not letting comments get to him as easily.    I will know I've met my goal when I am able to not let others get to me so much.      Objective #A (Patient Action)    Patient will identify triggers, thoughts, and current stressors which contribute to feelings of anxiety  identify patterns of escalation  (i.e. tightness in chest, flushed face, increased heart rate, clenched hands, etc.)  identify at least 3 techniques for intervening on the escalation  use cognitive strategies identified in therapy to challenge anxious thoughts  Increase interest, engagement, and pleasure in doing things  Decrease frequency and intensity of feeling down, depressed, hopeless  Identify negative self-talk and behaviors: challenge core beliefs, myths, and actions  Improve concentration, focus, and mindfulness in daily activities   identify 3 coping strategies for improving mood  learn at least 3 self-regulation strategies.  Status: Continued - Date(s): 8/1/23     Intervention(s)  Therapist will assign homework related to target objective with the intent to promote pt autonomy and better manage MH.  Facilitate increase in self-awareness  Provide psycho-education on emotion identification/regulation and coping skills  Teach/promote utilization of mindfulness skills and grounding    Goal 2: Patient will feel worthy and be able to find validation internally.     I will know I've met my goal when able to feel more complete or worthy without needing it externally.      Objective #A (Patient Action)    Status: Continued - Date(s): 8/1/23     Patient will identify and  compliment positives at least 3 times daily to support positive self-image  Decrease frequency and intensity of feeling down, depressed, hopeless  Identify negative self-talk and behaviors: challenge core beliefs, myths, and actions  identify 5 traits or charcteristics you like about yourself  identify at least 3 self-care activities.    Intervention(s)  Therapist will assign homework related to target objective with the intent to promote pt autonomy and better manage MH.  Facilitate increase in self-awareness  Provide psycho-education on self-care/compassion and narrative therapy interventions  Teach/promote utilization of reframing and boundary setting    Objective #B Being more authentic with myself and others through improving my self-esteem.   Patient will participate in social activities to improve mood  learn & utilize at least 3 assertive communication skills weekly  identify 5 traits or charcteristics you like about yourself  identify at least 3 adaptive coping statements to counteract negative thoughts.    Status: Continued - Date(s): 8/1/23     Intervention(s)  Therapist will assign homework related to target objective with the intent to promote pt autonomy and better manage MH.  Facilitate increase in self-awareness  Provide psycho-education on self-esteem building and self-exploration  Teach/promote utilization of positive self-affirmations and critical observation skills    Patient has reviewed and agreed to the above plan.      LADONNA Paez  April 26, 2023

## 2023-10-03 ENCOUNTER — VIRTUAL VISIT (OUTPATIENT)
Dept: PSYCHOLOGY | Facility: CLINIC | Age: 28
End: 2023-10-03
Payer: COMMERCIAL

## 2023-10-03 DIAGNOSIS — F43.21 ADJUSTMENT DISORDER WITH DEPRESSED MOOD: Primary | ICD-10-CM

## 2023-10-03 PROCEDURE — 90834 PSYTX W PT 45 MINUTES: CPT | Mod: VID | Performed by: COUNSELOR
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
## 2023-10-09 ENCOUNTER — TELEPHONE (OUTPATIENT)
Dept: FAMILY MEDICINE | Facility: CLINIC | Age: 28
End: 2023-10-09
Payer: COMMERCIAL

## 2023-10-09 NOTE — TELEPHONE ENCOUNTER
Reason for Call:  Appointment Request    Patient requesting this type of appt:  std screenings    Requested provider:  LEONA BEYER    Reason patient unable to be scheduled: Not within requested timeframe    When does patient want to be seen/preferred time:  AS SOON AS POSSIBLE    Comments: PATIENT STD SCREENING AND A FLU SHOT THIS MONTH WITH DR. ADAN BEYER. NO CURRENT AVAILABILITY UNTIL NOVEMBER 13    Could we send this information to you in [facility] [employer] or would you prefer to receive a phone call?:   Patient would prefer a phone call   Okay to leave a detailed message?: Yes at Home number on file [phone] (home)    Call taken on 10/9/2023 at 10:49 AM by Christen Lazcano

## 2023-10-10 ENCOUNTER — VIRTUAL VISIT (OUTPATIENT)
Dept: PSYCHOLOGY | Facility: CLINIC | Age: 28
End: 2023-10-10
Payer: COMMERCIAL

## 2023-10-10 DIAGNOSIS — F43.21 ADJUSTMENT DISORDER WITH DEPRESSED MOOD: Primary | ICD-10-CM

## 2023-10-10 PROCEDURE — 90837 PSYTX W PT 60 MINUTES: CPT | Mod: VID | Performed by: COUNSELOR

## 2023-10-10 NOTE — PROGRESS NOTES
[facility]                                     Progress Note    Patient Name: Sofi Escobedo  Date: 10/10/23         Service Type: Individual      Session Start Time: 10:00 am  Session End Time: 11:00 am     Session Length: 60 minutes    Session #: 15    Attendees: Client attended alone    Service Modality:  Video Visit:      Provider verified identity through the following two step process.  Patient provided:  Patient is known previously to provider    Telemedicine Visit: The patient's condition can be safely assessed and treated via synchronous audio and visual telemedicine encounter.      Reason for Telemedicine Visit: Services only offered telehealth    Originating Site (Patient Location): Patient's home    Distant Site (Provider Location): [facility] Outpatient Setting: [place]    Consent:  The patient/guardian has verbally consented to: the potential risks and benefits of telemedicine (video visit) versus in person care; bill my insurance or make self-payment for services provided; and responsibility for payment of non-covered services.     Patient would like the video invitation sent by:  My Chart    Mode of Communication:  Video Conference via [facility]    Distant Location (Provider):  On-site    As the provider I attest to compliance with applicable laws and regulations related to telemedicine.    DATA  Interactive Complexity: No  Crisis: No  Extended Session (53+ minutes): PROLONGED SERVICE IN THE OUTPATIENT SETTING REQUIRING DIRECT (FACE-TO-FACE) PATIENT CONTACT BEYOND THE USUAL SERVICE:    - Patient's presenting concerns require more intensive intervention than could be completed within the usual service     Progress Since Last Session (Related to Symptoms / Goals / Homework):   Symptoms: Worsening symptoms, pt has been feeling more down and frustrated recently    Homework: Achieved / completed to satisfaction      Episode of Care Goals: Satisfactory progress -  PREPARATION (Decided to change - considering how); Intervened by negotiating a change plan and determining options / strategies for behavior change, identifying triggers, exploring social supports, and working towards setting a date to begin behavior change     Current / Ongoing Stressors and Concerns:   Pt is currently seeking therapy due to ongoing anxiety/depression and to better manage his temper. Since last session, pt reported that his interview went terrible, feeling ill prepared and had a rough week leading up to the interview day. Pt indicated feeling disappointed with how his interview went and frustrated by the lack of support he gets at work in general. Pt expressed that leading up to his interview day his ex in [facility] reaffirmed to him that she doesn't actually want to pursue reconciling, which hurt but in the end helped reaffirm to him his gut was right. Pt then had to deal with mandatory overtime, and having to deal with a co-worker that doesn't pull their weight caused work to be much more draining this past week leading up to the interview. Pt also verbalized how his dad reached out to him the day of his interview to get lotto tickets and didn't bother to mention his interview, which made pt feel like his family uses him. Pt discussed how he has been trying to focus more on himself and not get distracted by pursuing others or getting dragged into interpersonal drama. Pt sees the pattern where he feeds into some unhelpful patterns of others that reinforce them always taking from him and how he can work on self-advocating. Pt admitted that more recently he been feeling lonely and that it seems like he doesn't have a support system or those that care about him anymore. Pt recounted that since 5 years ago he has lost a good number of friend around this time of year, starting with a really good friend. Along with losing other people since then that meant a lot to him, he lost his cousin he was very close  3 years ago in October as well. After talking about it, pt clarified that these people were the people that he felt most supported by and actually cared about him. Pt reflected that all this loss has made October a challenging month for him and now, despite knowing this is a hard time for him, no one call to check in on him or how he is doing which reinforces these feelings. Pt now knows that trying to re-establish himself moreso in [place] and find meaningful connections is something he needs work on though finds that to be very challenging. Session went longer then anticipated due to presenting concerns.     Personal Goals:  Good Credit   license  House  Be with a partner that is invested in pt and is financially independent     Treatment Objective(s) Addressed in This Session:   identify and compliment positives at least 3 times daily to support positive self-image  identify triggers, thoughts, and current stressors which contribute to feelings of anxiety  identify patterns of escalation  (i.e. tightness in chest, flushed face, increased heart rate, clenched hands, etc.)  identify at least 3 techniques for intervening on the escalation  use cognitive strategies identified in therapy to challenge anxious thoughts  Decrease frequency and intensity of feeling down, depressed, hopeless  Identify negative self-talk and behaviors: challenge core beliefs, myths, and actions  identify two areas of life that you would like to have improved functioning  identify at least 3 self-care activities     Intervention:   CBT: Reviewed recently behavior patterns and reinforcing cycle  Motivational Interviewing    MI Intervention: Expressed Empathy/Understanding, Supported Autonomy, Collaboration, Evocation, Open-ended questions, Reflections: simple and complex, and Change talk (evoked)     Change Talk Expressed by the Patient: Desire to change Ability to change Reasons to change Need to change Committment to change  Activation Taking steps    Provider Response to Change Talk: E - Evoked more info from patient about behavior change, A - Affirmed patient's thoughts, decisions, or attempts at behavior change, R - Reflected patient's change talk, and S - Summarized patient's change talk statements    Psychodynamic: Processed through internal-experiences related to anger and frustration management  Solution Focused: Identified immediate areas of concern and potential barriers to success in relationships    Assessments completed prior to visit:  PHQ2:       9/26/2023     9:48 AM 9/19/2023     9:30 AM 6/19/2023     4:01 PM 6/13/2023     8:13 AM 3/7/2023    11:25 AM 3/7/2023    11:24 AM   PHQ-2 ( 1999 [employer])   Q1: Little interest or pleasure in doing things 1 1 0 1 1 1   Q2: Feeling down, depressed or hopeless 1 1 0 0 0 0   PHQ-2 Score 2 2 0 1 1 1   Q1: Little interest or pleasure in doing things Several days Several days Not at all Several days Several days Several days   Q2: Feeling down, depressed or hopeless Several days Several days Not at all Not at all Not at all Not at all   PHQ-2 Score 2 2 0 1 1 1     PHQ9:       4/12/2023     4:37 PM   PHQ-9 SCORE   PHQ-9 Total Score MyChart 5 (Mild depression)   PHQ-9 Total Score 5     GAD2:       4/12/2023     4:53 PM 7/18/2023     8:25 AM   BISHOP-2   Feeling nervous, anxious, or on edge 1 0    0   Not being able to stop or control worrying 1 0    0   BISHOP-2 Total Score 2 0    0        ASSESSMENT: Current Emotional / Mental Status (status of significant symptoms):   Risk status (Self / Other harm or suicidal ideation)   Patient denies current fears or concerns for personal safety.   Patient denies current or recent suicidal ideation or behaviors.   Patient denies current or recent homicidal ideation or behaviors.   Patient denies current or recent self injurious behavior or ideation.   Patient denies other safety concerns.   Patient reports there has been no change in risk factors since their  last session.     Patient reports there has been no change in protective factors since their last session.     Recommended that patient call 911 or go to the local ED should there be a change in any of these risk factors.     Appearance:   Appropriate    Eye Contact:   Good    Psychomotor Behavior: Normal    Attitude:   Cooperative  Interested Friendly Pleasant   Orientation:   All   Speech    Rate / Production: Normal/ Responsive Talkative    Volume:  Normal    Mood:    Depressed  Normal Sad    Affect:    Appropriate    Thought Content:  Clear    Thought Form:  Coherent  Logical    Insight:    Fair  and Intellectual Insight     Medication Review:   No current psychiatric medications prescribed     Medication Compliance:   NA     Changes in Health Issues:   None reported     Chemical Use Review:   Substance Use: Chemical use reviewed, no active concerns identified      Tobacco Use: No change in amount of tobacco use since last session.  Provided encouragement to quit   Patient declined discussion at this time    Diagnosis:  1. Adjustment disorder with depressed mood        Collateral Reports Completed:   Not Applicable    PLAN: (Patient Tasks / Therapist Tasks / Other)  Set boundaries and self-advocate with family  Keep sustaining going to the gym  Look into fiduciary (extra credit)  Try letter writing exercise to friend who passed (Dajanae)      Judd Clemons, [facility]                                                         ______________________________________________________________________    Individual Treatment Plan    Patient's Name: Sofi Escobedo  YOB: 1995    Date of Creation: 4/26/23  Date Treatment Plan Last Reviewed/Revised: 8/1/23    DSM5 Diagnoses: Adjustment Disorders  309.0 (F43.21) With depressed mood  Psychosocial / Contextual Factors: Pt recently moved to [place] from [place] and has been dealing with the culture shift. Pt has also been working on trying to get himself established  here which has been challenging.  PROMIS (reviewed every 90 days):   PROMIS-10 Scores        4/13/2023     6:31 AM 7/18/2023     8:26 AM   PROMIS-10 Total Score w/o Sub Scores   PROMIS TOTAL - SUBSCORES 25 33    33       Referral / Collaboration:  Referral to another professional/service is not indicated at this time..    Anticipated number of session for this episode of care: 9-12 sessions  Anticipation frequency of session: Weekly  Anticipated Duration of each session: 38-52 minutes  Treatment plan will be reviewed in 90 days or when goals have been changed.       MeasurableTreatment Goal(s) related to diagnosis / functional impairment(s)  Goal 1: Patient will better manage his anger/frustration, not getting as easily aggravated, and not letting comments get to him as easily.    I will know I've met my goal when I am able to not let others get to me so much.      Objective #A (Patient Action)    Patient will identify triggers, thoughts, and current stressors which contribute to feelings of anxiety  identify patterns of escalation  (i.e. tightness in chest, flushed face, increased heart rate, clenched hands, etc.)  identify at least 3 techniques for intervening on the escalation  use cognitive strategies identified in therapy to challenge anxious thoughts  Increase interest, engagement, and pleasure in doing things  Decrease frequency and intensity of feeling down, depressed, hopeless  Identify negative self-talk and behaviors: challenge core beliefs, myths, and actions  Improve concentration, focus, and mindfulness in daily activities   identify 3 coping strategies for improving mood  learn at least 3 self-regulation strategies.  Status: Continued - Date(s): 8/1/23     Intervention(s)  Therapist will assign homework related to target objective with the intent to promote pt autonomy and better manage MH.  Facilitate increase in self-awareness  Provide psycho-education on emotion identification/regulation and coping  skills  Teach/promote utilization of mindfulness skills and grounding    Goal 2: Patient will feel worthy and be able to find validation internally.     I will know I've met my goal when able to feel more complete or worthy without needing it externally.      Objective #A (Patient Action)    Status: Continued - Date(s): 8/1/23     Patient will identify and compliment positives at least 3 times daily to support positive self-image  Decrease frequency and intensity of feeling down, depressed, hopeless  Identify negative self-talk and behaviors: challenge core beliefs, myths, and actions  identify 5 traits or charcteristics you like about yourself  identify at least 3 self-care activities.    Intervention(s)  Therapist will assign homework related to target objective with the intent to promote pt autonomy and better manage MH.  Facilitate increase in self-awareness  Provide psycho-education on self-care/compassion and narrative therapy interventions  Teach/promote utilization of reframing and boundary setting    Objective #B Being more authentic with myself and others through improving my self-esteem.   Patient will participate in social activities to improve mood  learn & utilize at least 3 assertive communication skills weekly  identify 5 traits or charcteristics you like about yourself  identify at least 3 adaptive coping statements to counteract negative thoughts.    Status: Continued - Date(s): 8/1/23     Intervention(s)  Therapist will assign homework related to target objective with the intent to promote pt autonomy and better manage MH.  Facilitate increase in self-awareness  Provide psycho-education on self-esteem building and self-exploration  Teach/promote utilization of positive self-affirmations and critical observation skills    Patient has reviewed and agreed to the above plan.      Judd Cleomns, LADONNA  April 26, 2023

## 2023-10-17 ENCOUNTER — VIRTUAL VISIT (OUTPATIENT)
Dept: PSYCHOLOGY | Facility: CLINIC | Age: 28
End: 2023-10-17

## 2023-10-17 DIAGNOSIS — F43.21 ADJUSTMENT DISORDER WITH DEPRESSED MOOD: Primary | ICD-10-CM

## 2023-10-17 PROCEDURE — 90837 PSYTX W PT 60 MINUTES: CPT | Mod: VID | Performed by: COUNSELOR

## 2023-10-17 NOTE — PROGRESS NOTES
[facility]                                     Progress Note    Patient Name: Sofi Escobedo  Date: 10/17/23         Service Type: Individual      Session Start Time: 10:01 am  Session End Time: 11:02 am     Session Length: 62 minutes    Session #: 16    Attendees: Client attended alone    Service Modality:  Video Visit:      Provider verified identity through the following two step process.  Patient provided:  Patient is known previously to provider    Telemedicine Visit: The patient's condition can be safely assessed and treated via synchronous audio and visual telemedicine encounter.      Reason for Telemedicine Visit: Services only offered telehealth    Originating Site (Patient Location): Patient's home    Distant Site (Provider Location): [facility] Outpatient Setting: [place]    Consent:  The patient/guardian has verbally consented to: the potential risks and benefits of telemedicine (video visit) versus in person care; bill my insurance or make self-payment for services provided; and responsibility for payment of non-covered services.     Patient would like the video invitation sent by:  My Chart    Mode of Communication:  [facility]    Distant Location (Provider):  On-site    As the provider I attest to compliance with applicable laws and regulations related to telemedicine.    DATA  Interactive Complexity: No  Crisis: No  Extended Session (53+ minutes): PROLONGED SERVICE IN THE OUTPATIENT SETTING REQUIRING DIRECT (FACE-TO-FACE) PATIENT CONTACT BEYOND THE USUAL SERVICE:    - Patient's presenting concerns require more intensive intervention than could be completed within the usual service     Progress Since Last Session (Related to Symptoms / Goals / Homework):   Symptoms: Worsening pt has felt sad and frustated    Homework: Partially completed      Episode of Care Goals: Satisfactory progress - PREPARATION (Decided to change - considering how);  Intervened by negotiating a change plan and determining options / strategies for behavior change, identifying triggers, exploring social supports, and working towards setting a date to begin behavior change     Current / Ongoing Stressors and Concerns:   Pt is currently seeking therapy due to ongoing anxiety/depression and to better manage his temper. Since last session, pt reported that the anniversary of his friend passing way was this past weekend and felt very sad to the point he was crying. The day of his friend's death he tried reaching out to one of his local ex's to help him feel better, though they didn't give a constructive response. Pt found himself drinking to cope with his feelings, and then went on a date which he drank more at instead. The date itself went well and enjoyed his time with his female , though is thinking that he doesn't have the emotional capacity to entertain being in a real relationship with someone at this time. Pt discussed how he feels a lot of his relationships are conditional and agreed that his relationships that felt unconditional have been lost. Pt discussed how he still is feeling hurt by his first girlfriend disconnecting from him recently, and processed through that. Pt agreed that letting go of that relationship is scary for him, due to not wanting to loose more people in his life. However, pt verbalzied that this isn't helpful because it just reinforces his unhelpful relationship patterns of feeling like he needs to give in order to get anything in his relationships. Pt also recognized that the person he used to love is no longer the person they are today and that is another hard barrier to accept. Pt recently found himself doing something that he said he would never do, he paid for someone's private (ie adult) [employer]zzle, though it didn't have any actual adult content which made it feel like adding insult to injury. Pt clarified that this was someone he personally  knew online, but this was the first time he decided to engage in this activity. Pt reflected again that this is another example of him feeling like he needs to giving in order to get something. Pt explored how his internal dialogue tends to be very self-critical which reinforces a lot of these feeling/thoughts he has about being worthy of unconditional love. Pt also expressed realizing that he holds a lot of pain inside from his past and much of the time he feels lost as as adult with not clear guidance. Pt's racial experiences compound this problem for him and he doesn't feel he ever got good guidance about being the man he wants to be. Pt acknowledged that being able to be kinder to himself and how he needs to work on self-compassion, instead of framing himself a villain in other peoples stories. Session went longer then anticipated due to presenting concerns.     Personal Goals:  Good Credit   license  House  Be with a partner that is invested in pt and is financially independent     Treatment Objective(s) Addressed in This Session:   identify and compliment positives at least 3 times daily to support positive self-image  identify triggers, thoughts, and current stressors which contribute to feelings of anxiety  identify patterns of escalation  (i.e. tightness in chest, flushed face, increased heart rate, clenched hands, etc.)  identify at least 3 techniques for intervening on the escalation  use cognitive strategies identified in therapy to challenge anxious thoughts  Decrease frequency and intensity of feeling down, depressed, hopeless  Identify negative self-talk and behaviors: challenge core beliefs, myths, and actions  identify two areas of life that you would like to have improved functioning  identify at least 3 self-care activities     Intervention:   CBT: Reviewed recently behavior patterns and reinforcing cycle  Motivational Interviewing    MI Intervention: Expressed  Empathy/Understanding, Supported Autonomy, Collaboration, Evocation, Open-ended questions, Reflections: simple and complex, and Change talk (evoked)     Change Talk Expressed by the Patient: Desire to change Ability to change Reasons to change Need to change Committment to change Activation Taking steps    Provider Response to Change Talk: E - Evoked more info from patient about behavior change, A - Affirmed patient's thoughts, decisions, or attempts at behavior change, R - Reflected patient's change talk, and S - Summarized patient's change talk statements    Psychodynamic: Processed through internal-experiences related to anger and frustration management  Solution Focused: Identified immediate areas of concern and potential barriers to success in relationships    Assessments completed prior to visit:  PHQ2:       9/26/2023     9:48 AM 9/19/2023     9:30 AM 6/19/2023     4:01 PM 6/13/2023     8:13 AM 3/7/2023    11:25 AM 3/7/2023    11:24 AM   PHQ-2 ( 1999 [employer])   Q1: Little interest or pleasure in doing things 1 1 0 1 1 1   Q2: Feeling down, depressed or hopeless 1 1 0 0 0 0   PHQ-2 Score 2 2 0 1 1 1   Q1: Little interest or pleasure in doing things Several days Several days Not at all Several days Several days Several days   Q2: Feeling down, depressed or hopeless Several days Several days Not at all Not at all Not at all Not at all   PHQ-2 Score 2 2 0 1 1 1     PHQ9:       4/12/2023     4:37 PM   PHQ-9 SCORE   PHQ-9 Total Score MyChart 5 (Mild depression)   PHQ-9 Total Score 5     GAD2:       4/12/2023     4:53 PM 7/18/2023     8:25 AM 10/17/2023     9:53 AM   BISHOP-2   Feeling nervous, anxious, or on edge 1 0    0 1    1   Not being able to stop or control worrying 1 0    0 1    1   BISHOP-2 Total Score 2 0    0 2    2        ASSESSMENT: Current Emotional / Mental Status (status of significant symptoms):   Risk status (Self / Other harm or suicidal ideation)   Patient denies current fears or concerns for personal  safety.   Patient denies current or recent suicidal ideation or behaviors.   Patient denies current or recent homicidal ideation or behaviors.   Patient denies current or recent self injurious behavior or ideation.   Patient denies other safety concerns.   Patient reports there has been no change in risk factors since their last session.     Patient reports there has been no change in protective factors since their last session.     Recommended that patient call 911 or go to the local ED should there be a change in any of these risk factors.     Appearance:   Appropriate    Eye Contact:   Good    Psychomotor Behavior: Normal    Attitude:   Cooperative  Interested Friendly Pleasant   Orientation:   All   Speech    Rate / Production: Normal/ Responsive Talkative    Volume:  Normal    Mood:    Depressed  Normal Sad    Affect:    Appropriate    Thought Content:  Clear    Thought Form:  Coherent  Logical    Insight:    Good  and Intellectual Insight     Medication Review:   No current psychiatric medications prescribed     Medication Compliance:   NA     Changes in Health Issues:   None reported     Chemical Use Review:   Substance Use: Chemical use reviewed, no active concerns identified      Tobacco Use: No change in amount of tobacco use since last session.  Provided encouragement to quit   Patient declined discussion at this time    Diagnosis:  1. Adjustment disorder with depressed mood        Collateral Reports Completed:   Not Applicable    PLAN: (Patient Tasks / Therapist Tasks / Other)  Set boundaries and self-advocate with family  Keep sustaining going to the gym  Look into fiduciary (extra credit)  Try letter writing exercise to friend who passed (Dajanae)  Practice reframing yourself as the hero of your own story  Attempt to engage in positive self-talk and self-compassion  Think about how you reinforce relationships that feel conditional    Judd Clemons [facility]                                                          ______________________________________________________________________    Individual Treatment Plan    Patient's Name: Sofi Escobedo  YOB: 1995    Date of Creation: 4/26/23  Date Treatment Plan Last Reviewed/Revised: 8/1/23    DSM5 Diagnoses: Adjustment Disorders  309.0 (F43.21) With depressed mood  Psychosocial / Contextual Factors: Pt recently moved to [place] from [place] and has been dealing with the culture shift. Pt has also been working on trying to get himself established here which has been challenging.  PROMIS (reviewed every 90 days):   PROMIS-10 Scores        4/13/2023     6:31 AM 7/18/2023     8:26 AM 10/17/2023     9:55 AM   PROMIS-10 Total Score w/o Sub Scores   PROMIS TOTAL - SUBSCORES 25 33    33 26    26       Referral / Collaboration:  Referral to another professional/service is not indicated at this time..    Anticipated number of session for this episode of care: 9-12 sessions  Anticipation frequency of session: Weekly  Anticipated Duration of each session: 38-52 minutes  Treatment plan will be reviewed in 90 days or when goals have been changed.       MeasurableTreatment Goal(s) related to diagnosis / functional impairment(s)  Goal 1: Patient will better manage his anger/frustration, not getting as easily aggravated, and not letting comments get to him as easily.    I will know I've met my goal when I am able to not let others get to me so much.      Objective #A (Patient Action)    Patient will identify triggers, thoughts, and current stressors which contribute to feelings of anxiety  identify patterns of escalation  (i.e. tightness in chest, flushed face, increased heart rate, clenched hands, etc.)  identify at least 3 techniques for intervening on the escalation  use cognitive strategies identified in therapy to challenge anxious thoughts  Increase interest, engagement, and pleasure in doing things  Decrease frequency and intensity of feeling down, depressed,  hopeless  Identify negative self-talk and behaviors: challenge core beliefs, myths, and actions  Improve concentration, focus, and mindfulness in daily activities   identify 3 coping strategies for improving mood  learn at least 3 self-regulation strategies.  Status: Continued - Date(s): 8/1/23     Intervention(s)  Therapist will assign homework related to target objective with the intent to promote pt autonomy and better manage MH.  Facilitate increase in self-awareness  Provide psycho-education on emotion identification/regulation and coping skills  Teach/promote utilization of mindfulness skills and grounding    Goal 2: Patient will feel worthy and be able to find validation internally.     I will know I've met my goal when able to feel more complete or worthy without needing it externally.      Objective #A (Patient Action)    Status: Continued - Date(s): 8/1/23     Patient will identify and compliment positives at least 3 times daily to support positive self-image  Decrease frequency and intensity of feeling down, depressed, hopeless  Identify negative self-talk and behaviors: challenge core beliefs, myths, and actions  identify 5 traits or charcteristics you like about yourself  identify at least 3 self-care activities.    Intervention(s)  Therapist will assign homework related to target objective with the intent to promote pt autonomy and better manage MH.  Facilitate increase in self-awareness  Provide psycho-education on self-care/compassion and narrative therapy interventions  Teach/promote utilization of reframing and boundary setting    Objective #B Being more authentic with myself and others through improving my self-esteem.   Patient will participate in social activities to improve mood  learn & utilize at least 3 assertive communication skills weekly  identify 5 traits or charcteristics you like about yourself  identify at least 3 adaptive coping statements to counteract negative thoughts.    Status:  Continued - Date(s): 8/1/23     Intervention(s)  Therapist will assign homework related to target objective with the intent to promote pt autonomy and better manage MH.  Facilitate increase in self-awareness  Provide psycho-education on self-esteem building and self-exploration  Teach/promote utilization of positive self-affirmations and critical observation skills    Patient has reviewed and agreed to the above plan.      Judd Clemons, [facility]  April 26, 2023

## 2023-10-24 ENCOUNTER — DOCUMENTATION ONLY (OUTPATIENT)
Dept: BEHAVIORAL HEALTH | Facility: HOSPITAL | Age: 28
End: 2023-10-24
Payer: COMMERCIAL

## 2023-10-24 NOTE — PROGRESS NOTES
Therapist (writer) entered the virtual session at the scheduled time of the appointment. Pt did not arrive on time and therapist called pt 11 minutes into the appointment, leaving a VM prompting them to join via [employer] or call the office or reach out through [employer] if they were having an issue. Pt's next follow-up appointment date/time was provided and pt was encouraged to attend that appointment or cancel if they were unable to make it. Therapist waited an addition 10-15 minutes for pt to join and when they did not the session was cancelled.

## 2023-10-31 ENCOUNTER — VIRTUAL VISIT (OUTPATIENT)
Dept: PSYCHOLOGY | Facility: CLINIC | Age: 28
End: 2023-10-31
Payer: COMMERCIAL

## 2023-10-31 DIAGNOSIS — F43.21 ADJUSTMENT DISORDER WITH DEPRESSED MOOD: Primary | ICD-10-CM

## 2023-10-31 PROCEDURE — 90837 PSYTX W PT 60 MINUTES: CPT | Mod: VID | Performed by: COUNSELOR

## 2023-10-31 NOTE — PROGRESS NOTES
[facility]                                     Progress Note    Patient Name: Sofi Escobedo  Date: 10/31/23         Service Type: Individual      Session Start Time: 10:01 am  Session End Time: 10:59 am     Session Length: 58 minutes    Session #: 17    Attendees: Client attended alone    Service Modality:  Video Visit:      Provider verified identity through the following two step process.  Patient provided:  Patient is known previously to provider    Telemedicine Visit: The patient's condition can be safely assessed and treated via synchronous audio and visual telemedicine encounter.      Reason for Telemedicine Visit: Services only offered telehealth    Originating Site (Patient Location): Patient's home    Distant Site (Provider Location): [facility] Outpatient Setting: [place]    Consent:  The patient/guardian has verbally consented to: the potential risks and benefits of telemedicine (video visit) versus in person care; bill my insurance or make self-payment for services provided; and responsibility for payment of non-covered services.     Patient would like the video invitation sent by:  My Chart    Mode of Communication:  Video Conference via [facility]    Distant Location (Provider):  On-site    As the provider I attest to compliance with applicable laws and regulations related to telemedicine.    DATA  Interactive Complexity: No  Crisis: No  Extended Session (53+ minutes): PROLONGED SERVICE IN THE OUTPATIENT SETTING REQUIRING DIRECT (FACE-TO-FACE) PATIENT CONTACT BEYOND THE USUAL SERVICE:    - Longer session due to limited access to mental health appointments and necessity to address patient's distress / complexity    - Patient's presenting concerns require more intensive intervention than could be completed within the usual service     Progress Since Last Session (Related to Symptoms / Goals / Homework):   Symptoms: Improving pt endorses feeling better    Homework:  "Achieved / completed to satisfaction      Episode of Care Goals: Satisfactory progress - PREPARATION (Decided to change - considering how); Intervened by negotiating a change plan and determining options / strategies for behavior change, identifying triggers, exploring social supports, and working towards setting a date to begin behavior change     Current / Ongoing Stressors and Concerns:   Pt is currently seeking therapy due to ongoing anxiety/depression and to better manage his temper. Pt did end up being sick last week, which caused him to miss last session and it may have been COVID, though this week is feeling better. Since last session, pt reported that other then the rough week he had where he didn't want to get out of bed, he as been doing well. Pt processed that through what he did to try to help him make feel better. Pt's friend reached out to encouraged him to attend a concert of pt's favorite rapper, which was a top five experience for him. Pt also spent time with a friend IRL which he had been virtually connecting with, which was actually a lot of fun and thought they connected really well. Pt felt this was very helpful in not getting stuck in his depressive feels related to losing support people in his life 5 years ago in October. Pt reflected that he is interested in dating, and yet doesn't want to pursue a long term things right now. Pt is fine if something happens naturally though doesn't feel he has the emotional energy to actively pursue one. Pt has noticed that he is doing progressively better at not judging himself, and focusing on the progress he has made not how far from his goals he is. Pt has been working on saying \"no\" to people and family, so he can focus more on himself, which he thinks is another sign of progress. Work has been exceedingly stressful, to the point pt has not felt that safety is being adequately addressed and management has been making choices that are exacerbating " staffing issues. Pt verbalized his frustrations with the job and has concluded that he needs to find another job. 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 Pt financially feels that he is stretched very thin, and is now recognizing that he has been spending to much on food or going out. Pt sat down to figure out a plan related to meal planning and being more responsible with his money. Pt also wants to start his personal workout routine, which requires him to get a laptop or tablet, which is another financial stressor he is trying to manage. Pt recently started reading a book on being the best man he can be and is finding it helpful in reframing things into a more constructive format when it comes to his relationship to himself and others. Session went longer then anticipated due to missing recent therapy and presenting concerns.    Personal Goals:  Good Credit   license  House  Be with a partner that is invested in pt and is financially independent     Treatment Objective(s) Addressed in This Session:   identify and compliment positives at least 3 times daily to support positive self-image  identify triggers, thoughts, and current stressors which contribute to feelings of anxiety  identify patterns of escalation  (i.e. tightness in chest, flushed face, increased heart rate, clenched hands, etc.)  identify at least 3 techniques for intervening on the escalation  use cognitive strategies identified in therapy to challenge anxious thoughts  Decrease frequency and intensity of feeling down, depressed, hopeless  Identify negative self-talk and behaviors: challenge core beliefs, myths, and actions  identify two areas of life that you would like to have improved functioning  identify at least 3 self-care activities     Intervention:   CBT: Reviewed recently behavior patterns and reinforcing cycle  Motivational Interviewing    MI Intervention:  Expressed Empathy/Understanding, Supported Autonomy, Collaboration, Evocation, Open-ended questions, Reflections: simple and complex, Change talk (evoked), and Reframe     Change Talk Expressed by the Patient: Desire to change Ability to change Reasons to change Committment to change Activation Taking steps    Provider Response to Change Talk: E - Evoked more info from patient about behavior change, A - Affirmed patient's thoughts, decisions, or attempts at behavior change, R - Reflected patient's change talk, and S - Summarized patient's change talk statements    Psychodynamic: Processed through internal-experiences related to anger and frustration management  Solution Focused: Identified immediate areas of concern and potential barriers to success in relationships    Assessments completed prior to visit:  PHQ2:       9/26/2023     9:48 AM 9/19/2023     9:30 AM 6/19/2023     4:01 PM 6/13/2023     8:13 AM 3/7/2023    11:25 AM 3/7/2023    11:24 AM   PHQ-2 ( 1999 [employer])   Q1: Little interest or pleasure in doing things 1 1 0 1 1 1   Q2: Feeling down, depressed or hopeless 1 1 0 0 0 0   PHQ-2 Score 2 2 0 1 1 1   Q1: Little interest or pleasure in doing things Several days Several days Not at all Several days Several days Several days   Q2: Feeling down, depressed or hopeless Several days Several days Not at all Not at all Not at all Not at all   PHQ-2 Score 2 2 0 1 1 1     PHQ9:       4/12/2023     4:37 PM   PHQ-9 SCORE   PHQ-9 Total Score MyChart 5 (Mild depression)   PHQ-9 Total Score 5     GAD2:       4/12/2023     4:53 PM 7/18/2023     8:25 AM 10/17/2023     9:53 AM 10/30/2023    12:07 PM   BISHOP-2   Feeling nervous, anxious, or on edge 1 0    0 1    1 1   Not being able to stop or control worrying 1 0    0 1    1 1   BISHOP-2 Total Score 2 0    0 2    2 2        ASSESSMENT: Current Emotional / Mental Status (status of significant symptoms):   Risk status (Self / Other harm or suicidal ideation)   Patient denies  current fears or concerns for personal safety.   Patient denies current or recent suicidal ideation or behaviors.   Patient denies current or recent homicidal ideation or behaviors.   Patient denies current or recent self injurious behavior or ideation.   Patient denies other safety concerns.   Patient reports there has been no change in risk factors since their last session.     Patient reports there has been no change in protective factors since their last session.     Recommended that patient call 911 or go to the local ED should there be a change in any of these risk factors.     Appearance:   Appropriate    Eye Contact:   Good    Psychomotor Behavior: Normal    Attitude:   Cooperative  Interested Friendly Pleasant   Orientation:   All   Speech    Rate / Production: Normal/ Responsive Talkative    Volume:  Normal    Mood:    Depressed  Normal   Affect:    Appropriate    Thought Content:  Clear    Thought Form:  Coherent  Logical    Insight:    Good  and Intellectual Insight     Medication Review:   No current psychiatric medications prescribed     Medication Compliance:   NA     Changes in Health Issues:   None reported     Chemical Use Review:   Substance Use: Chemical use reviewed, no active concerns identified      Tobacco Use: No change in amount of tobacco use since last session.  Provided encouragement to quit   Patient declined discussion at this time    Diagnosis:  1. Adjustment disorder with depressed mood      Collateral Reports Completed:   Not Applicable    PLAN: (Patient Tasks / Therapist Tasks / Other)  Try letter writing exercise to friend who passed ([place])  Attempt to engage in positive self-talk and self-compassion  Return to the gym  Figure out the Tablet vs Laptop situation  Follow-up or respond to job application      LADONNA Paez                                                         ______________________________________________________________________    Individual Treatment  Plan    Patient's Name: Sofi Escobedo  YOB: 1995    Date of Creation: 4/26/23  Date Treatment Plan Last Reviewed/Revised: 8/1/23    DSM5 Diagnoses: Adjustment Disorders  309.0 (F43.21) With depressed mood  Psychosocial / Contextual Factors: Pt recently moved to [place] from [place] and has been dealing with the culture shift. Pt has also been working on trying to get himself established here which has been challenging.  PROMIS (reviewed every 90 days):   PROMIS-10 Scores        7/18/2023     8:26 AM 10/17/2023     9:55 AM 10/30/2023    12:08 PM   PROMIS-10 Total Score w/o Sub Scores   PROMIS TOTAL - SUBSCORES 33    33 26    26 32       Referral / Collaboration:  Referral to another professional/service is not indicated at this time..    Anticipated number of session for this episode of care: 9-12 sessions  Anticipation frequency of session: Weekly  Anticipated Duration of each session: 38-52 minutes  Treatment plan will be reviewed in 90 days or when goals have been changed.       MeasurableTreatment Goal(s) related to diagnosis / functional impairment(s)  Goal 1: Patient will better manage his anger/frustration, not getting as easily aggravated, and not letting comments get to him as easily.    I will know I've met my goal when I am able to not let others get to me so much.      Objective #A (Patient Action)    Patient will identify triggers, thoughts, and current stressors which contribute to feelings of anxiety  identify patterns of escalation  (i.e. tightness in chest, flushed face, increased heart rate, clenched hands, etc.)  identify at least 3 techniques for intervening on the escalation  use cognitive strategies identified in therapy to challenge anxious thoughts  Increase interest, engagement, and pleasure in doing things  Decrease frequency and intensity of feeling down, depressed, hopeless  Identify negative self-talk and behaviors: challenge core beliefs, myths, and actions  Improve  concentration, focus, and mindfulness in daily activities   identify 3 coping strategies for improving mood  learn at least 3 self-regulation strategies.  Status: Continued - Date(s): 8/1/23     Intervention(s)  Therapist will assign homework related to target objective with the intent to promote pt autonomy and better manage MH.  Facilitate increase in self-awareness  Provide psycho-education on emotion identification/regulation and coping skills  Teach/promote utilization of mindfulness skills and grounding    Goal 2: Patient will feel worthy and be able to find validation internally.     I will know I've met my goal when able to feel more complete or worthy without needing it externally.      Objective #A (Patient Action)    Status: Continued - Date(s): 8/1/23     Patient will identify and compliment positives at least 3 times daily to support positive self-image  Decrease frequency and intensity of feeling down, depressed, hopeless  Identify negative self-talk and behaviors: challenge core beliefs, myths, and actions  identify 5 traits or charcteristics you like about yourself  identify at least 3 self-care activities.    Intervention(s)  Therapist will assign homework related to target objective with the intent to promote pt autonomy and better manage MH.  Facilitate increase in self-awareness  Provide psycho-education on self-care/compassion and narrative therapy interventions  Teach/promote utilization of reframing and boundary setting    Objective #B Being more authentic with myself and others through improving my self-esteem.   Patient will participate in social activities to improve mood  learn & utilize at least 3 assertive communication skills weekly  identify 5 traits or charcteristics you like about yourself  identify at least 3 adaptive coping statements to counteract negative thoughts.    Status: Continued - Date(s): 8/1/23     Intervention(s)  Therapist will assign homework related to target  objective with the intent to promote pt autonomy and better manage MH.  Facilitate increase in self-awareness  Provide psycho-education on self-esteem building and self-exploration  Teach/promote utilization of positive self-affirmations and critical observation skills    Patient has reviewed and agreed to the above plan.      Judd Clemons, [facility]  April 26, 2023

## 2023-11-01 ASSESSMENT — ENCOUNTER SYMPTOMS
DYSURIA: 0
JOINT SWELLING: 0
DIARRHEA: 0
NERVOUS/ANXIOUS: 0
WEAKNESS: 0
PARESTHESIAS: 0
SORE THROAT: 0
ARTHRALGIAS: 0
ABDOMINAL PAIN: 0
COUGH: 0
FREQUENCY: 0
HEARTBURN: 0
NAUSEA: 0
DIZZINESS: 0
CHILLS: 0
SHORTNESS OF BREATH: 0
CONSTIPATION: 0
EYE PAIN: 0
FEVER: 0
HEADACHES: 0
HEMATOCHEZIA: 0
MYALGIAS: 0
HEMATURIA: 0

## 2023-11-01 NOTE — COMMUNITY RESOURCES LIST (ENGLISH)
11/01/2023   [facility]  N/A  For questions about this resource list or additional care needs, please contact your primary care clinic or care manager.  Phone: [phone]   Email: N/A   Address: [address] [place] [address]   Hours: N/A        Hotlines and Helplines       Hotline - Housing crisis  1  [facility] Distance: 1.32 miles      Phone/Virtual   [address] [place] [address]  Language: English  Hours: Mon - Sun Open 24 Hours   Phone: (807) 140-8838 Email: communications@[facility].org Website: https://[facility]-[place].org/oursaviourshousing/     2  [facility] Distance: 2.06 miles      Phone/Virtual   [address] [place] [address]  Language: English  Hours: Mon - Sun Open 24 Hours   Phone: (910) 924-9503 Email: info@[facility].org Website: http://www.[facility].org          Housing       Coordinated Entry access point  3  [facility] [place] ([facility] - [facility] Services Distance: 1.24 miles      In-Person   [address] [place] [address]  Language: English  Hours: Mon - Fri 9:00 AM - 5:00 PM  Fees: Free   Phone: (546) 640-7687 Email: housing@[facility].org Website: http://www.[facility].org/housing     4  [facility] - [facility] Distance: 2.35 miles      In-Person   [address] [place] [address]  Language: English  Hours: Mon - Sat 10:00 PM - 5:00 PM  Fees: Free   Phone: (305) 845-3214 Email: info@[facility].[employer] Website: http://www.[facility].org/     Drop-in center or day shelter  5  [facility] Distance: 1.66 miles      In-Person   [address] [place] [address]  Language: English  Hours: Mon - Fri 12:00 PM - 3:00 PM  Fees: Free   Phone: (105) 431-9164 Email: [employer]communGenomas@[employer] Website: http://[employer].org/     6  [facility] - [facility] Distance: 1.76 miles      In-Person   [address], [place] [address]  Language: English, Kyrgyz, Macedonian  Hours: Mon - Sat 7:00 AM - 3:00 PM  Fees: Free, Self Pay   Phone: (713) 947-4434 Email: info@[employer] Website: https://www.[employer].org/locations/opportunity-center/     Housing search assistance  7  [facility] - Office of Multicultural Services Distance: 1.12 miles      Phone/Virtual   [address] [place] [address]  Language: American Sign Language, Turkish, Serbian, English, Solomon Islander, [place], Oromo, Czech, Kyrgyz, Macedonian, Swahili, British, Bulgarian  Hours: Mon - Tue 9:00 AM - 4:00 PM , Wed 10:00 AM - 5:00 PM , Thu - Fri 9:00 AM - 4:00 PM  Fees: Free   Phone: (718) 181-5113 Email: oms@[facility] Website: http://www.[facility]/residents/human-services/multi-cultural-services     8  [facility] Distance: 1.54 miles      In-Person   [address] [place] [address]  Language: English, Kyrgyz, Macedonian  Hours: Mon - Fri 8:30 AM - 4:30 PM  Fees: Free   Phone: (719) 745-1028 Email: priti@[facility].org Website: http://www.[facility] Center[facility].org/     Shelter for families  9  [facility] Distance: 19.85 miles      In-Person   [address] [place] [address]  Language: English  Hours: Mon - Fri 3:00 PM - 9:00 AM , Sat - Sun Open 24 Hours  Fees: Free   Phone: (167) 913-4589 Ext.1 Website: https://www.[facility].org/2020/07/03/emergency-family-shelter/     Shelter for individuals  10  [facility] Distance: 1.32 miles      In-Person   [address] [place] [address]  Language: English  Hours: Mon - Sun Open 24 Hours  Fees: Free   Phone: (784) 364-2148 Email: communications@[facility]-[place].org Website: https://[facility]-[place].org/oursaviourshousing/     11  [facility] Distance: 2.73 miles      In-Person   [address] [place] [address]  Language: English  Hours: Mon - Fri 4:00 PM - 9:00 AM  Fees: Free    Phone: (937) 971-8897 Email: alison@[facility].[employer].org Website: https://[facility].org/[facility]er/          Important Numbers & Websites       Emergency Services   911  [facility] Services   311  Poison Control   (539) 724-9002  Suicide Prevention Lifeline   (322) 425-3666 (TALK)  Child Abuse Hotline   (291) 673-7135 (4-A-Child)  Sexual Assault Hotline   (925) 270-6078 (HOPE)  National Runaway Safeline   (682) 343-1935 (RUNAWAY)  All-Options Talkline   (451) 945-3427  Substance Abuse Referral   (728) 653-2696 (HELP)

## 2023-11-08 ENCOUNTER — OFFICE VISIT (OUTPATIENT)
Dept: FAMILY MEDICINE | Facility: CLINIC | Age: 28
End: 2023-11-08
Payer: COMMERCIAL

## 2023-11-08 VITALS
TEMPERATURE: 97.8 F | BODY MASS INDEX: 38.29 KG/M2 | HEART RATE: 84 BPM | WEIGHT: 258.5 LBS | HEIGHT: 69 IN | SYSTOLIC BLOOD PRESSURE: 146 MMHG | RESPIRATION RATE: 12 BRPM | DIASTOLIC BLOOD PRESSURE: 91 MMHG | OXYGEN SATURATION: 100 %

## 2023-11-08 DIAGNOSIS — Z31.41 FERTILITY TESTING: ICD-10-CM

## 2023-11-08 DIAGNOSIS — D56.3 THALASSEMIA TRAIT, ALPHA: ICD-10-CM

## 2023-11-08 DIAGNOSIS — Z13.9 ENCOUNTER FOR SCREENING INVOLVING SOCIAL DETERMINANTS OF HEALTH (SDOH): ICD-10-CM

## 2023-11-08 DIAGNOSIS — Z11.3 ROUTINE SCREENING FOR STI (SEXUALLY TRANSMITTED INFECTION): ICD-10-CM

## 2023-11-08 DIAGNOSIS — Z00.00 ROUTINE GENERAL MEDICAL EXAMINATION AT A HEALTH CARE FACILITY: Primary | ICD-10-CM

## 2023-11-08 DIAGNOSIS — Z23 NEED FOR VACCINATION: ICD-10-CM

## 2023-11-08 DIAGNOSIS — I10 ESSENTIAL HYPERTENSION: ICD-10-CM

## 2023-11-08 LAB
BASOPHILS # BLD AUTO: 0 10E3/UL (ref 0–0.2)
BASOPHILS NFR BLD AUTO: 0 %
EOSINOPHIL # BLD AUTO: 0 10E3/UL (ref 0–0.7)
EOSINOPHIL NFR BLD AUTO: 0 %
ERYTHROCYTE [DISTWIDTH] IN BLOOD BY AUTOMATED COUNT: 17.8 % (ref 10–15)
HCT VFR BLD AUTO: 38.6 % (ref 40–53)
HGB BLD-MCNC: 12.5 G/DL (ref 13.3–17.7)
IMM GRANULOCYTES # BLD: 0 10E3/UL
IMM GRANULOCYTES NFR BLD: 0 %
LYMPHOCYTES # BLD AUTO: 2.1 10E3/UL (ref 0.8–5.3)
LYMPHOCYTES NFR BLD AUTO: 44 %
MCH RBC QN AUTO: 22.3 PG (ref 26.5–33)
MCHC RBC AUTO-ENTMCNC: 32.4 G/DL (ref 31.5–36.5)
MCV RBC AUTO: 69 FL (ref 78–100)
MONOCYTES # BLD AUTO: 0.4 10E3/UL (ref 0–1.3)
MONOCYTES NFR BLD AUTO: 8 %
NEUTROPHILS # BLD AUTO: 2.3 10E3/UL (ref 1.6–8.3)
NEUTROPHILS NFR BLD AUTO: 48 %
PLATELET # BLD AUTO: 199 10E3/UL (ref 150–450)
RBC # BLD AUTO: 5.61 10E6/UL (ref 4.4–5.9)
T VAGINALIS DNA SPEC QL NAA+PROBE: NOT DETECTED
WBC # BLD AUTO: 4.7 10E3/UL (ref 4–11)

## 2023-11-08 PROCEDURE — 36415 COLL VENOUS BLD VENIPUNCTURE: CPT | Performed by: FAMILY MEDICINE

## 2023-11-08 PROCEDURE — 90471 IMMUNIZATION ADMIN: CPT | Performed by: FAMILY MEDICINE

## 2023-11-08 PROCEDURE — 87491 CHLMYD TRACH DNA AMP PROBE: CPT | Performed by: FAMILY MEDICINE

## 2023-11-08 PROCEDURE — 90480 ADMN SARSCOV2 VAC 1/ONLY CMP: CPT | Performed by: FAMILY MEDICINE

## 2023-11-08 PROCEDURE — 87661 TRICHOMONAS VAGINALIS AMPLIF: CPT | Performed by: FAMILY MEDICINE

## 2023-11-08 PROCEDURE — 86780 TREPONEMA PALLIDUM: CPT | Performed by: FAMILY MEDICINE

## 2023-11-08 PROCEDURE — 84403 ASSAY OF TOTAL TESTOSTERONE: CPT | Performed by: FAMILY MEDICINE

## 2023-11-08 PROCEDURE — 90686 IIV4 VACC NO PRSV 0.5 ML IM: CPT | Performed by: FAMILY MEDICINE

## 2023-11-08 PROCEDURE — 99395 PREV VISIT EST AGE 18-39: CPT | Mod: 25 | Performed by: FAMILY MEDICINE

## 2023-11-08 PROCEDURE — 85025 COMPLETE CBC W/AUTO DIFF WBC: CPT | Performed by: FAMILY MEDICINE

## 2023-11-08 PROCEDURE — 87591 N.GONORRHOEAE DNA AMP PROB: CPT | Performed by: FAMILY MEDICINE

## 2023-11-08 PROCEDURE — 99213 OFFICE O/P EST LOW 20 MIN: CPT | Mod: 25 | Performed by: FAMILY MEDICINE

## 2023-11-08 PROCEDURE — 91320 SARSCV2 VAC 30MCG TRS-SUC IM: CPT | Performed by: FAMILY MEDICINE

## 2023-11-08 PROCEDURE — 87389 HIV-1 AG W/HIV-1&-2 AB AG IA: CPT | Performed by: FAMILY MEDICINE

## 2023-11-08 RX ORDER — AMLODIPINE BESYLATE 10 MG/1
10 TABLET ORAL DAILY
Qty: 90 TABLET | Refills: 1 | Status: SHIPPED | OUTPATIENT
Start: 2023-11-08

## 2023-11-08 ASSESSMENT — ENCOUNTER SYMPTOMS
PARESTHESIAS: 0
FREQUENCY: 0
MYALGIAS: 0
HEARTBURN: 0
JOINT SWELLING: 0
HEADACHES: 0
NAUSEA: 0
DYSURIA: 0
FEVER: 0
EYE PAIN: 0
ABDOMINAL PAIN: 0
CONSTIPATION: 0
NERVOUS/ANXIOUS: 0
HEMATOCHEZIA: 0
SHORTNESS OF BREATH: 0
COUGH: 0
DIARRHEA: 0
WEAKNESS: 0
CHILLS: 0
HEMATURIA: 0
SORE THROAT: 0
ARTHRALGIAS: 0
DIZZINESS: 0

## 2023-11-08 ASSESSMENT — PAIN SCALES - GENERAL: PAINLEVEL: NO PAIN (0)

## 2023-11-08 NOTE — PROGRESS NOTES
"SUBJECTIVE:   CC: Sofi is an 27 year old who presents for preventative health visit.       2023     1:08 PM   Additional Questions   Roomed by Edy Forbes       Healthy Habits:     Getting at least 3 servings of Calcium per day:  NO    Bi-annual eye exam:  NO    Dental care twice a year:  Yes    Sleep apnea or symptoms of sleep apnea:  None    Diet:  Regular (no restrictions)    Frequency of exercise:  2-3 days/week    Duration of exercise:  45-60 minutes    Taking medications regularly:  Yes    Medication side effects:  None    Additional concerns today:  No    Has watery semen   He is wondering if he can have children     Had a lump on his scrotum a few days ago   It is improved   Thinks it was an ingrown hair     Thalassemia   Blood pressure       Social History     Tobacco Use    Smoking status: Former     Types: Cigarettes     Quit date:      Years since quittin.8    Smokeless tobacco: Never   Substance Use Topics    Alcohol use: Yes     Alcohol/week: 4.0 - 5.0 standard drinks of alcohol     Types: 4 - 5 Standard drinks or equivalent per week             2023     3:15 PM   Alcohol Use   Prescreen: >3 drinks/day or >7 drinks/week? No          No data to display                Last PSA: No results found for: \"PSA\"    Reviewed orders with patient. Reviewed health maintenance and updated orders accordingly - Yes    Reviewed and updated as needed this visit by clinical staff   Tobacco  Allergies  Meds   Med Hx  Surg Hx  Fam Hx  Soc Hx        Reviewed and updated as needed this visit by Provider   Tobacco     Med Hx  Surg Hx  Fam Hx  Soc Hx         Review of Systems   Constitutional:  Negative for chills and fever.   HENT:  Negative for congestion, ear pain, hearing loss and sore throat.    Eyes:  Negative for pain and visual disturbance.   Respiratory:  Negative for cough and shortness of breath.    Cardiovascular:  Negative for chest pain and peripheral edema.   Gastrointestinal:  " "Negative for abdominal pain, constipation, diarrhea, heartburn, hematochezia and nausea.   Genitourinary:  Positive for impotence and penile discharge. Negative for dysuria, frequency, genital sores, hematuria and urgency.   Musculoskeletal:  Negative for arthralgias, joint swelling and myalgias.   Skin:  Negative for rash.   Neurological:  Negative for dizziness, weakness, headaches and paresthesias.   Psychiatric/Behavioral:  Negative for mood changes. The patient is not nervous/anxious.      OBJECTIVE:   BP (!) 146/91 (BP Location: Right arm, Patient Position: Sitting, Cuff Size: Adult Large)   Pulse 84   Temp 97.8  F (36.6  C) (Temporal)   Resp 12   Ht 1.743 m (5' 8.62\")   Wt 117.3 kg (258 lb 8 oz)   SpO2 100%   BMI 38.60 kg/m      Physical Exam  Constitutional:       General: He is not in acute distress.     Appearance: Normal appearance.   HENT:      Head: Normocephalic.      Right Ear: Tympanic membrane, ear canal and external ear normal.      Left Ear: Tympanic membrane, ear canal and external ear normal.      Mouth/Throat:      Mouth: Mucous membranes are moist.      Pharynx: No oropharyngeal exudate or posterior oropharyngeal erythema.   Eyes:      General: No scleral icterus.  Cardiovascular:      Rate and Rhythm: Normal rate and regular rhythm.      Heart sounds: No murmur heard.  Pulmonary:      Effort: Pulmonary effort is normal. No respiratory distress.      Breath sounds: Normal breath sounds.   Abdominal:      General: Abdomen is flat. Bowel sounds are normal.      Palpations: Abdomen is soft.   Lymphadenopathy:      Cervical: No cervical adenopathy.   Neurological:      General: No focal deficit present.      Mental Status: He is alert.   Psychiatric:         Mood and Affect: Mood normal.         Behavior: Behavior normal.           ASSESSMENT/PLAN:       ICD-10-CM    1. Encounter for screening involving social determinants of health (SDoH)  Z13.9       2. Routine general medical examination " "at a health care facility  Z00.00       3. Fertility testing  Z31.41 Semen Analysis, Strict Morphology (TRUPTI)     Testosterone, total     Testosterone, total      4. Routine screening for STI (sexually transmitted infection)  Z11.3 Treponema Abs w Reflex to RPR and Titer     HIV Antigen Antibody Combo Cascade     Chlamydia trachomatis/Neisseria gonorrhoeae by PCR     Trichomonas vaginalis DNA PCR     HIV Antigen Antibody Combo     Chlamydia trachomatis/Neisseria gonorrhoeae by PCR - Clinic Collect     Treponema Abs w Reflex to RPR and Titer     Trichomonas vaginalis DNA PCR     Chlamydia trachomatis/Neisseria gonorrhoeae by PCR     HIV Antigen Antibody Combo Cascade     Treponema Abs w Reflex to RPR and Titer      5. Thalassemia trait, alpha  D56.3 CBC with platelets and differential     CBC with platelets and differential      6. Essential hypertension  I10 amLODIPine (NORVASC) 10 MG tablet      7. Need for vaccination  Z23 COVID-19 12+ (2023-24) (PFIZER)     INFLUENZA VACCINE IM > 6 MONTHS VALENT IIV4 (AFLURIA/FLUZONE)          Patient has been advised of split billing requirements and indicates understanding: Yes      COUNSELING:   Reviewed preventive health counseling, as reflected in patient instructions      BMI:   Estimated body mass index is 38.6 kg/m  as calculated from the following:    Height as of this encounter: 1.743 m (5' 8.62\").    Weight as of this encounter: 117.3 kg (258 lb 8 oz).   Weight management plan: Discussed healthy diet and exercise guidelines      He reports that he quit smoking about 2 years ago. His smoking use included cigarettes. He has never used smokeless tobacco.      Pierre Epperson DO  [facility]  "

## 2023-11-08 NOTE — PATIENT INSTRUCTIONS
[phone] [place] radiology scheduling for your renal ultrasound with doppler ordered by Dr Segovia.      Get 8-9am blood work done     Amlodipine-  take a 1/2 pill for 8 days, then increase to a full pill a day.           Preventive Health Recommendations  Male Ages 26 - 39    Yearly exam:             See your health care provider every year in order to  o   Review health changes.   o   Discuss preventive care.    o   Review your medicines if your doctor has prescribed any.  You should be tested each year for STDs (sexually transmitted diseases), if you re at risk.   After age 35, talk to your provider about cholesterol testing. If you are at risk for heart disease, have your cholesterol tested at least every 5 years.   If you are at risk for diabetes, you should have a diabetes test (fasting glucose).  Shots: Get a flu shot each year. Get a tetanus shot every 10 years.     Nutrition:  Eat at least 5 servings of fruits and vegetables daily.   Eat whole-grain bread, whole-wheat pasta and brown rice instead of white grains and rice.   Get adequate Calcium and Vitamin D.     Lifestyle  Exercise for at least 150 minutes a week (30 minutes a day, 5 days a week). This will help you control your weight and prevent disease.   Limit alcohol to one drink per day.   No smoking.   Wear sunscreen to prevent skin cancer.   See your dentist every six months for an exam and cleaning.

## 2023-11-08 NOTE — COMMUNITY RESOURCES LIST (ENGLISH)
11/08/2023   [facility]  N/A  For questions about this resource list or additional care needs, please contact your primary care clinic or care manager.  Phone: [phone]   Email: N/A   Address: [address] [place] [address]   Hours: N/A        Hotlines and Helplines       Hotline - Housing crisis  1  [facility] Distance: 1.32 miles      Phone/Virtual   [address] [place] [address]  Language: English  Hours: Mon - Sun Open 24 Hours   Phone: (380) 713-1693 Email: communications@[facility].org Website: https://[facility]-[place].org/oursaviourshousing/     2  [facility] Distance: 2.06 miles      Phone/Virtual   [address] [place] [address]  Language: English  Hours: Mon - Sun Open 24 Hours   Phone: (434) 875-1435 Email: info@[facility].org Website: http://www.[facility].org          Housing       Coordinated Entry access point  3  [facility] [place] ([facility] - [facility] Services Distance: 1.24 miles      In-Person   [address] [place] [address]  Language: English  Hours: Mon - Fri 9:00 AM - 5:00 PM  Fees: Free   Phone: (596) 412-4123 Email: housing@[facility].org Website: http://www.[facility].org/housing     4  [facility] - Adult [facility] Distance: 2.35 miles      In-Person   [address] [place] [address]  Language: English  Hours: Mon - Sat 10:00 PM - 5:00 PM  Fees: Free   Phone: (135) 227-5887 Email: info@[facility].[employer] Website: http://www.[facility].org/     Drop-in center or day shelter  5  [facility] Distance: 1.66 miles      In-Person   [address] [place] [address]  Language: English  Hours: Mon - Fri 12:00 PM - 3:00 PM  Fees: Free   Phone: (677) 932-8797 Email: [email or web address] Website: http://[place].org/     6  [facility] Distance: 1.76 miles      In-Person   [address], [place] [address]  Language: English, Djiboutian, Lao  Hours: Mon - Sat 7:00 AM - 3:00 PM  Fees: Free, Self Pay   Phone: (265) 230-4750 Email: info@[employer] company Website: https://www.[employer].org/locations/opportunity-center/     Housing search assistance  7  [facility] - Office of Multicultural Services Distance: 1.12 miles      Phone/Virtual   [address] [place] [address]  Language: American Sign Language, Kyrgyz, Syriac, English, Faroese, [place], Oromo, Cambodian, Djiboutian, Lao, Swahili, Comoran, Nepali  Hours: Mon - Tue 9:00 AM - 4:00 PM , Wed 10:00 AM - 5:00 PM , Thu - Fri 9:00 AM - 4:00 PM  Fees: Free   Phone: (566) 391-8466 Email: oms@[facility] Website: http://www.[facility]/residents/human-services/multi-cultural-services     8  [facility] Distance: 1.54 miles      In-Person   [address] [place] [address]  Language: English, Djiboutian, Lao  Hours: Mon - Fri 8:30 AM - 4:30 PM  Fees: Free   Phone: (518) 336-6252 Email: priti@[facility].org Website: http://www.[facility].org/     Shelter for families  9  [facility] Distance: 19.85 miles      In-Person   [address] [place] [address]  Language: English  Hours: Mon - Fri 3:00 PM - 9:00 AM , Sat - Sun Open 24 Hours  Fees: Free   Phone: (193) 314-9528 Ext.1 Website: https://www.[facility].org/2020/07/03/emergency-family-shelter/     Shelter for individuals  10  [facility] [place] [facility]\"" Distance: 1.32 miles      In-Person   [address] [place] [address]  Language: English  Hours: Mon - Sun Open 24 Hours  Fees: Free   Phone: (467) 629-7030 Email: communications@[facility].org Website: https://[facility].org/oursaviourshousing/     11  [facility] Distance: 2.73 miles      In-Person   [address] [place] [address]  Language: English  Hours: Mon - Fri 4:00 PM - 9:00 AM  Fees: Free    Phone: (155) 293-1417 Email: alison@[facility].[employer].org Website: https://[facility].org/[facility]/          Important Numbers & Websites       Emergency Services   911  [facility]   311  Poison Control   (501) 148-7377  Suicide Prevention Lifeline   (281) 590-6831 (TALK)  Child Abuse Hotline   (132) 431-5511 (4-A-Child)  Sexual Assault Hotline   (292) 508-7491 (HOPE)  National Runaway Safeline   (490) 680-5695 (RUNAWAY)  All-Options Talkline   (639) 172-4130  Substance Abuse Referral   (189) 302-4808 (HELP)

## 2023-11-09 ENCOUNTER — TELEPHONE (OUTPATIENT)
Dept: FAMILY MEDICINE | Facility: CLINIC | Age: 28
End: 2023-11-09
Payer: COMMERCIAL

## 2023-11-09 DIAGNOSIS — A74.9 CHLAMYDIA INFECTION: Primary | ICD-10-CM

## 2023-11-09 LAB
C TRACH DNA SPEC QL PROBE+SIG AMP: POSITIVE
HIV 1+2 AB+HIV1 P24 AG SERPL QL IA: NONREACTIVE
N GONORRHOEA DNA SPEC QL NAA+PROBE: NEGATIVE
T PALLIDUM AB SER QL: NONREACTIVE

## 2023-11-09 RX ORDER — DOXYCYCLINE HYCLATE 100 MG
100 TABLET ORAL 2 TIMES DAILY
Qty: 14 TABLET | Refills: 0 | Status: SHIPPED | OUTPATIENT
Start: 2023-11-09 | End: 2023-11-16

## 2023-11-09 NOTE — TELEPHONE ENCOUNTER
Pt calling following up on test results,   Chlamydia is positive please advise.   Thanks,   Aryan Epperson RN  [facility]

## 2023-11-10 ENCOUNTER — VIRTUAL VISIT (OUTPATIENT)
Dept: PSYCHOLOGY | Facility: CLINIC | Age: 28
End: 2023-11-10
Payer: COMMERCIAL

## 2023-11-10 DIAGNOSIS — F43.21 ADJUSTMENT DISORDER WITH DEPRESSED MOOD: Primary | ICD-10-CM

## 2023-11-10 LAB — TESTOST SERPL-MCNC: 605 NG/DL (ref 240–950)

## 2023-11-10 PROCEDURE — 90837 PSYTX W PT 60 MINUTES: CPT | Mod: VID | Performed by: COUNSELOR
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
## 2023-11-21 ENCOUNTER — VIRTUAL VISIT (OUTPATIENT)
Dept: PSYCHOLOGY | Facility: CLINIC | Age: 28
End: 2023-11-21
Payer: COMMERCIAL

## 2023-11-21 DIAGNOSIS — F43.21 ADJUSTMENT DISORDER WITH DEPRESSED MOOD: Primary | ICD-10-CM

## 2023-11-21 PROCEDURE — 90837 PSYTX W PT 60 MINUTES: CPT | Mod: VID | Performed by: COUNSELOR

## 2023-11-21 NOTE — PROGRESS NOTES
[facility]                                     Progress Note    Patient Name: Sofi Escobedo  Date: 11/21/23         Service Type: Individual      Session Start Time: 10:02 pm  Session End Time: 10:56 pm     Session Length: 54 minutes    Session #: 19    Attendees: Client attended alone    Service Modality:  Video Visit:      Provider verified identity through the following two step process.  Patient provided:  Patient is known previously to provider    Telemedicine Visit: The patient's condition can be safely assessed and treated via synchronous audio and visual telemedicine encounter.      Reason for Telemedicine Visit: Services only offered telehealth    Originating Site (Patient Location): Patient's home    Distant Site (Provider Location): Provider Remote Setting- Home Office    Consent:  The patient/guardian has verbally consented to: the potential risks and benefits of telemedicine (video visit) versus in person care; bill my insurance or make self-payment for services provided; and responsibility for payment of non-covered services.     Patient would like the video invitation sent by:  My Chart    Mode of Communication:  Video Conference via [employer]well    Distant Location (Provider):  On-site    As the provider I attest to compliance with applicable laws and regulations related to telemedicine.    DATA  Interactive Complexity: No  Crisis: No  Extended Session (53+ minutes): PROLONGED SERVICE IN THE OUTPATIENT SETTING REQUIRING DIRECT (FACE-TO-FACE) PATIENT CONTACT BEYOND THE USUAL SERVICE:    - Patient's presenting concerns require more intensive intervention than could be completed within the usual service     Progress Since Last Session (Related to Symptoms / Goals / Homework):   Symptoms: Improving pt endorses feeling more focused on himself and hopeful    Homework: Achieved / completed to satisfaction      Episode of Care Goals: Satisfactory progress - PREPARATION (Decided to  "change - considering how); Intervened by negotiating a change plan and determining options / strategies for behavior change, identifying triggers, exploring social supports, and working towards setting a date to begin behavior change     Current / Ongoing Stressors and Concerns:   Pt is currently seeking therapy due to ongoing anxiety/depression and to better manage his temper. Since last session, pt reported that he has been feeling better with some good news. Pt got a new phone, which was something he couldn't afford before, and found a new apartment that is in his price range which is everything he has been looking for. Pt is excited about this and is able to move in early next year. Pt had a good convo with his ex, which was a comfortable friend zone space to talk in and she even commented on pt being more vulnerable (in a good way) in their conversation. Pt processed through his relationship situation. Pt reflected that he wants to be a \"one woman\" man in the end, though still doesn't feel that he is ready for that yet. Pt recognizes that having to be more explicit in his expectation/standard of being exclusive is important, though still feels like a risk for him to have that conversation. Pt verbalized that his biggest barriers to being more up front with this is lingering trust issues from his relationship with his mom and fear of feeling rejected if it doesn't work out. Furthermore, pt discussed how pinky he meets w woman he is attracted to, he finds himself constructing a narrative about how their future could be together, which makes the idea of being rejected evolve into a fear of losing the potential future he's made up, which makes this conversation about being monogamous feel even bigger. Pt confirmed that this was putting a lot of emotional weight onto someone that he is just getting to know and was not helpful. Pt acknowledged that he has been trying to focus more on himself which has been helpful in " figuring out what he really wants and understand why he does what he does. Pt expressed wanting to work on being more focused on the hear and now, instead on his potential future, as he thinks he get caught up in it. Pt has plans to spend time with a friend for his upcoming birthday and is uncertain if he will be doing anything for the upcoming holiday, though knows he won't be going back to [facility] as he doesn't want to deal with family. Session went longer then anticipated due presenting concerns needing more time to address.     Personal Goals:  Good Credit   license  House  Be with a partner that is invested in pt and is financially independent     Treatment Objective(s) Addressed in This Session:   identify and compliment positives at least 3 times daily to support positive self-image  identify triggers, thoughts, and current stressors which contribute to feelings of anxiety  identify patterns of escalation  (i.e. tightness in chest, flushed face, increased heart rate, clenched hands, etc.)  identify at least 3 techniques for intervening on the escalation  use cognitive strategies identified in therapy to challenge anxious thoughts  Decrease frequency and intensity of feeling down, depressed, hopeless  Identify negative self-talk and behaviors: challenge core beliefs, myths, and actions  identify two areas of life that you would like to have improved functioning  identify at least 3 self-care activities     Intervention:   CBT: Reviewed recently behavior patterns and reinforcing cycle  Motivational Interviewing    MI Intervention: Expressed Empathy/Understanding, Supported Autonomy, Collaboration, Evocation, Open-ended questions, Reflections: simple and complex, Change talk (evoked), and Reframe     Change Talk Expressed by the Patient: Desire to change Ability to change Reasons to change Need to change Committment to change Activation Taking steps    Provider Response to Change Talk: E - Evoked  more info from patient about behavior change, A - Affirmed patient's thoughts, decisions, or attempts at behavior change, R - Reflected patient's change talk, and S - Summarized patient's change talk statements    Psychodynamic: Processed through internal-experiences related to anger and frustration management  Solution Focused: Identified immediate areas of concern and potential barriers to success in relationships    Assessments completed prior to visit:  PHQ2:       9/26/2023     9:48 AM 9/19/2023     9:30 AM 6/19/2023     4:01 PM 6/13/2023     8:13 AM 3/7/2023    11:25 AM 3/7/2023    11:24 AM   PHQ-2 ( 1999 [employer])   Q1: Little interest or pleasure in doing things 1 1 0 1 1 1   Q2: Feeling down, depressed or hopeless 1 1 0 0 0 0   PHQ-2 Score 2 2 0 1 1 1   Q1: Little interest or pleasure in doing things Several days Several days Not at all Several days Several days Several days   Q2: Feeling down, depressed or hopeless Several days Several days Not at all Not at all Not at all Not at all   PHQ-2 Score 2 2 0 1 1 1     PHQ9:       4/12/2023     4:37 PM   PHQ-9 SCORE   PHQ-9 Total Score MyChart 5 (Mild depression)   PHQ-9 Total Score 5     GAD2:       4/12/2023     4:53 PM 7/18/2023     8:25 AM 10/17/2023     9:53 AM 10/30/2023    12:07 PM   BISHOP-2   Feeling nervous, anxious, or on edge 1 0    0 1    1 1   Not being able to stop or control worrying 1 0    0 1    1 1   BISHOP-2 Total Score 2 0    0 2    2 2        ASSESSMENT: Current Emotional / Mental Status (status of significant symptoms):   Risk status (Self / Other harm or suicidal ideation)   Patient denies current fears or concerns for personal safety.   Patient denies current or recent suicidal ideation or behaviors.   Patient denies current or recent homicidal ideation or behaviors.   Patient denies current or recent self injurious behavior or ideation.   Patient denies other safety concerns.   Patient reports there has been no change in risk factors since  their last session.     Patient reports there has been no change in protective factors since their last session.     Recommended that patient call 911 or go to the local ED should there be a change in any of these risk factors.     Appearance:   Appropriate    Eye Contact:   Good    Psychomotor Behavior: Normal    Attitude:   Cooperative  Interested Friendly Pleasant   Orientation:   All   Speech    Rate / Production: Normal/ Responsive Talkative    Volume:  Normal    Mood:    Depressed  Normal   Affect:    Appropriate    Thought Content:  Clear    Thought Form:  Coherent  Logical    Insight:    Good  and Intellectual Insight     Medication Review:   No current psychiatric medications prescribed     Medication Compliance:   NA     Changes in Health Issues:   None reported     Chemical Use Review:   Substance Use: Chemical use reviewed, no active concerns identified      Tobacco Use: No change in amount of tobacco use since last session.  Provided encouragement to quit   Patient declined discussion at this time    Diagnosis:  1. Adjustment disorder with depressed mood        Collateral Reports Completed:   Not Applicable    PLAN: (Patient Tasks / Therapist Tasks / Other)  Continue to engage in positive self-talk and self-compassion  Self-advocate in relationships  Utilize self-care to support focusing on yourself  Work on accepting or taking people as they are    Judd Clemons [facility]                                                         ______________________________________________________________________    Individual Treatment Plan    Patient's Name: Sofi Escobedo  YOB: 1995    Date of Creation: 4/26/23  Date Treatment Plan Last Reviewed/Revised: 11/10/23    DSM5 Diagnoses: Adjustment Disorders  309.0 (F43.21) With depressed mood  Psychosocial / Contextual Factors: Pt recently moved to [place] from [place] and has been dealing with the culture shift. Pt has also been working on trying to get  himself established here which has been challenging.  PROMIS (reviewed every 90 days):   PROMIS-10 Scores        7/18/2023     8:26 AM 10/17/2023     9:55 AM 10/30/2023    12:08 PM   PROMIS-10 Total Score w/o Sub Scores   PROMIS TOTAL - SUBSCORES 33    33 26    26 32       Referral / Collaboration:  Referral to another professional/service is not indicated at this time..    Anticipated number of session for this episode of care: 9-12 sessions  Anticipation frequency of session: Biweekly  Anticipated Duration of each session: 38-52 minutes  Treatment plan will be reviewed in 90 days or when goals have been changed.       MeasurableTreatment Goal(s) related to diagnosis / functional impairment(s)  Goal 1: Patient will better manage his anger/frustration, not getting as easily aggravated, and not letting comments get to him as easily.    I will know I've met my goal when I am able to not let others get to me so much.      Objective #A (Patient Action)    Patient will identify triggers, thoughts, and current stressors which contribute to feelings of anxiety  identify patterns of escalation  (i.e. tightness in chest, flushed face, increased heart rate, clenched hands, etc.)  identify at least 3 techniques for intervening on the escalation  use cognitive strategies identified in therapy to challenge anxious thoughts  Increase interest, engagement, and pleasure in doing things  Decrease frequency and intensity of feeling down, depressed, hopeless  Identify negative self-talk and behaviors: challenge core beliefs, myths, and actions  Improve concentration, focus, and mindfulness in daily activities   identify 3 coping strategies for improving mood  learn at least 3 self-regulation strategies.  Status: Continued - Date(s): 11/10/23    Intervention(s)  Therapist will assign homework related to target objective with the intent to promote pt autonomy and better manage MH.  Facilitate increase in self-awareness  Provide  psycho-education on emotion identification/regulation and coping skills  Teach/promote utilization of mindfulness skills and grounding    Goal 2: Patient will feel worthy and be able to find validation internally.     I will know I've met my goal when able to feel more complete or worthy without needing it externally.      Objective #A (Patient Action)    Status: Continued - Date(s): 11/10/23    Patient will identify and compliment positives at least 3 times daily to support positive self-image  Decrease frequency and intensity of feeling down, depressed, hopeless  Identify negative self-talk and behaviors: challenge core beliefs, myths, and actions  identify 5 traits or charcteristics you like about yourself  identify at least 3 self-care activities.    Intervention(s)  Therapist will assign homework related to target objective with the intent to promote pt autonomy and better manage MH.  Facilitate increase in self-awareness  Provide psycho-education on self-care/compassion and narrative therapy interventions  Teach/promote utilization of reframing and boundary setting    Objective #B Being more authentic with myself and others through improving my self-esteem.   Patient will participate in social activities to improve mood  learn & utilize at least 3 assertive communication skills weekly  identify 5 traits or charcteristics you like about yourself  identify at least 3 adaptive coping statements to counteract negative thoughts.    Status: Continued - Date(s): 11/10/23    Intervention(s)  Therapist will assign homework related to target objective with the intent to promote pt autonomy and better manage MH.  Facilitate increase in self-awareness  Provide psycho-education on self-esteem building and self-exploration  Teach/promote utilization of positive self-affirmations and critical observation skills    Patient has reviewed and agreed to the above plan.      LADONNA Paez  April 26, 2023

## 2023-12-13 ENCOUNTER — VIRTUAL VISIT (OUTPATIENT)
Dept: PSYCHOLOGY | Facility: CLINIC | Age: 28
End: 2023-12-13
Payer: COMMERCIAL

## 2023-12-13 DIAGNOSIS — F43.21 ADJUSTMENT DISORDER WITH DEPRESSED MOOD: Primary | ICD-10-CM

## 2023-12-13 DIAGNOSIS — F10.10 ALCOHOL ABUSE: ICD-10-CM

## 2023-12-13 PROCEDURE — 90837 PSYTX W PT 60 MINUTES: CPT | Mod: VID | Performed by: COUNSELOR

## 2023-12-13 NOTE — PROGRESS NOTES
[facility]                                     Progress Note    Patient Name: Sofi Escobedo  Date: 12/13/23         Service Type: Individual      Session Start Time: 5:00 pm  Session End Time: 6:00 pm     Session Length: 60 minutes    Session #: 20    Attendees: Client attended alone    Service Modality:  Video Visit:      Provider verified identity through the following two step process.  Patient provided:  Patient is known previously to provider    Telemedicine Visit: The patient's condition can be safely assessed and treated via synchronous audio and visual telemedicine encounter.      Reason for Telemedicine Visit: Services only offered telehealth    Originating Site (Patient Location): Patient's home    Distant Site (Provider Location): Provider Remote Setting- Home Office    Consent:  The patient/guardian has verbally consented to: the potential risks and benefits of telemedicine (video visit) versus in person care; bill my insurance or make self-payment for services provided; and responsibility for payment of non-covered services.     Patient would like the video invitation sent by:  My Chart    Mode of Communication:  Video Conference via [employer]well    Distant Location (Provider):  Off-site    As the provider I attest to compliance with applicable laws and regulations related to telemedicine.    DATA  Interactive Complexity: No  Crisis: No  Extended Session (53+ minutes): PROLONGED SERVICE IN THE OUTPATIENT SETTING REQUIRING DIRECT (FACE-TO-FACE) PATIENT CONTACT BEYOND THE USUAL SERVICE:    - Patient's presenting concerns require more intensive intervention than could be completed within the usual service     Progress Since Last Session (Related to Symptoms / Goals / Homework):   Symptoms: Worsening pt endorses notable increase in MDD    Homework: Partially completed      Episode of Care Goals: Satisfactory progress - PREPARATION (Decided to change - considering how); Intervened  by negotiating a change plan and determining options / strategies for behavior change, identifying triggers, exploring social supports, and working towards setting a date to begin behavior change     Current / Ongoing Stressors and Concerns:   Pt is currently seeking therapy due to ongoing anxiety/depression and to better manage his temper. Pt apologized for missing last session and expressed that Since last session, pt reported that things have been very challenging. Pt went out with friends the day before his birthday, which went well, until he went out to the club afterward where he got blackout drunk. Pt is realizing that he doesn't want to drink that much anymore and needs to stop drinking that much when he goes out. After expressing this pt agreed that completing a VON david may be beneficial to rule out any bigger concerns. Pt acknowledged that he has been feeling really lonely, and has continued to have multiple women in his life, which is not something he feels aligns with his values. Pt questions why he continues to do this and agreed that he may be conflating sex with affection. Pt clarified that he broke up with his ex again, who explicitly told him that she was dating other men now and that she would no longer sleep with him. Pt expressed that this was very upsetting and he begged her to consider giving him another change. Pt was able to acknowledge that his feeling were very complicated, especially given that part of this felt like he was being abandoned again. Pt reflected that he feels like he constantly is engaging in romantic tristis that he isn't actually invested in and that his depression tends to drive him to act impulsively to escape his feelings of loneliness. Pt was tearful when talking about how he is tired of always feeling alone and like his family doesn't care about him. Pt discussed how his work stress is compounding these issues and he is uncertain about being able to meet his  vocational responsibilities given how he can feel overwhelmed at times. Pt discussed what his ultimate goal in life would be and ways to identify how his current behavior patterns are helpful or unhelpful in achieving this. Pt recognizes that he needs to change his behavior patterns as they aren't getting him to where he wants to be and more recently has noticed that his ADL's have started to be impacted by his depression symptoms. Pt is considering FMLA given how his MH is starting to impact his functioning. Session went longer then anticipated due presenting concerns needing more time to address.     Personal Goals:  Good Credit   license  House  Be with a partner that is invested in pt and is financially independent     Treatment Objective(s) Addressed in This Session:   identify and compliment positives at least 3 times daily to support positive self-image  identify triggers, thoughts, and current stressors which contribute to feelings of anxiety  identify patterns of escalation  (i.e. tightness in chest, flushed face, increased heart rate, clenched hands, etc.)  identify at least 3 techniques for intervening on the escalation  use cognitive strategies identified in therapy to challenge anxious thoughts  Decrease frequency and intensity of feeling down, depressed, hopeless  Identify negative self-talk and behaviors: challenge core beliefs, myths, and actions  identify two areas of life that you would like to have improved functioning  identify at least 3 self-care activities     Intervention:   CBT: Reviewed recently behavior patterns and reinforcing cycle  Motivational Interviewing    MI Intervention: Expressed Empathy/Understanding, Supported Autonomy, Collaboration, Evocation, Permission to raise concern or advise, Open-ended questions, Reflections: simple and complex, Change talk (evoked), and Reframe     Change Talk Expressed by the Patient: Desire to change Ability to change Reasons to  change Need to change Committment to change Activation Taking steps    Provider Response to Change Talk: E - Evoked more info from patient about behavior change, A - Affirmed patient's thoughts, decisions, or attempts at behavior change, R - Reflected patient's change talk, and S - Summarized patient's change talk statements    Psychodynamic: Processed through internal-experiences related to anger and frustration management  Solution Focused: Identified immediate areas of concern and potential barriers to success in relationships    Assessments completed prior to visit:  PHQ2:       9/26/2023     9:48 AM 9/19/2023     9:30 AM 6/19/2023     4:01 PM 6/13/2023     8:13 AM 3/7/2023    11:25 AM 3/7/2023    11:24 AM   PHQ-2 ( 1999 [employer])   Q1: Little interest or pleasure in doing things 1 1 0 1 1 1   Q2: Feeling down, depressed or hopeless 1 1 0 0 0 0   PHQ-2 Score 2 2 0 1 1 1   Q1: Little interest or pleasure in doing things Several days Several days Not at all Several days Several days Several days   Q2: Feeling down, depressed or hopeless Several days Several days Not at all Not at all Not at all Not at all   PHQ-2 Score 2 2 0 1 1 1     PHQ9:       4/12/2023     4:37 PM   PHQ-9 SCORE   PHQ-9 Total Score MyChart 5 (Mild depression)   PHQ-9 Total Score 5     GAD2:       4/12/2023     4:53 PM 7/18/2023     8:25 AM 10/17/2023     9:53 AM 10/30/2023    12:07 PM   BISHOP-2   Feeling nervous, anxious, or on edge 1 0    0 1    1 1   Not being able to stop or control worrying 1 0    0 1    1 1   BISHOP-2 Total Score 2 0    0 2    2 2        ASSESSMENT: Current Emotional / Mental Status (status of significant symptoms):   Risk status (Self / Other harm or suicidal ideation)   Patient denies current fears or concerns for personal safety.   Patient denies current or recent suicidal ideation or behaviors.   Patient denies current or recent homicidal ideation or behaviors.   Patient denies current or recent self injurious behavior or  ideation.   Patient denies other safety concerns.   Patient reports there has been no change in risk factors since their last session.     Patient reports there has been no change in protective factors since their last session.     Recommended that patient call 911 or go to the local ED should there be a change in any of these risk factors.     Appearance:   Appropriate    Eye Contact:   Good    Psychomotor Behavior: Normal    Attitude:   Cooperative  Interested Friendly Pleasant   Orientation:   All   Speech    Rate / Production: Normal/ Responsive Talkative    Volume:  Normal    Mood:    Depressed  Normal Sad    Affect:    Appropriate  Tearful   Thought Content:  Clear    Thought Form:  Coherent  Logical    Insight:    Fair  and Intellectual Insight     Medication Review:   No current psychiatric medications prescribed     Medication Compliance:   NA     Changes in Health Issues:   None reported     Chemical Use Review:   Substance Use: Chemical use reviewed, no active concerns identified      Tobacco Use: No change in amount of tobacco use since last session.  Provided encouragement to quit   Patient declined discussion at this time    Diagnosis:  1. Adjustment disorder with depressed mood    2. Alcohol abuse        Collateral Reports Completed:   Not Applicable    PLAN: (Patient Tasks / Therapist Tasks / Other)  Engage in positive self-talk and self-compassion  Work on being honest with yourself and others  Follow-up on VON eval request  Make a list of current behaviors that don't align with your big goals and bring to session next time    Judd Clemons [facility]                                                         ______________________________________________________________________    Individual Treatment Plan    Patient's Name: Sofi Escobedo  YOB: 1995    Date of Creation: 4/26/23  Date Treatment Plan Last Reviewed/Revised: 11/10/23    DSM5 Diagnoses: Adjustment Disorders  309.0  (F43.21) With depressed mood  Psychosocial / Contextual Factors: Pt recently moved to [place] from [place] and has been dealing with the culture shift. Pt has also been working on trying to get himself established here which has been challenging.  PROMIS (reviewed every 90 days):   PROMIS-10 Scores        7/18/2023     8:26 AM 10/17/2023     9:55 AM 10/30/2023    12:08 PM   PROMIS-10 Total Score w/o Sub Scores   PROMIS TOTAL - SUBSCORES 33    33 26    26 32       Referral / Collaboration:  Referral to another professional/service is not indicated at this time..    Anticipated number of session for this episode of care: 9-12 sessions  Anticipation frequency of session: Biweekly  Anticipated Duration of each session: 38-52 minutes  Treatment plan will be reviewed in 90 days or when goals have been changed.       MeasurableTreatment Goal(s) related to diagnosis / functional impairment(s)  Goal 1: Patient will better manage his anger/frustration, not getting as easily aggravated, and not letting comments get to him as easily.    I will know I've met my goal when I am able to not let others get to me so much.      Objective #A (Patient Action)    Patient will identify triggers, thoughts, and current stressors which contribute to feelings of anxiety  identify patterns of escalation  (i.e. tightness in chest, flushed face, increased heart rate, clenched hands, etc.)  identify at least 3 techniques for intervening on the escalation  use cognitive strategies identified in therapy to challenge anxious thoughts  Increase interest, engagement, and pleasure in doing things  Decrease frequency and intensity of feeling down, depressed, hopeless  Identify negative self-talk and behaviors: challenge core beliefs, myths, and actions  Improve concentration, focus, and mindfulness in daily activities   identify 3 coping strategies for improving mood  learn at least 3 self-regulation strategies.  Status: Continued - Date(s):  11/10/23    Intervention(s)  Therapist will assign homework related to target objective with the intent to promote pt autonomy and better manage MH.  Facilitate increase in self-awareness  Provide psycho-education on emotion identification/regulation and coping skills  Teach/promote utilization of mindfulness skills and grounding    Goal 2: Patient will feel worthy and be able to find validation internally.     I will know I've met my goal when able to feel more complete or worthy without needing it externally.      Objective #A (Patient Action)    Status: Continued - Date(s): 11/10/23    Patient will identify and compliment positives at least 3 times daily to support positive self-image  Decrease frequency and intensity of feeling down, depressed, hopeless  Identify negative self-talk and behaviors: challenge core beliefs, myths, and actions  identify 5 traits or charcteristics you like about yourself  identify at least 3 self-care activities.    Intervention(s)  Therapist will assign homework related to target objective with the intent to promote pt autonomy and better manage MH.  Facilitate increase in self-awareness  Provide psycho-education on self-care/compassion and narrative therapy interventions  Teach/promote utilization of reframing and boundary setting    Objective #B Being more authentic with myself and others through improving my self-esteem.   Patient will participate in social activities to improve mood  learn & utilize at least 3 assertive communication skills weekly  identify 5 traits or charcteristics you like about yourself  identify at least 3 adaptive coping statements to counteract negative thoughts.    Status: Continued - Date(s): 11/10/23    Intervention(s)  Therapist will assign homework related to target objective with the intent to promote pt autonomy and better manage MH.  Facilitate increase in self-awareness  Provide psycho-education on self-esteem building and  self-exploration  Teach/promote utilization of positive self-affirmations and critical observation skills    Patient has reviewed and agreed to the above plan.      Judd Clemons, LPCC  April 26, 2023

## 2023-12-19 ENCOUNTER — VIRTUAL VISIT (OUTPATIENT)
Dept: PSYCHOLOGY | Facility: CLINIC | Age: 28
End: 2023-12-19
Payer: COMMERCIAL

## 2023-12-19 DIAGNOSIS — F43.21 ADJUSTMENT DISORDER WITH DEPRESSED MOOD: Primary | ICD-10-CM

## 2023-12-19 PROCEDURE — 90837 PSYTX W PT 60 MINUTES: CPT | Mod: VID | Performed by: COUNSELOR

## 2023-12-19 NOTE — PROGRESS NOTES
[facility]                                     Progress Note    Patient Name: Sofi Escobedo  Date: 12/19/23         Service Type: Individual      Session Start Time: 10:00 am  Session End Time: 11:05 am     Session Length: 65 minutes    Session #: 21    Attendees: Client attended alone    Service Modality:  Video Visit:      Provider verified identity through the following two step process.  Patient provided:  Patient is known previously to provider    Telemedicine Visit: The patient's condition can be safely assessed and treated via synchronous audio and visual telemedicine encounter.      Reason for Telemedicine Visit: Services only offered telehealth    Originating Site (Patient Location): Patient's home    Distant Site (Provider Location): Provider Remote Setting- Home Office    Consent:  The patient/guardian has verbally consented to: the potential risks and benefits of telemedicine (video visit) versus in person care; bill my insurance or make self-payment for services provided; and responsibility for payment of non-covered services.     Patient would like the video invitation sent by:  My Chart    Mode of Communication:  Video Conference via [employer]well    Distant Location (Provider):  Off-site    As the provider I attest to compliance with applicable laws and regulations related to telemedicine.    DATA  Interactive Complexity: No  Crisis: No  Extended Session (53+ minutes): PROLONGED SERVICE IN THE OUTPATIENT SETTING REQUIRING DIRECT (FACE-TO-FACE) PATIENT CONTACT BEYOND THE USUAL SERVICE:    - Patient's presenting concerns require more intensive intervention than could be completed within the usual service     Progress Since Last Session (Related to Symptoms / Goals / Homework):   Symptoms: Worsening pt endorses ongoing in MDD symptoms    Homework: Partially completed      Episode of Care Goals: Satisfactory progress - PREPARATION (Decided to change - considering how); Intervened  "by negotiating a change plan and determining options / strategies for behavior change, identifying triggers, exploring social supports, and working towards setting a date to begin behavior change     Current / Ongoing Stressors and Concerns:   Pt is currently seeking therapy due to ongoing anxiety/depression and to better manage his temper. Pt apologized for missing last session and expressed that Since last session, pt reported that he has been feeling more down this week, having a couple crying episodes and noticing that his sleep has been more disrupted. Pt got into it with his ex again, trying to advocate that they should give him another chance, which she rebuffed. Pt also had a friend lose their dad this past week who invited pt to the , yet this friend is connected to his ex so he isn't sure if he can attend as he doesn't want to be around her. Pt noted that he found himself going out with friends and drinking to help distract himself, but he didn't drink to blacking out like last time. Pt verbalized that he is actively trying not to drink by himself and when he does go out he tried to drink less. Pt did find that going out with his friend helped a little, and yet didn't feel present in the moment as his mind kept going back to his recent break up. Pt processed through his feelings around her and feeling abandoned. Pt reflected that this break up has been really hard and acknowledged that his attachment issues are likely contributing to this. Pt became tearful talking about how he feels he really messed up and that he may have let \"the one\" get away. Pt has been engaging in a lot of negative self-talk, which has helped him recognize that he needs to change his behavior patterns and yet is unsure how to do it. Pt agreed that a big part of them is \"chasing\" things to help him feel better and using things to meet his needs in the moment that don't actually meet these needs long term. Pt discussed how he " can handle this situation and how letting go is important but very hard. Pt was not willing to accept that he may have depression, and yet recognizes that how he is feeling is very much impacting him in ways that he hasn't dealt with to this level for a while. Pt discussed FMLA again and determined that he feels his MH is impacting his ability to reliably do his job. Session went longer then anticipated due presenting concerns needing more time to address.     Personal Goals:  Good Credit   license  House  Be with a partner that is invested in pt and is financially independent     Treatment Objective(s) Addressed in This Session:   identify and compliment positives at least 3 times daily to support positive self-image  identify triggers, thoughts, and current stressors which contribute to feelings of anxiety  identify patterns of escalation  (i.e. tightness in chest, flushed face, increased heart rate, clenched hands, etc.)  identify at least 3 techniques for intervening on the escalation  use cognitive strategies identified in therapy to challenge anxious thoughts  Decrease frequency and intensity of feeling down, depressed, hopeless  Identify negative self-talk and behaviors: challenge core beliefs, myths, and actions  identify two areas of life that you would like to have improved functioning  identify at least 3 self-care activities     Intervention:   CBT: Reviewed recently behavior patterns and reinforcing cycle  Motivational Interviewing    MI Intervention: Expressed Empathy/Understanding, Supported Autonomy, Collaboration, Evocation, Open-ended questions, Reflections: simple and complex, Change talk (evoked), and Reframe     Change Talk Expressed by the Patient: Desire to change Ability to change Reasons to change Need to change Committment to change Activation Taking steps    Provider Response to Change Talk: E - Evoked more info from patient about behavior change, A - Affirmed patient's  thoughts, decisions, or attempts at behavior change, R - Reflected patient's change talk, and S - Summarized patient's change talk statements    Psychodynamic: Processed through internal-experiences related to anger and frustration management  Solution Focused: Identified immediate areas of concern and potential barriers to success in relationships    Assessments completed prior to visit:  PHQ2:       12/19/2023     9:58 AM 9/26/2023     9:48 AM 9/19/2023     9:30 AM 6/19/2023     4:01 PM 6/13/2023     8:13 AM 3/7/2023    11:25 AM 3/7/2023    11:24 AM   PHQ-2 ( 1999 [employer])   Q1: Little interest or pleasure in doing things 1 1 1 0 1 1 1   Q2: Feeling down, depressed or hopeless 1 1 1 0 0 0 0   PHQ-2 Score 2 2 2 0 1 1 1   Q1: Little interest or pleasure in doing things Several days Several days Several days Not at all Several days Several days Several days   Q2: Feeling down, depressed or hopeless Several days Several days Several days Not at all Not at all Not at all Not at all   PHQ-2 Score 2 2 2 0 1 1 1     PHQ9:       4/12/2023     4:37 PM   PHQ-9 SCORE   PHQ-9 Total Score MyChart 5 (Mild depression)   PHQ-9 Total Score 5     GAD2:       4/12/2023     4:53 PM 7/18/2023     8:25 AM 10/17/2023     9:53 AM 10/30/2023    12:07 PM 12/19/2023     9:58 AM   BISHOP-2   Feeling nervous, anxious, or on edge 1 0    0 1    1 1 1   Not being able to stop or control worrying 1 0    0 1    1 1 1   BISHOP-2 Total Score 2 0    0 2    2 2 2        ASSESSMENT: Current Emotional / Mental Status (status of significant symptoms):   Risk status (Self / Other harm or suicidal ideation)   Patient denies current fears or concerns for personal safety.   Patient denies current or recent suicidal ideation or behaviors.   Patient denies current or recent homicidal ideation or behaviors.   Patient denies current or recent self injurious behavior or ideation.   Patient denies other safety concerns.   Patient reports there has been no change in risk  factors since their last session.     Patient reports there has been no change in protective factors since their last session.     Recommended that patient call 911 or go to the local ED should there be a change in any of these risk factors.     Appearance:   Appropriate    Eye Contact:   Good    Psychomotor Behavior: Normal    Attitude:   Cooperative  Interested Friendly Pleasant   Orientation:   All   Speech    Rate / Production: Normal/ Responsive Talkative    Volume:  Normal    Mood:    Depressed  Normal Sad    Affect:    Appropriate  Tearful   Thought Content:  Clear    Thought Form:  Coherent  Logical    Insight:    Fair  and Intellectual Insight     Medication Review:   No current psychiatric medications prescribed     Medication Compliance:   NA     Changes in Health Issues:   None reported     Chemical Use Review:   Substance Use: Chemical use reviewed, no active concerns identified      Tobacco Use: No change in amount of tobacco use since last session.  Provided encouragement to quit   Patient declined discussion at this time    Diagnosis:  1. Adjustment disorder with depressed mood      Collateral Reports Completed:   Not Applicable    PLAN: (Patient Tasks / Therapist Tasks / Other)  Try to engage in positive self-talk and self-compassion  Work on being honest with yourself and others  Follow-up on OVN eval request  Make a list of current behaviors that don't align with your big goals and bring to session next time  Determine what you need to go to let go of your expectations    LADONNA Paez                                                         ______________________________________________________________________    Individual Treatment Plan    Patient's Name: Sofi Escobedo  YOB: 1995    Date of Creation: 4/26/23  Date Treatment Plan Last Reviewed/Revised: 11/10/23    DSM5 Diagnoses: Adjustment Disorders  309.0 (F43.21) With depressed mood  Psychosocial / Contextual  Factors: Pt recently moved to [place] from [place] and has been dealing with the culture shift. Pt has also been working on trying to get himself established here which has been challenging.  PROMIS (reviewed every 90 days):   PROMIS-10 Scores        7/18/2023     8:26 AM 10/17/2023     9:55 AM 10/30/2023    12:08 PM   PROMIS-10 Total Score w/o Sub Scores   PROMIS TOTAL - SUBSCORES 33    33 26    26 32       Referral / Collaboration:  Referral to another professional/service is not indicated at this time..    Anticipated number of session for this episode of care: 9-12 sessions  Anticipation frequency of session: Biweekly  Anticipated Duration of each session: 38-52 minutes  Treatment plan will be reviewed in 90 days or when goals have been changed.       MeasurableTreatment Goal(s) related to diagnosis / functional impairment(s)  Goal 1: Patient will better manage his anger/frustration, not getting as easily aggravated, and not letting comments get to him as easily.    I will know I've met my goal when I am able to not let others get to me so much.      Objective #A (Patient Action)    Patient will identify triggers, thoughts, and current stressors which contribute to feelings of anxiety  identify patterns of escalation  (i.e. tightness in chest, flushed face, increased heart rate, clenched hands, etc.)  identify at least 3 techniques for intervening on the escalation  use cognitive strategies identified in therapy to challenge anxious thoughts  Increase interest, engagement, and pleasure in doing things  Decrease frequency and intensity of feeling down, depressed, hopeless  Identify negative self-talk and behaviors: challenge core beliefs, myths, and actions  Improve concentration, focus, and mindfulness in daily activities   identify 3 coping strategies for improving mood  learn at least 3 self-regulation strategies.  Status: Continued - Date(s): 11/10/23    Intervention(s)  Therapist will assign homework related to target  objective with the intent to promote pt autonomy and better manage MH.  Facilitate increase in self-awareness  Provide psycho-education on emotion identification/regulation and coping skills  Teach/promote utilization of mindfulness skills and grounding    Goal 2: Patient will feel worthy and be able to find validation internally.     I will know I've met my goal when able to feel more complete or worthy without needing it externally.      Objective #A (Patient Action)    Status: Continued - Date(s): 11/10/23    Patient will identify and compliment positives at least 3 times daily to support positive self-image  Decrease frequency and intensity of feeling down, depressed, hopeless  Identify negative self-talk and behaviors: challenge core beliefs, myths, and actions  identify 5 traits or charcteristics you like about yourself  identify at least 3 self-care activities.    Intervention(s)  Therapist will assign homework related to target objective with the intent to promote pt autonomy and better manage MH.  Facilitate increase in self-awareness  Provide psycho-education on self-care/compassion and narrative therapy interventions  Teach/promote utilization of reframing and boundary setting    Objective #B Being more authentic with myself and others through improving my self-esteem.   Patient will participate in social activities to improve mood  learn & utilize at least 3 assertive communication skills weekly  identify 5 traits or charcteristics you like about yourself  identify at least 3 adaptive coping statements to counteract negative thoughts.    Status: Continued - Date(s): 11/10/23    Intervention(s)  Therapist will assign homework related to target objective with the intent to promote pt autonomy and better manage MH.  Facilitate increase in self-awareness  Provide psycho-education on self-esteem building and self-exploration  Teach/promote utilization of positive self-affirmations and critical observation  skills    Patient has reviewed and agreed to the above plan.      Judd Clemons, GILBERTC  April 26, 2023

## 2024-01-11 ENCOUNTER — VIRTUAL VISIT (OUTPATIENT)
Dept: PSYCHOLOGY | Facility: CLINIC | Age: 29
End: 2024-01-11
Payer: COMMERCIAL

## 2024-01-11 DIAGNOSIS — F43.21 ADJUSTMENT DISORDER WITH DEPRESSED MOOD: Primary | ICD-10-CM

## 2024-01-11 PROCEDURE — 90837 PSYTX W PT 60 MINUTES: CPT | Mod: 95 | Performed by: COUNSELOR

## 2024-01-11 ASSESSMENT — PATIENT HEALTH QUESTIONNAIRE - PHQ9
3. TROUBLE FALLING OR STAYING ASLEEP OR SLEEPING TOO MUCH: SEVERAL DAYS
4. FEELING TIRED OR HAVING LITTLE ENERGY: SEVERAL DAYS
SUM OF ALL RESPONSES TO PHQ QUESTIONS 1-9: 9
10. IF YOU CHECKED OFF ANY PROBLEMS, HOW DIFFICULT HAVE THESE PROBLEMS MADE IT FOR YOU TO DO YOUR WORK, TAKE CARE OF THINGS AT HOME, OR GET ALONG WITH OTHER PEOPLE: SOMEWHAT DIFFICULT
10. IF YOU CHECKED OFF ANY PROBLEMS, HOW DIFFICULT HAVE THESE PROBLEMS MADE IT FOR YOU TO DO YOUR WORK, TAKE CARE OF THINGS AT HOME, OR GET ALONG WITH OTHER PEOPLE: SOMEWHAT DIFFICULT
1. LITTLE INTEREST OR PLEASURE IN DOING THINGS: SEVERAL DAYS
2. FEELING DOWN, DEPRESSED, IRRITABLE, OR HOPELESS: SEVERAL DAYS
7. TROUBLE CONCENTRATING ON THINGS, SUCH AS READING THE NEWSPAPER OR WATCHING TELEVISION: SEVERAL DAYS
5. POOR APPETITE OR OVEREATING: SEVERAL DAYS
SUM OF ALL RESPONSES TO PHQ QUESTIONS 1-9: 9
SUM OF ALL RESPONSES TO PHQ QUESTIONS 1-9: 9
9. THOUGHTS THAT YOU WOULD BE BETTER OFF DEAD, OR OF HURTING YOURSELF: SEVERAL DAYS

## 2024-01-11 ASSESSMENT — ANXIETY QUESTIONNAIRES
7. FEELING AFRAID AS IF SOMETHING AWFUL MIGHT HAPPEN: SEVERAL DAYS
GAD7 TOTAL SCORE: 7
GAD7 TOTAL SCORE: 7
4. TROUBLE RELAXING: SEVERAL DAYS
8. IF YOU CHECKED OFF ANY PROBLEMS, HOW DIFFICULT HAVE THESE MADE IT FOR YOU TO DO YOUR WORK, TAKE CARE OF THINGS AT HOME, OR GET ALONG WITH OTHER PEOPLE?: SOMEWHAT DIFFICULT
7. FEELING AFRAID AS IF SOMETHING AWFUL MIGHT HAPPEN: SEVERAL DAYS
1. FEELING NERVOUS, ANXIOUS, OR ON EDGE: SEVERAL DAYS
2. NOT BEING ABLE TO STOP OR CONTROL WORRYING: SEVERAL DAYS
GAD7 TOTAL SCORE: 7
6. BECOMING EASILY ANNOYED OR IRRITABLE: SEVERAL DAYS
5. BEING SO RESTLESS THAT IT IS HARD TO SIT STILL: SEVERAL DAYS
3. WORRYING TOO MUCH ABOUT DIFFERENT THINGS: SEVERAL DAYS

## 2024-01-11 NOTE — PROGRESS NOTES
[facility]                                     Progress Note    Patient Name: Sofi Escobedo  Date: 1/11/24         Service Type: Individual      Session Start Time: 4:02 pm  Session End Time: 4:59 pm     Session Length: 57 minutes    Session #: 22    Attendees: Client attended alone    Service Modality:  Video Visit:      Provider verified identity through the following two step process.  Patient provided:  Patient is known previously to provider    Telemedicine Visit: The patient's condition can be safely assessed and treated via synchronous audio and visual telemedicine encounter.      Reason for Telemedicine Visit: Services only offered telehealth    Originating Site (Patient Location): Patient's home    Distant Site (Provider Location): Provider Remote Setting- Home Office    Consent:  The patient/guardian has verbally consented to: the potential risks and benefits of telemedicine (video visit) versus in person care; bill my insurance or make self-payment for services provided; and responsibility for payment of non-covered services.     Patient would like the video invitation sent by:  My Chart    Mode of Communication:  Video Conference via Amwell    Distant Location (Provider):  Off-site    As the provider I attest to compliance with applicable laws and regulations related to telemedicine.    DATA  Interactive Complexity: No  Crisis: No  Extended Session (53+ minutes): PROLONGED SERVICE IN THE OUTPATIENT SETTING REQUIRING DIRECT (FACE-TO-FACE) PATIENT CONTACT BEYOND THE USUAL SERVICE:    - Patient's presenting concerns require more intensive intervention than could be completed within the usual service     Progress Since Last Session (Related to Symptoms / Goals / Homework):   Symptoms: Worsening pt endorses ongoing MDD symptoms and increased frurstation    Homework: Partially completed      Episode of Care Goals: Satisfactory progress - PREPARATION (Decided to change -  considering how); Intervened by negotiating a change plan and determining options / strategies for behavior change, identifying triggers, exploring social supports, and working towards setting a date to begin behavior change     Current / Ongoing Stressors and Concerns:   Pt is currently seeking therapy due to ongoing anxiety/depression and to better manage his temper. Since last session, pt reported that he moved into his new place and got back with his girl that he had broke up with recently. Pt is liking his new place and yet is finding it to be challenging to settle in recently. Pt has cut off contact with the other girls in his life, trying to focus on just her. Pt did attend his friend's dad  because he got back with her. However, pt now thinks that was a mistake as he is coming to see now that he got back with her for the wrong reasons for him. Pt processed through how this relationship has not been going the way he anticipated and the frustrations he has felt. Pt reflected that much like last time, he is coming to see there is a gap in their maturity levels and having real life experience. Pt was also able to acknowledge that a big part of him wanted to end things on her terms and not on hers, which drove him to get back together with her. Pt talked through how this approach is not helpful and immature, though is rooted in his past experiences of feeling abandoned or less then. Pt identified one of his big frustrations is that she moved in again, without actually talking about it which really irritates him. Furthermore, pt admitted that he has been drinking nearly daily since александр and verbalized recognizing that he needs to follow-up on the VON that called him. Pt clarified that in addition to his relationship stressors, his bio-mom also mentioned to him that he has a family hx of addiction and heart issues, which was really upsetting to him as he thinks she should have told him sooner. Pt had a  "few instances of calling into work due to feeling overwhelmed and noted that he hasn't been going to the gym like he used to. Pt verbalized that he is currently riding out his current relationship to see if it improved and is thinking that if it doesn't by next month he will end it. After this he thinks he will have the energy and space to get back into that routine. Pt denied any active SI at this time, just that there are times he feels overwhelmed and the idea of \"just disappearing\" feels preferable. Session went longer then anticipated due presenting concerns needing more time to address.     Personal Goals:  Good Credit   license  House  Be with a partner that is invested in pt and is financially independent     Treatment Objective(s) Addressed in This Session:   identify and compliment positives at least 3 times daily to support positive self-image  identify triggers, thoughts, and current stressors which contribute to feelings of anxiety  identify patterns of escalation  (i.e. tightness in chest, flushed face, increased heart rate, clenched hands, etc.)  identify at least 3 techniques for intervening on the escalation  use cognitive strategies identified in therapy to challenge anxious thoughts  Decrease frequency and intensity of feeling down, depressed, hopeless  Identify negative self-talk and behaviors: challenge core beliefs, myths, and actions  identify two areas of life that you would like to have improved functioning  identify at least 3 self-care activities     Intervention:   CBT: Reviewed recently behavior patterns and reinforcing cycle  Motivational Interviewing    MI Intervention: Expressed Empathy/Understanding, Supported Autonomy, Collaboration, Evocation, Open-ended questions, Reflections: simple and complex, Change talk (evoked), and Reframe     Change Talk Expressed by the Patient: Desire to change Ability to change Reasons to change Need to change Committment to change " Activation Taking steps    Provider Response to Change Talk: E - Evoked more info from patient about behavior change, A - Affirmed patient's thoughts, decisions, or attempts at behavior change, R - Reflected patient's change talk, and S - Summarized patient's change talk statements    Psychodynamic: Processed through internal-experiences related to anger and frustration management  Solution Focused: Identified immediate areas of concern and potential barriers to success in relationships    Assessments completed prior to visit:  PHQ2:       12/19/2023     9:58 AM 9/26/2023     9:48 AM 9/19/2023     9:30 AM 6/19/2023     4:01 PM 6/13/2023     8:13 AM 3/7/2023    11:25 AM 3/7/2023    11:24 AM   PHQ-2 ( 1999 [employer])   Q1: Little interest or pleasure in doing things 1 1 1 0 1 1 1   Q2: Feeling down, depressed or hopeless 1 1 1 0 0 0 0   PHQ-2 Score 2 2 2 0 1 1 1   Q1: Little interest or pleasure in doing things Several days Several days Several days Not at all Several days Several days Several days   Q2: Feeling down, depressed or hopeless Several days Several days Several days Not at all Not at all Not at all Not at all   PHQ-2 Score 2 2 2 0 1 1 1     PHQ9:       4/12/2023     4:37 PM 1/11/2024     4:02 PM   PHQ-9 SCORE   PHQ-9 Total Score MyChart 5 (Mild depression) 9 (Mild depression)   PHQ-9 Total Score 5 9     GAD2:       4/12/2023     4:53 PM 7/18/2023     8:25 AM 10/17/2023     9:53 AM 10/30/2023    12:07 PM 12/19/2023     9:58 AM   BISHOP-2   Feeling nervous, anxious, or on edge 1 0 1 1 1   Not being able to stop or control worrying 1 0 1 1 1   BISHOP-2 Total Score 2 0    0 2    2 2 2        ASSESSMENT: Current Emotional / Mental Status (status of significant symptoms):   Risk status (Self / Other harm or suicidal ideation)   Patient denies current fears or concerns for personal safety.   Patient denies current or recent suicidal ideation or behaviors.   Patient denies current or recent homicidal ideation or  behaviors.   Patient denies current or recent self injurious behavior or ideation.   Patient denies other safety concerns.   Patient reports there has been no change in risk factors since their last session.     Patient reports there has been no change in protective factors since their last session.     Recommended that patient call 911 or go to the local ED should there be a change in any of these risk factors.     Appearance:   Appropriate    Eye Contact:   Good    Psychomotor Behavior: Normal    Attitude:   Cooperative  Interested Friendly Pleasant   Orientation:   All   Speech    Rate / Production: Normal/ Responsive Talkative    Volume:  Normal    Mood:    Depressed  Normal   Affect:    Appropriate  frustrated    Thought Content:  Clear    Thought Form:  Coherent  Logical    Insight:    Fair  and Intellectual Insight     Medication Review:   No current psychiatric medications prescribed     Medication Compliance:   NA     Changes in Health Issues:   None reported     Chemical Use Review:   Substance Use: Chemical use reviewed, no active concerns identified      Tobacco Use: No change in amount of tobacco use since last session.  Provided encouragement to quit   Patient declined discussion at this time    Diagnosis:  1. Adjustment disorder with depressed mood        Collateral Reports Completed:   Not Applicable    PLAN: (Patient Tasks / Therapist Tasks / Other)  Try to engage in positive self-talk and self-compassion  Work on being honest with yourself and others  Follow-up on VON eval request  Make a list of current behaviors that don't align with your big goals and bring to session next time  Determine what you need to go to let go of your expectations    LADONNA Paez                                                         ______________________________________________________________________    Individual Treatment Plan    Patient's Name: Sofi Escobedo  YOB: 1995    Date of  Creation: 4/26/23  Date Treatment Plan Last Reviewed/Revised: 11/10/23    DSM5 Diagnoses: Adjustment Disorders  309.0 (F43.21) With depressed mood  Psychosocial / Contextual Factors: Pt recently moved to [place] from [place] and has been dealing with the culture shift. Pt has also been working on trying to get himself established here which has been challenging.  PROMIS (reviewed every 90 days):   PROMIS-10 Scores        7/18/2023     8:26 AM 10/17/2023     9:55 AM 10/30/2023    12:08 PM   PROMIS-10 Total Score w/o Sub Scores   PROMIS TOTAL - SUBSCORES 33    33 26    26 32       Referral / Collaboration:  Referral to another professional/service is not indicated at this time..    Anticipated number of session for this episode of care: 9-12 sessions  Anticipation frequency of session: Biweekly  Anticipated Duration of each session: 38-52 minutes  Treatment plan will be reviewed in 90 days or when goals have been changed.       MeasurableTreatment Goal(s) related to diagnosis / functional impairment(s)  Goal 1: Patient will better manage his anger/frustration, not getting as easily aggravated, and not letting comments get to him as easily.    I will know I've met my goal when I am able to not let others get to me so much.      Objective #A (Patient Action)    Patient will identify triggers, thoughts, and current stressors which contribute to feelings of anxiety  identify patterns of escalation  (i.e. tightness in chest, flushed face, increased heart rate, clenched hands, etc.)  identify at least 3 techniques for intervening on the escalation  use cognitive strategies identified in therapy to challenge anxious thoughts  Increase interest, engagement, and pleasure in doing things  Decrease frequency and intensity of feeling down, depressed, hopeless  Identify negative self-talk and behaviors: challenge core beliefs, myths, and actions  Improve concentration, focus, and mindfulness in daily activities   identify 3 coping  strategies for improving mood  learn at least 3 self-regulation strategies.  Status: Continued - Date(s): 11/10/23    Intervention(s)  Therapist will assign homework related to target objective with the intent to promote pt autonomy and better manage MH.  Facilitate increase in self-awareness  Provide psycho-education on emotion identification/regulation and coping skills  Teach/promote utilization of mindfulness skills and grounding    Goal 2: Patient will feel worthy and be able to find validation internally.     I will know I've met my goal when able to feel more complete or worthy without needing it externally.      Objective #A (Patient Action)    Status: Continued - Date(s): 11/10/23    Patient will identify and compliment positives at least 3 times daily to support positive self-image  Decrease frequency and intensity of feeling down, depressed, hopeless  Identify negative self-talk and behaviors: challenge core beliefs, myths, and actions  identify 5 traits or charcteristics you like about yourself  identify at least 3 self-care activities.    Intervention(s)  Therapist will assign homework related to target objective with the intent to promote pt autonomy and better manage MH.  Facilitate increase in self-awareness  Provide psycho-education on self-care/compassion and narrative therapy interventions  Teach/promote utilization of reframing and boundary setting    Objective #B Being more authentic with myself and others through improving my self-esteem.   Patient will participate in social activities to improve mood  learn & utilize at least 3 assertive communication skills weekly  identify 5 traits or charcteristics you like about yourself  identify at least 3 adaptive coping statements to counteract negative thoughts.    Status: Continued - Date(s): 11/10/23    Intervention(s)  Therapist will assign homework related to target objective with the intent to promote pt autonomy and better manage MH.  Facilitate  increase in self-awareness  Provide psycho-education on self-esteem building and self-exploration  Teach/promote utilization of positive self-affirmations and critical observation skills    Patient has reviewed and agreed to the above plan.      Judd Clemons, LPCC  April 26, 2023

## 2024-01-12 ASSESSMENT — PATIENT HEALTH QUESTIONNAIRE - PHQ9: SUM OF ALL RESPONSES TO PHQ QUESTIONS 1-9: 9

## 2024-01-12 ASSESSMENT — ANXIETY QUESTIONNAIRES: GAD7 TOTAL SCORE: 7

## 2024-01-18 ENCOUNTER — VIRTUAL VISIT (OUTPATIENT)
Dept: PSYCHOLOGY | Facility: CLINIC | Age: 29
End: 2024-01-18
Payer: COMMERCIAL

## 2024-01-18 DIAGNOSIS — F43.21 ADJUSTMENT DISORDER WITH DEPRESSED MOOD: Primary | ICD-10-CM

## 2024-01-18 PROCEDURE — 90837 PSYTX W PT 60 MINUTES: CPT | Mod: 95 | Performed by: COUNSELOR
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
## 2024-01-26 ENCOUNTER — VIRTUAL VISIT (OUTPATIENT)
Dept: PSYCHOLOGY | Facility: CLINIC | Age: 29
End: 2024-01-26
Payer: COMMERCIAL

## 2024-01-26 DIAGNOSIS — F43.21 ADJUSTMENT DISORDER WITH DEPRESSED MOOD: Primary | ICD-10-CM

## 2024-01-26 PROCEDURE — 90837 PSYTX W PT 60 MINUTES: CPT | Mod: 95 | Performed by: COUNSELOR

## 2024-01-26 NOTE — PROGRESS NOTES
[facility]                                     Progress Note    Patient Name: Sofi Escobedo  Date: 1/26/24         Service Type: Individual      Session Start Time: 1:01 pm  Session End Time: 1:58 pm     Session Length: 57 minutes    Session #: 24    Attendees: Client attended alone    Service Modality:  Video Visit:      Provider verified identity through the following two step process.  Patient provided:  Patient is known previously to provider    Telemedicine Visit: The patient's condition can be safely assessed and treated via synchronous audio and visual telemedicine encounter.      Reason for Telemedicine Visit: Services only offered telehealth    Originating Site (Patient Location): Patient's home    Distant Site (Provider Location): Provider Remote Setting- Home Office    Consent:  The patient/guardian has verbally consented to: the potential risks and benefits of telemedicine (video visit) versus in person care; bill my insurance or make self-payment for services provided; and responsibility for payment of non-covered services.     Patient would like the video invitation sent by:  My Chart    Mode of Communication:  Video Conference via [employer]well    Distant Location (Provider):  Off-site    As the provider I attest to compliance with applicable laws and regulations related to telemedicine.    DATA  Interactive Complexity: No  Crisis: No  Extended Session (53+ minutes): PROLONGED SERVICE IN THE OUTPATIENT SETTING REQUIRING DIRECT (FACE-TO-FACE) PATIENT CONTACT BEYOND THE USUAL SERVICE:    - Patient's presenting concerns require more intensive intervention than could be completed within the usual service     Progress Since Last Session (Related to Symptoms / Goals / Homework):   Symptoms: Worsening pt has felt more depressed and frustrated/stressed recently    Homework: Partially completed      Episode of Care Goals: Satisfactory progress - PREPARATION (Decided to change -  considering how); Intervened by negotiating a change plan and determining options / strategies for behavior change, identifying triggers, exploring social supports, and working towards setting a date to begin behavior change     Current / Ongoing Stressors and Concerns:   Pt is currently seeking therapy due to ongoing anxiety/depression and to better manage his temper. Since last session, pt reported that his interview for a new position at work in another department, to help reduce stress, went well and he is feeling confident about it. 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 Pt processed through frustation he had at work recently. Pt reflected that he is following policy and others aren't which puts him in situations that don't feel safe. Pt has been trying to self-advocate for work around this and even checked in with the  to see if he was mistake about it, which he was told he wasn't. Given his recent experiences, pt feels unsupported at work by his immediate leadership and that he can't work safely due to them not seemingly understanding or wanting to follow policy. Pt also has been dealing with additional life stressors which has been making this more challenging to cope with as well outside of work. Pt discussed how he recently learned that a good friend, who lives in another state, past passed away recently, which bring up more feelings of grief/guilt he has been dealing with for the last couple months. Pt also has been feeling sick which has sucked a lot of his energy out as well. Pt processed through how he recently asked for help from his girlfriend and it was a frustrating experience. Pt reflected that he has trouble asking for help generally and when it doesn't feel like he is getting the help he requested it reinforces the idea that he shouldn't do it in the first place. After discussing it pt was able  to recognize that a lot of these feelings are rooted in past experiences and that this most recent one may be more related to other issues he's identified in his relationship (ie a mismatch in priorities/values/maturity) then being a reflection of what he has been through before. Pt's also expressed that his feelings of frustration were also likely elevated due to feeling sick and tired at the time, which just compounds everything. On a positive note pt has been dong better with reducing his alcohol intake. Session took longer then anticipated due to presenting concerns.     Personal Goals:  Good Credit   license  House  Be with a partner that is invested in pt and is financially independent     Treatment Objective(s) Addressed in This Session:   identify and compliment positives at least 3 times daily to support positive self-image  identify triggers, thoughts, and current stressors which contribute to feelings of anxiety  identify patterns of escalation  (i.e. tightness in chest, flushed face, increased heart rate, clenched hands, etc.)  identify at least 3 techniques for intervening on the escalation  use cognitive strategies identified in therapy to challenge anxious thoughts  Decrease frequency and intensity of feeling down, depressed, hopeless  Identify negative self-talk and behaviors: challenge core beliefs, myths, and actions  identify two areas of life that you would like to have improved functioning  identify at least 3 self-care activities     Intervention:   CBT: Reviewed recently behavior patterns and reinforcing cycle  Motivational Interviewing    MI Intervention: Expressed Empathy/Understanding, Supported Autonomy, Collaboration, Evocation, Open-ended questions, Reflections: simple and complex, Change talk (evoked), and Reframe     Change Talk Expressed by the Patient: Desire to change Ability to change Reasons to change Need to change Committment to change Activation Taking  steps    Provider Response to Change Talk: E - Evoked more info from patient about behavior change, A - Affirmed patient's thoughts, decisions, or attempts at behavior change, R - Reflected patient's change talk, and S - Summarized patient's change talk statements    Psychodynamic: Processed through internal-experiences related to anger and frustration management  Solution Focused: Identified immediate areas of concern and potential barriers to success in relationships    Assessments completed prior to visit:  PHQ2:       12/19/2023     9:58 AM 9/26/2023     9:48 AM 9/19/2023     9:30 AM 6/19/2023     4:01 PM 6/13/2023     8:13 AM 3/7/2023    11:25 AM 3/7/2023    11:24 AM   PHQ-2 ( 1999 [employer])   Q1: Little interest or pleasure in doing things 1 1 1 0 1 1 1   Q2: Feeling down, depressed or hopeless 1 1 1 0 0 0 0   PHQ-2 Score 2 2 2 0 1 1 1   Q1: Little interest or pleasure in doing things Several days Several days Several days Not at all Several days Several days Several days   Q2: Feeling down, depressed or hopeless Several days Several days Several days Not at all Not at all Not at all Not at all   PHQ-2 Score 2 2 2 0 1 1 1     PHQ9:       4/12/2023     4:37 PM 1/11/2024     4:02 PM   PHQ-9 SCORE   PHQ-9 Total Score MyChart 5 (Mild depression) 9 (Mild depression)   PHQ-9 Total Score 5 9     GAD2:       4/12/2023     4:53 PM 7/18/2023     8:25 AM 10/17/2023     9:53 AM 10/30/2023    12:07 PM 12/19/2023     9:58 AM   BISHOP-2   Feeling nervous, anxious, or on edge 1 0 1 1 1   Not being able to stop or control worrying 1 0 1 1 1   BISHOP-2 Total Score 2 0    0 2    2 2 2        ASSESSMENT: Current Emotional / Mental Status (status of significant symptoms):   Risk status (Self / Other harm or suicidal ideation)   Patient denies current fears or concerns for personal safety.   Patient denies current or recent suicidal ideation or behaviors.   Patient denies current or recent homicidal ideation or behaviors.   Patient denies  current or recent self injurious behavior or ideation.   Patient denies other safety concerns.   Patient reports there has been no change in risk factors since their last session.     Patient reports there has been no change in protective factors since their last session.     Recommended that patient call 911 or go to the local ED should there be a change in any of these risk factors.     Appearance:   Appropriate    Eye Contact:   Good    Psychomotor Behavior: Normal    Attitude:   Cooperative  Interested Friendly Pleasant   Orientation:   All   Speech    Rate / Production: Normal/ Responsive Talkative    Volume:  Normal    Mood:    Depressed  Normal   Affect:    Appropriate  Lethargic  Worrisome  frustrated    Thought Content:  Clear    Thought Form:  Coherent  Logical    Insight:    Good  and Intellectual Insight     Medication Review:   No current psychiatric medications prescribed     Medication Compliance:   NA     Changes in Health Issues:   None reported     Chemical Use Review:   Substance Use: Chemical use reviewed, no active concerns identified      Tobacco Use: No change in amount of tobacco use since last session.  Provided encouragement to quit   Patient declined discussion at this time    Diagnosis:  1. Adjustment disorder with depressed mood          Collateral Reports Completed:   Not Applicable    PLAN: (Patient Tasks / Therapist Tasks / Other)  Focus on self-care and rest if feeling sick   Work on being honest with yourself and others  Follow-up on VON eval request  Continue to determine what you need to go to let go of your expectations      LADONNA Paez                                                         ______________________________________________________________________    Individual Treatment Plan    Patient's Name: Sofi Escobedo  YOB: 1995    Date of Creation: 4/26/23  Date Treatment Plan Last Reviewed/Revised: 11/10/23    DSM5 Diagnoses: Adjustment  Disorders  309.0 (F43.21) With depressed mood  Psychosocial / Contextual Factors: Pt recently moved to [place] from [place] and has been dealing with the culture shift. Pt has also been working on trying to get himself established here which has been challenging.  PROMIS (reviewed every 90 days):   PROMIS-10 Scores        7/18/2023     8:26 AM 10/17/2023     9:55 AM 10/30/2023    12:08 PM   PROMIS-10 Total Score w/o Sub Scores   PROMIS TOTAL - SUBSCORES 33    33 26    26 32       Referral / Collaboration:  Referral to another professional/service is not indicated at this time..    Anticipated number of session for this episode of care: 9-12 sessions  Anticipation frequency of session: Biweekly  Anticipated Duration of each session: 38-52 minutes  Treatment plan will be reviewed in 90 days or when goals have been changed.       MeasurableTreatment Goal(s) related to diagnosis / functional impairment(s)  Goal 1: Patient will better manage his anger/frustration, not getting as easily aggravated, and not letting comments get to him as easily.    I will know I've met my goal when I am able to not let others get to me so much.      Objective #A (Patient Action)    Patient will identify triggers, thoughts, and current stressors which contribute to feelings of anxiety  identify patterns of escalation  (i.e. tightness in chest, flushed face, increased heart rate, clenched hands, etc.)  identify at least 3 techniques for intervening on the escalation  use cognitive strategies identified in therapy to challenge anxious thoughts  Increase interest, engagement, and pleasure in doing things  Decrease frequency and intensity of feeling down, depressed, hopeless  Identify negative self-talk and behaviors: challenge core beliefs, myths, and actions  Improve concentration, focus, and mindfulness in daily activities   identify 3 coping strategies for improving mood  learn at least 3 self-regulation strategies.  Status: Continued - Date(s):  11/10/23    Intervention(s)  Therapist will assign homework related to target objective with the intent to promote pt autonomy and better manage MH.  Facilitate increase in self-awareness  Provide psycho-education on emotion identification/regulation and coping skills  Teach/promote utilization of mindfulness skills and grounding    Goal 2: Patient will feel worthy and be able to find validation internally.     I will know I've met my goal when able to feel more complete or worthy without needing it externally.      Objective #A (Patient Action)    Status: Continued - Date(s): 11/10/23    Patient will identify and compliment positives at least 3 times daily to support positive self-image  Decrease frequency and intensity of feeling down, depressed, hopeless  Identify negative self-talk and behaviors: challenge core beliefs, myths, and actions  identify 5 traits or charcteristics you like about yourself  identify at least 3 self-care activities.    Intervention(s)  Therapist will assign homework related to target objective with the intent to promote pt autonomy and better manage MH.  Facilitate increase in self-awareness  Provide psycho-education on self-care/compassion and narrative therapy interventions  Teach/promote utilization of reframing and boundary setting    Objective #B Being more authentic with myself and others through improving my self-esteem.   Patient will participate in social activities to improve mood  learn & utilize at least 3 assertive communication skills weekly  identify 5 traits or charcteristics you like about yourself  identify at least 3 adaptive coping statements to counteract negative thoughts.    Status: Continued - Date(s): 11/10/23    Intervention(s)  Therapist will assign homework related to target objective with the intent to promote pt autonomy and better manage MH.  Facilitate increase in self-awareness  Provide psycho-education on self-esteem building and  self-exploration  Teach/promote utilization of positive self-affirmations and critical observation skills    Patient has reviewed and agreed to the above plan.      Judd Clemons, LPCC  April 26, 2023

## 2024-02-01 ENCOUNTER — LAB (OUTPATIENT)
Dept: LAB | Facility: CLINIC | Age: 29
End: 2024-02-01
Payer: COMMERCIAL

## 2024-02-01 DIAGNOSIS — Z11.3 ROUTINE SCREENING FOR STI (SEXUALLY TRANSMITTED INFECTION): ICD-10-CM

## 2024-02-01 LAB
C TRACH DNA SPEC QL PROBE+SIG AMP: NEGATIVE
HIV 1+2 AB+HIV1 P24 AG SERPL QL IA: NONREACTIVE
N GONORRHOEA DNA SPEC QL NAA+PROBE: NEGATIVE
T PALLIDUM AB SER QL: NONREACTIVE

## 2024-02-01 PROCEDURE — 86780 TREPONEMA PALLIDUM: CPT

## 2024-02-01 PROCEDURE — 87591 N.GONORRHOEAE DNA AMP PROB: CPT

## 2024-02-01 PROCEDURE — 87389 HIV-1 AG W/HIV-1&-2 AB AG IA: CPT

## 2024-02-01 PROCEDURE — 36415 COLL VENOUS BLD VENIPUNCTURE: CPT

## 2024-02-01 PROCEDURE — 87491 CHLMYD TRACH DNA AMP PROBE: CPT

## 2024-02-02 ENCOUNTER — VIRTUAL VISIT (OUTPATIENT)
Dept: PSYCHOLOGY | Facility: CLINIC | Age: 29
End: 2024-02-02
Payer: COMMERCIAL

## 2024-02-02 DIAGNOSIS — F43.21 ADJUSTMENT DISORDER WITH DEPRESSED MOOD: Primary | ICD-10-CM

## 2024-02-02 PROCEDURE — 90837 PSYTX W PT 60 MINUTES: CPT | Mod: 95 | Performed by: COUNSELOR

## 2024-02-02 NOTE — PROGRESS NOTES
[facility]                                     Progress Note    Patient Name: Sofi Escobedo  Date: 2/02/24         Service Type: Individual      Session Start Time: 1:04 pm  Session End Time: 2:00 pm     Session Length: 56 minutes    Session #: 25    Attendees: Client attended alone    Service Modality:  Video Visit:      Provider verified identity through the following two step process.  Patient provided:  Patient is known previously to provider    Telemedicine Visit: The patient's condition can be safely assessed and treated via synchronous audio and visual telemedicine encounter.      Reason for Telemedicine Visit: Services only offered telehealth    Originating Site (Patient Location): Patient's home    Distant Site (Provider Location): Provider Remote Setting- Home Office    Consent:  The patient/guardian has verbally consented to: the potential risks and benefits of telemedicine (video visit) versus in person care; bill my insurance or make self-payment for services provided; and responsibility for payment of non-covered services.     Patient would like the video invitation sent by:  My Chart    Mode of Communication:  Video Conference via [employer]well    Distant Location (Provider):  Off-site    As the provider I attest to compliance with applicable laws and regulations related to telemedicine.    DATA  Interactive Complexity: No  Crisis: No  Extended Session (53+ minutes): PROLONGED SERVICE IN THE OUTPATIENT SETTING REQUIRING DIRECT (FACE-TO-FACE) PATIENT CONTACT BEYOND THE USUAL SERVICE:    - Patient's presenting concerns require more intensive intervention than could be completed within the usual service     Progress Since Last Session (Related to Symptoms / Goals / Homework):   Symptoms: Worsening pt has felt more depressed and frustrated/stressed recently    Homework: Partially completed      Episode of Care Goals: Satisfactory progress - PREPARATION (Decided to change -  considering how); Intervened by negotiating a change plan and determining options / strategies for behavior change, identifying triggers, exploring social supports, and working towards setting a date to begin behavior change     Current / Ongoing Stressors and Concerns:   Pt is currently seeking therapy due to ongoing anxiety/depression and to better manage his temper. Since last session, pt reported that he is feeling better since being sick and while sick he found his girlfriend trying to pick arguments with him. Pt noted that this experience reinforced to him that his needs aren't being met in this relationship, including it being a lot of stress, and recognized that he needed to be honest about where he is at in the relationship. Pt concluded that he may still have some feelings for her but he isn't invested in their relationship, and that their interactions have become less constructive. His girlfriend has moved out and he thinks he doesn't want to even be friends after this, as he doesn't want to get sucked back into it. Pt processed through recent work stressors and how that has been mentally taxing him. Pt reflected that he feels that the supervisors are not setting him up for success, and his managers are being undermined by people not following through on their own policies. Pt verbalized that much of the time he doesn't feel safe, especially since the [facility] itself stepped in to raise concerns. Pt ended up having to take a partial day off because of this, and explained to his manager what happened, who agreed with pt. Pt discussed that he is now trying to navigate next steps in his work journey. 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 Pt is uncertain what he wants to do and processed through the pro's and con's of each. Pt reflected that he is willing to apply for this supervisor  role in order to model the change he wants to see, and is going to continue pursuing the other interview as he doesn't think that staying in his exact same role is a good choice right now. Pt does think that either role would help alleviate some of the mental health strain he has been feeling recently. Pt discussed how he is trying to engage in more self-care and agreed to abstain from engaging in a rebound relationship as he agrees that it may be part of an unhelpful pattern for him after he ends a relationship. Pt is going to take himself out and try to find other options to help him feel emotionally fulfilled outside of physical intimacy with others and being in a relationship whenever he feels lonely. Session took longer then anticipated due to presenting concerns.     Personal Goals:  Good Credit   license  House  Be with a partner that is invested in pt and is financially independent     Treatment Objective(s) Addressed in This Session:   identify and compliment positives at least 3 times daily to support positive self-image  identify triggers, thoughts, and current stressors which contribute to feelings of anxiety  identify patterns of escalation  (i.e. tightness in chest, flushed face, increased heart rate, clenched hands, etc.)  identify at least 3 techniques for intervening on the escalation  use cognitive strategies identified in therapy to challenge anxious thoughts  Decrease frequency and intensity of feeling down, depressed, hopeless  Identify negative self-talk and behaviors: challenge core beliefs, myths, and actions  identify two areas of life that you would like to have improved functioning  identify at least 3 self-care activities     Intervention:   CBT: Reviewed recently behavior patterns and reinforcing cycle  Motivational Interviewing    MI Intervention: Expressed Empathy/Understanding, Supported Autonomy, Collaboration, Evocation, Open-ended questions, Reflections: simple and  complex, Change talk (evoked), and Reframe     Change Talk Expressed by the Patient: Desire to change Ability to change Reasons to change Need to change Committment to change Activation Taking steps    Provider Response to Change Talk: E - Evoked more info from patient about behavior change, A - Affirmed patient's thoughts, decisions, or attempts at behavior change, R - Reflected patient's change talk, and S - Summarized patient's change talk statements    Psychodynamic: Processed through internal-experiences related to anger and frustration management  Solution Focused: Identified immediate areas of concern and potential barriers to success in relationships    Assessments completed prior to visit:  PHQ2:       12/19/2023     9:58 AM 9/26/2023     9:48 AM 9/19/2023     9:30 AM 6/19/2023     4:01 PM 6/13/2023     8:13 AM 3/7/2023    11:25 AM 3/7/2023    11:24 AM   PHQ-2 ( 1999 [employer])   Q1: Little interest or pleasure in doing things 1 1 1 0 1 1 1   Q2: Feeling down, depressed or hopeless 1 1 1 0 0 0 0   PHQ-2 Score 2 2 2 0 1 1 1   Q1: Little interest or pleasure in doing things Several days Several days Several days Not at all Several days Several days Several days   Q2: Feeling down, depressed or hopeless Several days Several days Several days Not at all Not at all Not at all Not at all   PHQ-2 Score 2 2 2 0 1 1 1     PHQ9:       4/12/2023     4:37 PM 1/11/2024     4:02 PM   PHQ-9 SCORE   PHQ-9 Total Score MyChart 5 (Mild depression) 9 (Mild depression)   PHQ-9 Total Score 5 9     GAD2:       4/12/2023     4:53 PM 7/18/2023     8:25 AM 10/17/2023     9:53 AM 10/30/2023    12:07 PM 12/19/2023     9:58 AM   BISHOP-2   Feeling nervous, anxious, or on edge 1 0 1 1 1   Not being able to stop or control worrying 1 0 1 1 1   BISHOP-2 Total Score 2 0    0 2    2 2 2        ASSESSMENT: Current Emotional / Mental Status (status of significant symptoms):   Risk status (Self / Other harm or suicidal ideation)   Patient denies  current fears or concerns for personal safety.   Patient denies current or recent suicidal ideation or behaviors.   Patient denies current or recent homicidal ideation or behaviors.   Patient denies current or recent self injurious behavior or ideation.   Patient denies other safety concerns.   Patient reports there has been no change in risk factors since their last session.     Patient reports there has been no change in protective factors since their last session.     Recommended that patient call 911 or go to the local ED should there be a change in any of these risk factors.     Appearance:   Appropriate    Eye Contact:   Good    Psychomotor Behavior: Normal    Attitude:   Cooperative  Interested Friendly Pleasant   Orientation:   All   Speech    Rate / Production: Normal/ Responsive Talkative    Volume:  Normal    Mood:    Depressed  Normal   Affect:    Appropriate  Lethargic  Worrisome  frustrated    Thought Content:  Clear    Thought Form:  Coherent  Logical    Insight:    Good  and Intellectual Insight     Medication Review:   No current psychiatric medications prescribed     Medication Compliance:   NA     Changes in Health Issues:   None reported     Chemical Use Review:   Substance Use: Chemical use reviewed, no active concerns identified      Tobacco Use: No change in amount of tobacco use since last session.  Provided encouragement to quit   Patient declined discussion at this time    Diagnosis:  1. Adjustment disorder with depressed mood          Collateral Reports Completed:   Not Applicable    PLAN: (Patient Tasks / Therapist Tasks / Other)  Engage in self-compassion and take self out  Follow-up on work related tasks discussed in session  Abstain from seeking out women when feeling lonely  Try to focus on doing physical activity or other distraction that support self of well being  Follow-up on VON eval request    Judd Clemons [facility]                                                          ______________________________________________________________________    Individual Treatment Plan    Patient's Name: Sofi Escobedo  YOB: 1995    Date of Creation: 4/26/23  Date Treatment Plan Last Reviewed/Revised: 11/10/23    DSM5 Diagnoses: Adjustment Disorders  309.0 (F43.21) With depressed mood  Psychosocial / Contextual Factors: Pt recently moved to [place] from [place] and has been dealing with the culture shift. Pt has also been working on trying to get himself established here which has been challenging.  PROMIS (reviewed every 90 days):   PROMIS-10 Scores        7/18/2023     8:26 AM 10/17/2023     9:55 AM 10/30/2023    12:08 PM   PROMIS-10 Total Score w/o Sub Scores   PROMIS TOTAL - SUBSCORES 33    33 26    26 32       Referral / Collaboration:  Referral to another professional/service is not indicated at this time..    Anticipated number of session for this episode of care: 9-12 sessions  Anticipation frequency of session: Biweekly  Anticipated Duration of each session: 38-52 minutes  Treatment plan will be reviewed in 90 days or when goals have been changed.       MeasurableTreatment Goal(s) related to diagnosis / functional impairment(s)  Goal 1: Patient will better manage his anger/frustration, not getting as easily aggravated, and not letting comments get to him as easily.    I will know I've met my goal when I am able to not let others get to me so much.      Objective #A (Patient Action)    Patient will identify triggers, thoughts, and current stressors which contribute to feelings of anxiety  identify patterns of escalation  (i.e. tightness in chest, flushed face, increased heart rate, clenched hands, etc.)  identify at least 3 techniques for intervening on the escalation  use cognitive strategies identified in therapy to challenge anxious thoughts  Increase interest, engagement, and pleasure in doing things  Decrease frequency and intensity of feeling down, depressed,  hopeless  Identify negative self-talk and behaviors: challenge core beliefs, myths, and actions  Improve concentration, focus, and mindfulness in daily activities   identify 3 coping strategies for improving mood  learn at least 3 self-regulation strategies.  Status: Continued - Date(s): 11/10/23    Intervention(s)  Therapist will assign homework related to target objective with the intent to promote pt autonomy and better manage MH.  Facilitate increase in self-awareness  Provide psycho-education on emotion identification/regulation and coping skills  Teach/promote utilization of mindfulness skills and grounding    Goal 2: Patient will feel worthy and be able to find validation internally.     I will know I've met my goal when able to feel more complete or worthy without needing it externally.      Objective #A (Patient Action)    Status: Continued - Date(s): 11/10/23    Patient will identify and compliment positives at least 3 times daily to support positive self-image  Decrease frequency and intensity of feeling down, depressed, hopeless  Identify negative self-talk and behaviors: challenge core beliefs, myths, and actions  identify 5 traits or charcteristics you like about yourself  identify at least 3 self-care activities.    Intervention(s)  Therapist will assign homework related to target objective with the intent to promote pt autonomy and better manage MH.  Facilitate increase in self-awareness  Provide psycho-education on self-care/compassion and narrative therapy interventions  Teach/promote utilization of reframing and boundary setting    Objective #B Being more authentic with myself and others through improving my self-esteem.   Patient will participate in social activities to improve mood  learn & utilize at least 3 assertive communication skills weekly  identify 5 traits or charcteristics you like about yourself  identify at least 3 adaptive coping statements to counteract negative  thoughts.    Status: Continued - Date(s): 11/10/23    Intervention(s)  Therapist will assign homework related to target objective with the intent to promote pt autonomy and better manage MH.  Facilitate increase in self-awareness  Provide psycho-education on self-esteem building and self-exploration  Teach/promote utilization of positive self-affirmations and critical observation skills    Patient has reviewed and agreed to the above plan.      Judd Clemons, [facility]  April 26, 2023

## 2024-02-09 ENCOUNTER — VIRTUAL VISIT (OUTPATIENT)
Dept: PSYCHOLOGY | Facility: CLINIC | Age: 29
End: 2024-02-09
Payer: COMMERCIAL

## 2024-02-09 DIAGNOSIS — F43.21 ADJUSTMENT DISORDER WITH DEPRESSED MOOD: Primary | ICD-10-CM

## 2024-02-09 PROCEDURE — 90834 PSYTX W PT 45 MINUTES: CPT | Mod: 95 | Performed by: COUNSELOR

## 2024-02-09 NOTE — PROGRESS NOTES
[facility]                                     Progress Note    Patient Name: Sofi Escobedo  Date: 2/09/24         Service Type: Individual      Session Start Time: 11:00 am  Session End Time: 11:45 am     Session Length: 45 minutes    Session #: 26    Attendees: Client attended alone    Service Modality:  Video Visit:      Provider verified identity through the following two step process.  Patient provided:  Patient is known previously to provider    Telemedicine Visit: The patient's condition can be safely assessed and treated via synchronous audio and visual telemedicine encounter.      Reason for Telemedicine Visit: Services only offered telehealth    Originating Site (Patient Location): Patient's home    Distant Site (Provider Location): Provider Remote Setting- Home Office    Consent:  The patient/guardian has verbally consented to: the potential risks and benefits of telemedicine (video visit) versus in person care; bill my insurance or make self-payment for services provided; and responsibility for payment of non-covered services.     Patient would like the video invitation sent by:  My Chart    Mode of Communication:  Video Conference via [facility]    Distant Location (Provider):  Off-site    As the provider I attest to compliance with applicable laws and regulations related to telemedicine.    DATA  Interactive Complexity: No  Crisis: No  Extended Session (53+ minutes): No     Progress Since Last Session (Related to Symptoms / Goals / Homework):   Symptoms: No change pt continues to feel much the same    Homework: Partially completed      Episode of Care Goals: Satisfactory progress - PREPARATION (Decided to change - considering how); Intervened by negotiating a change plan and determining options / strategies for behavior change, identifying triggers, exploring social supports, and working towards setting a date to begin behavior change     Current / Ongoing Stressors and  Concerns:   Pt is currently seeking therapy due to ongoing anxiety/depression and to better manage his temper. 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 Pt still has plans to apply the for the job that his current managers have encouraged him to apply for still, as he wants to know all his options. Pt felt that until he got this news he has been feeling a bit down and thinks he is still getting over a cold. Pt has been reflecting on how he has been feeling overwhelmed for a while when it comes to his finances and being able to make bigger decisions. Pt discussed strategies to help his break things down and identify his first steps. Pt verbalized that he needs to see a  and discuss how to make plan to address the financial stressors as they are this biggest ones in his life right now. Pt reflected that he spends a lot of money in ways he recognizes in retrospect as frivolous and thinks he does so to reaffirm feelings that he is successful by not having to worry about money. Unfortunately pt confirmed that this does not provide that kind of affirmation when he does it and in fact is counterproductive in reaching his actual goals. When asked then why he keeps doing it, knowing that this behavior pattern doesn't fulfill it purpose, he wasn't able to give an answer. Pt processed through his relationship and recent break up. Pt reflected that he needs to focus more on himself and not engage in self-blame or internalizing when it come to his recent breakup. Pt has been successful in not engaging in unhelpful intimacy and currently thinks that is helping him to accomplish this. Pt endorsesd that sleep hasn't been great either which may be contributing to how he has felt recently.     Personal Goals:  Good Credit   license  House  Be with a partner that is invested in pt and is  financially independent     Treatment Objective(s) Addressed in This Session:   identify and compliment positives at least 3 times daily to support positive self-image  identify triggers, thoughts, and current stressors which contribute to feelings of anxiety  identify patterns of escalation  (i.e. tightness in chest, flushed face, increased heart rate, clenched hands, etc.)  identify at least 3 techniques for intervening on the escalation  use cognitive strategies identified in therapy to challenge anxious thoughts  Decrease frequency and intensity of feeling down, depressed, hopeless  Identify negative self-talk and behaviors: challenge core beliefs, myths, and actions  identify two areas of life that you would like to have improved functioning  identify at least 3 self-care activities     Intervention:   CBT: Reviewed recently behavior patterns and reinforcing cycle  Motivational Interviewing    MI Intervention: Expressed Empathy/Understanding, Supported Autonomy, Collaboration, Evocation, Permission to raise concern or advise, Open-ended questions, Reflections: simple and complex, Change talk (evoked), and Reframe     Change Talk Expressed by the Patient: Desire to change Ability to change Reasons to change Committment to change    Provider Response to Change Talk: E - Evoked more info from patient about behavior change, A - Affirmed patient's thoughts, decisions, or attempts at behavior change, R - Reflected patient's change talk, and S - Summarized patient's change talk statements    Psychodynamic: Processed through internal-experiences related to anger and frustration management  Solution Focused: Identified immediate areas of concern and potential barriers to success in relationships    Assessments completed prior to visit:  PHQ2:       12/19/2023     9:58 AM 9/26/2023     9:48 AM 9/19/2023     9:30 AM 6/19/2023     4:01 PM 6/13/2023     8:13 AM 3/7/2023    11:25 AM 3/7/2023    11:24 AM   PHQ-2 ( 1999  [employer])   Q1: Little interest or pleasure in doing things 1 1 1 0 1 1 1   Q2: Feeling down, depressed or hopeless 1 1 1 0 0 0 0   PHQ-2 Score 2 2 2 0 1 1 1   Q1: Little interest or pleasure in doing things Several days Several days Several days Not at all Several days Several days Several days   Q2: Feeling down, depressed or hopeless Several days Several days Several days Not at all Not at all Not at all Not at all   PHQ-2 Score 2 2 2 0 1 1 1     PHQ9:       4/12/2023     4:37 PM 1/11/2024     4:02 PM   PHQ-9 SCORE   PHQ-9 Total Score MyChart 5 (Mild depression) 9 (Mild depression)   PHQ-9 Total Score 5 9     GAD2:       4/12/2023     4:53 PM 7/18/2023     8:25 AM 10/17/2023     9:53 AM 10/30/2023    12:07 PM 12/19/2023     9:58 AM   BISHOP-2   Feeling nervous, anxious, or on edge 1 0 1 1 1   Not being able to stop or control worrying 1 0 1 1 1   BISHOP-2 Total Score 2 0    0 2    2 2 2        ASSESSMENT: Current Emotional / Mental Status (status of significant symptoms):   Risk status (Self / Other harm or suicidal ideation)   Patient denies current fears or concerns for personal safety.   Patient denies current or recent suicidal ideation or behaviors.   Patient denies current or recent homicidal ideation or behaviors.   Patient denies current or recent self injurious behavior or ideation.   Patient denies other safety concerns.   Patient reports there has been no change in risk factors since their last session.     Patient reports there has been no change in protective factors since their last session.     Recommended that patient call 911 or go to the local ED should there be a change in any of these risk factors.     Appearance:   Appropriate    Eye Contact:   Good    Psychomotor Behavior: Normal    Attitude:   Cooperative  Interested Friendly Pleasant   Orientation:   All   Speech    Rate / Production: Normal/ Responsive Talkative    Volume:  Normal    Mood:    Depressed  Normal   Affect:    Appropriate  Lethargic     Thought Content:  Clear    Thought Form:  Coherent  Logical    Insight:    Good  and Intellectual Insight     Medication Review:   No current psychiatric medications prescribed     Medication Compliance:   NA     Changes in Health Issues:   None reported     Chemical Use Review:   Substance Use: Chemical use reviewed, no active concerns identified      Tobacco Use: No change in amount of tobacco use since last session.  Provided encouragement to quit   Patient declined discussion at this time    Diagnosis:  1. 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  Continue to abstain from seeking out women when feeling lonely  Get ex-girlfriend's stuff out of your apartment  Make Energizing/Empowering/feel-good Play-list of music  Follow-up on VON eval request (extra credit)    Judd Clemons [facility]                                                         ______________________________________________________________________    Individual Treatment Plan    Patient's Name: Sofi Escobedo  YOB: 1995    Date of Creation: 4/26/23  Date Treatment Plan Last Reviewed/Revised: 11/10/23    DSM5 Diagnoses: Adjustment Disorders  309.0 (F43.21) With depressed mood  Psychosocial / Contextual Factors: Pt recently moved to [place] from [place] and has been dealing with the culture shift. Pt has also been working on trying to get himself established here which has been challenging.  PROMIS (reviewed every 90 days):   PROMIS-10 Scores        10/17/2023     9:55 AM 10/30/2023    12:08 PM 2/2/2024     1:03 PM   PROMIS-10 Total Score w/o Sub Scores   PROMIS TOTAL - SUBSCORES 26    26 32 25    25       Referral / Collaboration:  Referral to another professional/service is not indicated at this time..    Anticipated number of session for this episode  of care: 9-12 sessions  Anticipation frequency of session: Biweekly  Anticipated Duration of each session: 38-52 minutes  Treatment plan will be reviewed in 90 days or when goals have been changed.       MeasurableTreatment Goal(s) related to diagnosis / functional impairment(s)  Goal 1: Patient will better manage his anger/frustration, not getting as easily aggravated, and not letting comments get to him as easily.    I will know I've met my goal when I am able to not let others get to me so much.      Objective #A (Patient Action)    Patient will identify triggers, thoughts, and current stressors which contribute to feelings of anxiety  identify patterns of escalation  (i.e. tightness in chest, flushed face, increased heart rate, clenched hands, etc.)  identify at least 3 techniques for intervening on the escalation  use cognitive strategies identified in therapy to challenge anxious thoughts  Increase interest, engagement, and pleasure in doing things  Decrease frequency and intensity of feeling down, depressed, hopeless  Identify negative self-talk and behaviors: challenge core beliefs, myths, and actions  Improve concentration, focus, and mindfulness in daily activities   identify 3 coping strategies for improving mood  learn at least 3 self-regulation strategies.  Status: Continued - Date(s): 11/10/23    Intervention(s)  Therapist will assign homework related to target objective with the intent to promote pt autonomy and better manage MH.  Facilitate increase in self-awareness  Provide psycho-education on emotion identification/regulation and coping skills  Teach/promote utilization of mindfulness skills and grounding    Goal 2: Patient will feel worthy and be able to find validation internally.     I will know I've met my goal when able to feel more complete or worthy without needing it externally.      Objective #A (Patient Action)    Status: Continued - Date(s): 11/10/23    Patient will identify and  compliment positives at least 3 times daily to support positive self-image  Decrease frequency and intensity of feeling down, depressed, hopeless  Identify negative self-talk and behaviors: challenge core beliefs, myths, and actions  identify 5 traits or charcteristics you like about yourself  identify at least 3 self-care activities.    Intervention(s)  Therapist will assign homework related to target objective with the intent to promote pt autonomy and better manage MH.  Facilitate increase in self-awareness  Provide psycho-education on self-care/compassion and narrative therapy interventions  Teach/promote utilization of reframing and boundary setting    Objective #B Being more authentic with myself and others through improving my self-esteem.   Patient will participate in social activities to improve mood  learn & utilize at least 3 assertive communication skills weekly  identify 5 traits or charcteristics you like about yourself  identify at least 3 adaptive coping statements to counteract negative thoughts.    Status: Continued - Date(s): 11/10/23    Intervention(s)  Therapist will assign homework related to target objective with the intent to promote pt autonomy and better manage MH.  Facilitate increase in self-awareness  Provide psycho-education on self-esteem building and self-exploration  Teach/promote utilization of positive self-affirmations and critical observation skills    Patient has reviewed and agreed to the above plan.      LADONNA Paez  April 26, 2023

## 2024-02-16 ENCOUNTER — VIRTUAL VISIT (OUTPATIENT)
Dept: PSYCHOLOGY | Facility: CLINIC | Age: 29
End: 2024-02-16
Payer: COMMERCIAL

## 2024-02-16 DIAGNOSIS — F43.21 ADJUSTMENT DISORDER WITH DEPRESSED MOOD: Primary | ICD-10-CM

## 2024-02-16 PROCEDURE — 90837 PSYTX W PT 60 MINUTES: CPT | Mod: 95 | Performed by: COUNSELOR

## 2024-02-16 NOTE — PROGRESS NOTES
[facility]                                     Progress Note    Patient Name: Sofi Escobedo  Date: 2/16/24         Service Type: Individual      Session Start Time: 2:01 pm  Session End Time: 3:02 pm     Session Length: 61 minutes    Session #: 27    Attendees: Client attended alone    Service Modality:  Video Visit:      Provider verified identity through the following two step process.  Patient provided:  Patient is known previously to provider    Telemedicine Visit: The patient's condition can be safely assessed and treated via synchronous audio and visual telemedicine encounter.      Reason for Telemedicine Visit: Services only offered telehealth    Originating Site (Patient Location): Patient's home    Distant Site (Provider Location): Provider Remote Setting- Home Office    Consent:  The patient/guardian has verbally consented to: the potential risks and benefits of telemedicine (video visit) versus in person care; bill my insurance or make self-payment for services provided; and responsibility for payment of non-covered services.     Patient would like the video invitation sent by:  My Chart    Mode of Communication:  Video Conference via Amwell    Distant Location (Provider):  Off-site    As the provider I attest to compliance with applicable laws and regulations related to telemedicine.    DATA  Interactive Complexity: No  Crisis: No  Extended Session (53+ minutes): PROLONGED SERVICE IN THE OUTPATIENT SETTING REQUIRING DIRECT (FACE-TO-FACE) PATIENT CONTACT BEYOND THE USUAL SERVICE:    - Patient's presenting concerns require more intensive intervention than could be completed within the usual service    - Treatment protocol required additional time to complete, due to the nature of diagnosis being treated.  See Interventions section for details     Progress Since Last Session (Related to Symptoms / Goals / Homework):   Symptoms: Worsening pt has been feeling worse recently and  "more stressed    Homework: Partially completed      Episode of Care Goals: Satisfactory progress - PREPARATION (Decided to change - considering how); Intervened by negotiating a change plan and determining options / strategies for behavior change, identifying triggers, exploring social supports, and working towards setting a date to begin behavior change     Current / Ongoing Stressors and Concerns:   Pt is currently seeking therapy due to ongoing anxiety/depression and to better manage his temper. Since last session, pt reported that he has been feeling down, engaging in a lot of sleep, and noticed that he has been spinning his wheels. Pt reflected that looking at where he is at in his life he doesn't feel that it isn't up to the quality he wants it to be at. Pt verbalized feeling like her doesn't have purpose or meaning in his life, and lacks a sense of self-worth. Pt explored how he defines his own sense of worth, which mostly came to appearing successful to others and being able to do things without financial stress/worry. Pt also identified that FOMO has been a big part of what can make him feel down, which seems fueled by social media, which encourages him to compare himself to others. Pt agreed that taking a break from all social media might be a good idea. Pt reflected while thinking about it that he has a hard time thinking about what he is good at, and it is much easier for him to think about what he isn't good at. Pt was able to express some thing he is good at with encouragement after discussing it further. Pt expressed that this past week he just hasn't felt great in general and his \"jerk brain\" has been really focused on how he isn't achieving stuff. Pt reflected that while that is how he feels, he is able to recognize that things have changed for the better over the past 10 years for him because of the work he has done. Yet pt can see how his thoughts and mood really do make it hard for him to feel " motivated, especially when he is feeling stress. Pt processed through recent events at work, which have been very challenging as they have been a continuation of past patterns. Pt did apply for the supervisor position, and yet talked through how he has doubts that even if he got the position it may not resolve the core issues that are the sources of his job related stressors due to people not following policy. Pt did get the job offer for the other department which has been helpful in alleviating some of the stress/worry he has been dealing with. Session went longer then anticipated due to presenting concerns needing extra time to process through and trx plan being updated.     Personal Goals:  Good Credit   license  House  Be with a partner that is invested in pt and is financially independent     Treatment Objective(s) Addressed in This Session:   identify and compliment positives at least 3 times daily to support positive self-image  identify triggers, thoughts, and current stressors which contribute to feelings of anxiety  identify patterns of escalation  (i.e. tightness in chest, flushed face, increased heart rate, clenched hands, etc.)  identify at least 3 techniques for intervening on the escalation  use cognitive strategies identified in therapy to challenge anxious thoughts  Decrease frequency and intensity of feeling down, depressed, hopeless  Identify negative self-talk and behaviors: challenge core beliefs, myths, and actions  identify two areas of life that you would like to have improved functioning  identify at least 3 self-care activities     Intervention:   CBT: Reviewed recently behavior patterns and reinforcing cycle  Motivational Interviewing    MI Intervention: Co-Developed Goal: updated trx plan, Expressed Empathy/Understanding, Supported Autonomy, Collaboration, Evocation, Open-ended questions, Reflections: simple and complex, Change talk (evoked), and Reframe     Change Talk  Expressed by the Patient: Desire to change Ability to change Reasons to change Need to change Committment to change Activation    Provider Response to Change Talk: E - Evoked more info from patient about behavior change, A - Affirmed patient's thoughts, decisions, or attempts at behavior change, R - Reflected patient's change talk, and S - Summarized patient's change talk statements    Psychodynamic: Processed through internal-experiences related to anger and frustration management  Solution Focused: Identified immediate areas of concern and potential barriers to success in relationships    Assessments completed prior to visit:  PHQ2:       12/19/2023     9:58 AM 9/26/2023     9:48 AM 9/19/2023     9:30 AM 6/19/2023     4:01 PM 6/13/2023     8:13 AM 3/7/2023    11:25 AM 3/7/2023    11:24 AM   PHQ-2 ( 1999 [employer])   Q1: Little interest or pleasure in doing things 1 1 1 0 1 1 1   Q2: Feeling down, depressed or hopeless 1 1 1 0 0 0 0   PHQ-2 Score 2 2 2 0 1 1 1   Q1: Little interest or pleasure in doing things Several days Several days Several days Not at all Several days Several days Several days   Q2: Feeling down, depressed or hopeless Several days Several days Several days Not at all Not at all Not at all Not at all   PHQ-2 Score 2 2 2 0 1 1 1     PHQ9:       4/12/2023     4:37 PM 1/11/2024     4:02 PM   PHQ-9 SCORE   PHQ-9 Total Score MyChart 5 (Mild depression) 9 (Mild depression)   PHQ-9 Total Score 5 9     GAD2:       4/12/2023     4:53 PM 7/18/2023     8:25 AM 10/17/2023     9:53 AM 10/30/2023    12:07 PM 12/19/2023     9:58 AM   BISHOP-2   Feeling nervous, anxious, or on edge 1 0 1 1 1   Not being able to stop or control worrying 1 0 1 1 1   BISHOP-2 Total Score 2 0    0 2    2 2 2        ASSESSMENT: Current Emotional / Mental Status (status of significant symptoms):   Risk status (Self / Other harm or suicidal ideation)   Patient denies current fears or concerns for personal safety.   Patient denies current or  recent suicidal ideation or behaviors.   Patient denies current or recent homicidal ideation or behaviors.   Patient denies current or recent self injurious behavior or ideation.   Patient denies other safety concerns.   Patient reports there has been no change in risk factors since their last session.     Patient reports there has been no change in protective factors since their last session.     Recommended that patient call 911 or go to the local ED should there be a change in any of these risk factors.     Appearance:   Appropriate    Eye Contact:   Good    Psychomotor Behavior: Normal    Attitude:   Cooperative  Interested Friendly Pleasant   Orientation:   All   Speech    Rate / Production: Normal/ Responsive Talkative    Volume:  Normal    Mood:    Depressed  Normal   Affect:    Appropriate  Lethargic    Thought Content:  Clear    Thought Form:  Coherent  Logical    Insight:    Good  and Intellectual Insight     Medication Review:   No current psychiatric medications prescribed     Medication Compliance:   NA     Changes in Health Issues:   None reported     Chemical Use Review:   Substance Use: Chemical use reviewed, no active concerns identified      Tobacco Use: No change in amount of tobacco use since last session.  Provided encouragement to quit   Patient declined discussion at this time    Diagnosis:  1. Adjustment disorder with depressed mood          Collateral Reports Completed:   Not Applicable    PLAN: (Patient Tasks / Therapist Tasks / Other)  Get off social media for a week.  Make a list of strengths or things your good at  Look into a   Continue to abstain from seeking out women when feeling lonely  Get ex-girlfriend's stuff out of your apartment  [place]otin[place] Energizing/Empowering/feel-good Play-list of music  Make a list of reasons you don't work out      Judd Clemons [facility]                                                          ______________________________________________________________________    Individual Treatment Plan    Patient's Name: Sofi Escobedo  YOB: 1995    Date of Creation: 4/26/23  Date Treatment Plan Last Reviewed/Revised: 2/16/24    DSM5 Diagnoses: Adjustment Disorders  309.0 (F43.21) With depressed mood  Psychosocial / Contextual Factors: Pt recently moved to [place] from [place] and has been dealing with the culture shift. Pt has also been working on trying to get himself established here which has been challenging.  PROMIS (reviewed every 90 days):   PROMIS-10 Scores        10/30/2023    12:08 PM 2/2/2024     1:03 PM 2/16/2024     2:00 PM   PROMIS-10 Total Score w/o Sub Scores   PROMIS TOTAL - SUBSCORES 32 25    25 26    26       Referral / Collaboration:  Referral to another professional/service is not indicated at this time..    Anticipated number of session for this episode of care: 9-12 sessions  Anticipation frequency of session: Weekly  Anticipated Duration of each session: 38-52 minutes  Treatment plan will be reviewed in 90 days or when goals have been changed.       MeasurableTreatment Goal(s) related to diagnosis / functional impairment(s)  Goal 1: Patient will better manage his anger/frustration, not getting as easily aggravated, and not letting comments get to him as easily.    I will know I've met my goal when I am able to not let others get to me so much.      Objective #A (Patient Action)    Patient will identify triggers, thoughts, and current stressors which contribute to feelings of anxiety  identify patterns of escalation  (i.e. tightness in chest, flushed face, increased heart rate, clenched hands, etc.)  identify at least 3 techniques for intervening on the escalation  use cognitive strategies identified in therapy to challenge anxious thoughts  Increase interest, engagement, and pleasure in doing things  Decrease frequency and intensity of feeling down, depressed,  hopeless  Identify negative self-talk and behaviors: challenge core beliefs, myths, and actions  Improve concentration, focus, and mindfulness in daily activities   identify 3 coping strategies for improving mood  learn at least 3 self-regulation strategies.  Status: Continued - Date(s): 2/16/24    Intervention(s)  Therapist will assign homework related to target objective with the intent to promote pt autonomy and better manage MH.  Facilitate increase in self-awareness  Provide psycho-education on emotion identification/regulation and coping skills  Teach/promote utilization of mindfulness skills and grounding    Goal 2: Patient will feel worthy and be able to find validation internally.     I will know I've met my goal when able to feel more complete or worthy without needing it externally.      Objective #A (Patient Action)    Status: Continued - Date(s): 2/16/24    Patient will identify and compliment positives at least 3 times daily to support positive self-image  Decrease frequency and intensity of feeling down, depressed, hopeless  Identify negative self-talk and behaviors: challenge core beliefs, myths, and actions  identify 5 traits or charcteristics you like about yourself  identify at least 3 self-care activities.    Intervention(s)  Therapist will assign homework related to target objective with the intent to promote pt autonomy and better manage MH.  Facilitate increase in self-awareness  Provide psycho-education on self-care/compassion and narrative therapy interventions  Teach/promote utilization of reframing and boundary setting    Objective #B Being more authentic with myself and others through improving my self-esteem.   Patient will participate in social activities to improve mood  learn & utilize at least 3 assertive communication skills weekly  identify 5 traits or charcteristics you like about yourself  identify at least 3 adaptive coping statements to counteract negative thoughts.    Status:  Continued - Date(s): 2/16/24    Intervention(s)  Therapist will assign homework related to target objective with the intent to promote pt autonomy and better manage MH.  Facilitate increase in self-awareness  Provide psycho-education on self-esteem building and self-exploration  Teach/promote utilization of positive self-affirmations and critical observation skills    Patient has reviewed and agreed to the above plan.      Judd Clemons, [facility]  April 26, 2023

## 2024-02-23 ENCOUNTER — VIRTUAL VISIT (OUTPATIENT)
Dept: PSYCHOLOGY | Facility: CLINIC | Age: 29
End: 2024-02-23
Payer: COMMERCIAL

## 2024-02-23 DIAGNOSIS — F43.21 ADJUSTMENT DISORDER WITH DEPRESSED MOOD: Primary | ICD-10-CM

## 2024-02-23 PROCEDURE — 90837 PSYTX W PT 60 MINUTES: CPT | Mod: 95 | Performed by: COUNSELOR

## 2024-02-23 NOTE — PROGRESS NOTES
[facility]                                     Progress Note    Patient Name: Sofi Escobedo  Date: 2/23/24         Service Type: Individual      Session Start Time: 1:01 pm  Session End Time: 1:56 pm     Session Length: 55 minutes    Session #: 28    Attendees: Client attended alone    Service Modality:  Video Visit:      Provider verified identity through the following two step process.  Patient provided:  Patient is known previously to provider    Telemedicine Visit: The patient's condition can be safely assessed and treated via synchronous audio and visual telemedicine encounter.      Reason for Telemedicine Visit: Services only offered telehealth    Originating Site (Patient Location): Patient's home    Distant Site (Provider Location): Provider Remote Setting- Home Office    Consent:  The patient/guardian has verbally consented to: the potential risks and benefits of telemedicine (video visit) versus in person care; bill my insurance or make self-payment for services provided; and responsibility for payment of non-covered services.     Patient would like the video invitation sent by:  My Chart    Mode of Communication:  Video Conference via Amwell    Distant Location (Provider):  Off-site    As the provider I attest to compliance with applicable laws and regulations related to telemedicine.    DATA  Interactive Complexity: No  Crisis: No  Extended Session (53+ minutes): PROLONGED SERVICE IN THE OUTPATIENT SETTING REQUIRING DIRECT (FACE-TO-FACE) PATIENT CONTACT BEYOND THE USUAL SERVICE:    - Patient's presenting concerns require more intensive intervention than could be completed within the usual service     Progress Since Last Session (Related to Symptoms / Goals / Homework):   Symptoms: Worsening pt has been feeling worse recently and more stressed    Homework: Partially completed      Episode of Care Goals: Satisfactory progress - PREPARATION (Decided to change - considering  how); Intervened by negotiating a change plan and determining options / strategies for behavior change, identifying triggers, exploring social supports, and working towards setting a date to begin behavior change     Current / Ongoing Stressors and Concerns:   Pt is currently seeking therapy due to ongoing anxiety/depression and to better manage his temper. Since last session, pt reported that he was feeling better until recently when he was reminded that things are not going well at work. Pt processed through how work has been challenging this past week, reflecting that he had some interactions where he felt dismissed and targeted. Pt clarified that recently new rules that go against current policies have been implemented, and the policies were never updated to support them. This is causing him some internal tension because it places him in a loose loose situation when trying to do his job. Due to this pt had a disagreement with a co-worker, which ended up in mediation when he brought it up to leadership because of the impact it was having on his ability to do his job. During the mediation pt felt that his concerns were dismissed and it was proven to him that he can't stay in the department anymore. Pt is able to recognize that a lot of these issues are systemic ones, and many of these issues are not caused by him. Pt is thinking that given the current attitude and environment at work, he feels he just needs to navigate work until he can complete his transfer to another department that he was able to get hired at recently in two months. Pt expressed feeling a lot of frustration around the situation and feeling like he was being disrespected by his supervisors because they don't actually understand the job they are supervising which he is willing to bring concerns forward due to safety worries. Pt discussed how he otherwise has been doing fine outside of work and until these incidents at work, he made it four days  without social media, and during that time pt was able to see that his mood was better. Pt ended up returning to social media as a coping skill given the higher stress he has been dealing with. Since this experience pt was able to see that how he engages in social media does impact him more then he thought. Pt has now decided to use it as a form of entertainment instead of keeping up with friends or trying to find dating partners. Pt is also finding that he is feeling more comfortable with focusing on himself and being less motivated in finding someone to get emotional validation from. Session went longer then anticipated due to presenting concerns needing extra time to process through and trx plan being updated.     Personal Goals:  Good Credit   license  House  Be with a partner that is invested in pt and is financially independent     Treatment Objective(s) Addressed in This Session:   identify and compliment positives at least 3 times daily to support positive self-image  identify triggers, thoughts, and current stressors which contribute to feelings of anxiety  identify patterns of escalation  (i.e. tightness in chest, flushed face, increased heart rate, clenched hands, etc.)  identify at least 3 techniques for intervening on the escalation  use cognitive strategies identified in therapy to challenge anxious thoughts  Decrease frequency and intensity of feeling down, depressed, hopeless  Identify negative self-talk and behaviors: challenge core beliefs, myths, and actions  identify two areas of life that you would like to have improved functioning  identify at least 3 self-care activities     Intervention:   CBT: Reviewed recently behavior patterns and reinforcing cycle  Motivational Interviewing    MI Intervention: Expressed Empathy/Understanding, Supported Autonomy, Collaboration, Evocation, Open-ended questions, Reflections: simple and complex, Change talk (evoked), and Reframe     Change  Talk Expressed by the Patient: Desire to change Ability to change Reasons to change Need to change Committment to change Activation    Provider Response to Change Talk: E - Evoked more info from patient about behavior change, A - Affirmed patient's thoughts, decisions, or attempts at behavior change, R - Reflected patient's change talk, and S - Summarized patient's change talk statements    Psychodynamic: Processed through internal-experiences related to anger and frustration management  Solution Focused: Identified immediate areas of concern and potential barriers to success in relationships    Assessments completed prior to visit:  PHQ2:       12/19/2023     9:58 AM 9/26/2023     9:48 AM 9/19/2023     9:30 AM 6/19/2023     4:01 PM 6/13/2023     8:13 AM 3/7/2023    11:25 AM 3/7/2023    11:24 AM   PHQ-2 ( 1999 [employer])   Q1: Little interest or pleasure in doing things 1 1 1 0 1 1 1   Q2: Feeling down, depressed or hopeless 1 1 1 0 0 0 0   PHQ-2 Score 2 2 2 0 1 1 1   Q1: Little interest or pleasure in doing things Several days Several days Several days Not at all Several days Several days Several days   Q2: Feeling down, depressed or hopeless Several days Several days Several days Not at all Not at all Not at all Not at all   PHQ-2 Score 2 2 2 0 1 1 1     PHQ9:       4/12/2023     4:37 PM 1/11/2024     4:02 PM   PHQ-9 SCORE   PHQ-9 Total Score MyChart 5 (Mild depression) 9 (Mild depression)   PHQ-9 Total Score 5 9     GAD2:       4/12/2023     4:53 PM 7/18/2023     8:25 AM 10/17/2023     9:53 AM 10/30/2023    12:07 PM 12/19/2023     9:58 AM   BISHOP-2   Feeling nervous, anxious, or on edge 1 0 1 1 1   Not being able to stop or control worrying 1 0 1 1 1   BISHOP-2 Total Score 2 0    0 2    2 2 2        ASSESSMENT: Current Emotional / Mental Status (status of significant symptoms):   Risk status (Self / Other harm or suicidal ideation)   Patient denies current fears or concerns for personal safety.   Patient denies current  or recent suicidal ideation or behaviors.   Patient denies current or recent homicidal ideation or behaviors.   Patient denies current or recent self injurious behavior or ideation.   Patient denies other safety concerns.   Patient reports there has been no change in risk factors since their last session.     Patient reports there has been no change in protective factors since their last session.     Recommended that patient call 911 or go to the local ED should there be a change in any of these risk factors.     Appearance:   Appropriate    Eye Contact:   Good    Psychomotor Behavior: Normal    Attitude:   Cooperative  Interested Friendly Pleasant   Orientation:   All   Speech    Rate / Production: Normal/ Responsive Talkative    Volume:  Normal    Mood:    Depressed  Normal frustrated   Affect:    Appropriate    Thought Content:  Clear    Thought Form:  Coherent  Logical    Insight:    Good  and Intellectual Insight     Medication Review:   No current psychiatric medications prescribed     Medication Compliance:   NA     Changes in Health Issues:   None reported     Chemical Use Review:   Substance Use: Chemical use reviewed, no active concerns identified      Tobacco Use: No change in amount of tobacco use since last session.  Provided encouragement to quit   Patient declined discussion at this time    Diagnosis:  1. Adjustment disorder with depressed mood        Collateral Reports Completed:   Not Applicable    PLAN: (Patient Tasks / Therapist Tasks / Other)  Continue to limit social media  Look into a   Continue to abstain from seeking out women when feeling lonely  [place] Energizing/Empowering/feel-good Play-list of music  Avoid conflict at work and focus on self/responsibilities     Judd Clemons [facility]                                                         ______________________________________________________________________    Individual Treatment Plan    Patient's Name: Sofi Gatica  Gregg  YOB: 1995    Date of Creation: 4/26/23  Date Treatment Plan Last Reviewed/Revised: 2/16/24    DSM5 Diagnoses: Adjustment Disorders  309.0 (F43.21) With depressed mood  Psychosocial / Contextual Factors: Pt recently moved to [place] from [place] and has been dealing with the culture shift. Pt has also been working on trying to get himself established here which has been challenging.  PROMIS (reviewed every 90 days):   PROMIS-10 Scores        10/30/2023    12:08 PM 2/2/2024     1:03 PM 2/16/2024     2:00 PM   PROMIS-10 Total Score w/o Sub Scores   PROMIS TOTAL - SUBSCORES 32 25    25 26    26       Referral / Collaboration:  Referral to another professional/service is not indicated at this time..    Anticipated number of session for this episode of care: 9-12 sessions  Anticipation frequency of session: Weekly  Anticipated Duration of each session: 38-52 minutes  Treatment plan will be reviewed in 90 days or when goals have been changed.       MeasurableTreatment Goal(s) related to diagnosis / functional impairment(s)  Goal 1: Patient will better manage his anger/frustration, not getting as easily aggravated, and not letting comments get to him as easily.    I will know I've met my goal when I am able to not let others get to me so much.      Objective #A (Patient Action)    Patient will identify triggers, thoughts, and current stressors which contribute to feelings of anxiety  identify patterns of escalation  (i.e. tightness in chest, flushed face, increased heart rate, clenched hands, etc.)  identify at least 3 techniques for intervening on the escalation  use cognitive strategies identified in therapy to challenge anxious thoughts  Increase interest, engagement, and pleasure in doing things  Decrease frequency and intensity of feeling down, depressed, hopeless  Identify negative self-talk and behaviors: challenge core beliefs, myths, and actions  Improve concentration, focus, and mindfulness in daily  activities   identify 3 coping strategies for improving mood  learn at least 3 self-regulation strategies.  Status: Continued - Date(s): 2/16/24    Intervention(s)  Therapist will assign homework related to target objective with the intent to promote pt autonomy and better manage MH.  Facilitate increase in self-awareness  Provide psycho-education on emotion identification/regulation and coping skills  Teach/promote utilization of mindfulness skills and grounding    Goal 2: Patient will feel worthy and be able to find validation internally.     I will know I've met my goal when able to feel more complete or worthy without needing it externally.      Objective #A (Patient Action)    Status: Continued - Date(s): 2/16/24    Patient will identify and compliment positives at least 3 times daily to support positive self-image  Decrease frequency and intensity of feeling down, depressed, hopeless  Identify negative self-talk and behaviors: challenge core beliefs, myths, and actions  identify 5 traits or charcteristics you like about yourself  identify at least 3 self-care activities.    Intervention(s)  Therapist will assign homework related to target objective with the intent to promote pt autonomy and better manage MH.  Facilitate increase in self-awareness  Provide psycho-education on self-care/compassion and narrative therapy interventions  Teach/promote utilization of reframing and boundary setting    Objective #B Being more authentic with myself and others through improving my self-esteem.   Patient will participate in social activities to improve mood  learn & utilize at least 3 assertive communication skills weekly  identify 5 traits or charcteristics you like about yourself  identify at least 3 adaptive coping statements to counteract negative thoughts.    Status: Continued - Date(s): 2/16/24    Intervention(s)  Therapist will assign homework related to target objective with the intent to promote pt autonomy and  better manage MH.  Facilitate increase in self-awareness  Provide psycho-education on self-esteem building and self-exploration  Teach/promote utilization of positive self-affirmations and critical observation skills    Patient has reviewed and agreed to the above plan.      Judd Clemons, [facility]  April 26, 2023

## 2024-03-01 ENCOUNTER — VIRTUAL VISIT (OUTPATIENT)
Dept: PSYCHOLOGY | Facility: CLINIC | Age: 29
End: 2024-03-01
Payer: COMMERCIAL

## 2024-03-01 DIAGNOSIS — F43.21 ADJUSTMENT DISORDER WITH DEPRESSED MOOD: Primary | ICD-10-CM

## 2024-03-01 PROCEDURE — 90837 PSYTX W PT 60 MINUTES: CPT | Mod: 95 | Performed by: COUNSELOR

## 2024-03-01 NOTE — PROGRESS NOTES
[facility]                                     Progress Note    Patient Name: Sofi Escobedo  Date: 3/01/24         Service Type: Individual      Session Start Time: 1:04 pm  Session End Time: 1:59 pm     Session Length: 55 minutes    Session #: 29    Attendees: Client attended alone    Service Modality:  Video Visit:      Provider verified identity through the following two step process.  Patient provided:  Patient is known previously to provider    Telemedicine Visit: The patient's condition can be safely assessed and treated via synchronous audio and visual telemedicine encounter.      Reason for Telemedicine Visit: Services only offered telehealth    Originating Site (Patient Location): Patient's home    Distant Site (Provider Location): Provider Remote Setting- Home Office    Consent:  The patient/guardian has verbally consented to: the potential risks and benefits of telemedicine (video visit) versus in person care; bill my insurance or make self-payment for services provided; and responsibility for payment of non-covered services.     Patient would like the video invitation sent by:  My Chart    Mode of Communication:  Video Conference via [employer]well    Distant Location (Provider):  Off-site    As the provider I attest to compliance with applicable laws and regulations related to telemedicine.    DATA  Interactive Complexity: No  Crisis: No  Extended Session (53+ minutes): PROLONGED SERVICE IN THE OUTPATIENT SETTING REQUIRING DIRECT (FACE-TO-FACE) PATIENT CONTACT BEYOND THE USUAL SERVICE:    - Patient's presenting concerns require more intensive intervention than could be completed within the usual service     Progress Since Last Session (Related to Symptoms / Goals / Homework):   Symptoms: No change pt continues to feel stress and down    Homework: Partially completed      Episode of Care Goals: Satisfactory progress - PREPARATION (Decided to change - considering how); Intervened  by negotiating a change plan and determining options / strategies for behavior change, identifying triggers, exploring social supports, and working towards setting a date to begin behavior change     Current / Ongoing Stressors and Concerns:   Pt is currently seeking therapy due to ongoing anxiety/depression and to better manage his temper. Since last session, pt reported that his older sister is in the state and they are planning to visit, which he is looking forward to. Pt is dealing with some financial stress, and has recently had to deal with a bunch of bills at once which has tapped him out. 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 Pt's work stress has been slightly better and yet he got moved out of his original department into one he isn't sure about. Pt processed through feeling lost in his life and feeling sad/disappointed in himself. Pt reflected that he feels lonely and like no one cares about his well being much of the time. Furthermore, pt feels like he should have accomplished a lot more by now in his life and is uncertain how to reach his goals. Pt expressed that he questions if he is of normal intellegence or if there is something wrong with him because much of the time he feels like he doesn't know how to navigate life. Pt clarified that he had an IEP growing up which is where this thought came from. Pt was tearful when talking about this and was able to acknowledge that there are systemic issues outside of himself that haven't set him up for success. Pt also dicussed how he has recently been thinking about his grandparents, whom have all passed, including having dream related to them. Pt recounted how his expeirences growing up with them greatly influence him and he feels like he is disappointing them with how his life has been going. Pt reflected on what is  grandfather might say if he asked him about certain things and got answers that were insightful to his situation. Session went longer then anticipated due to presenting concerns needing extra time to process through.     Personal Goals:  Good Credit   license  House  Be with a partner that is invested in pt and is financially independent     Treatment Objective(s) Addressed in This Session:   identify and compliment positives at least 3 times daily to support positive self-image  identify triggers, thoughts, and current stressors which contribute to feelings of anxiety  identify patterns of escalation  (i.e. tightness in chest, flushed face, increased heart rate, clenched hands, etc.)  identify at least 3 techniques for intervening on the escalation  use cognitive strategies identified in therapy to challenge anxious thoughts  Decrease frequency and intensity of feeling down, depressed, hopeless  Identify negative self-talk and behaviors: challenge core beliefs, myths, and actions  identify two areas of life that you would like to have improved functioning  identify at least 3 self-care activities     Intervention:   CBT: Reviewed recently behavior patterns and reinforcing cycle  Motivational Interviewing    MI Intervention: Expressed Empathy/Understanding, Supported Autonomy, Collaboration, Evocation, Open-ended questions, Reflections: simple and complex, Change talk (evoked), and Reframe     Change Talk Expressed by the Patient: Desire to change Ability to change Reasons to change Need to change Committment to change Activation    Provider Response to Change Talk: E - Evoked more info from patient about behavior change, A - Affirmed patient's thoughts, decisions, or attempts at behavior change, R - Reflected patient's change talk, and S - Summarized patient's change talk statements    Psychodynamic: Processed through internal-experiences related to anger and frustration management  Solution  Focused: Identified immediate areas of concern and potential barriers to success in relationships    Assessments completed prior to visit:  PHQ2:       12/19/2023     9:58 AM 9/26/2023     9:48 AM 9/19/2023     9:30 AM 6/19/2023     4:01 PM 6/13/2023     8:13 AM 3/7/2023    11:25 AM 3/7/2023    11:24 AM   PHQ-2 ( 1999 [employer])   Q1: Little interest or pleasure in doing things 1 1 1 0 1 1 1   Q2: Feeling down, depressed or hopeless 1 1 1 0 0 0 0   PHQ-2 Score 2 2 2 0 1 1 1   Q1: Little interest or pleasure in doing things Several days Several days Several days Not at all Several days Several days Several days   Q2: Feeling down, depressed or hopeless Several days Several days Several days Not at all Not at all Not at all Not at all   PHQ-2 Score 2 2 2 0 1 1 1     PHQ9:       4/12/2023     4:37 PM 1/11/2024     4:02 PM   PHQ-9 SCORE   PHQ-9 Total Score MyChart 5 (Mild depression) 9 (Mild depression)   PHQ-9 Total Score 5 9     GAD2:       4/12/2023     4:53 PM 7/18/2023     8:25 AM 10/17/2023     9:53 AM 10/30/2023    12:07 PM 12/19/2023     9:58 AM   BISHOP-2   Feeling nervous, anxious, or on edge 1 0 1 1 1   Not being able to stop or control worrying 1 0 1 1 1   BISHOP-2 Total Score 2 0    0 2    2 2 2        ASSESSMENT: Current Emotional / Mental Status (status of significant symptoms):   Risk status (Self / Other harm or suicidal ideation)   Patient denies current fears or concerns for personal safety.   Patient denies current or recent suicidal ideation or behaviors.   Patient denies current or recent homicidal ideation or behaviors.   Patient denies current or recent self injurious behavior or ideation.   Patient denies other safety concerns.   Patient reports there has been no change in risk factors since their last session.     Patient reports there has been no change in protective factors since their last session.     Recommended that patient call 911 or go to the local ED should there be a change in any of these  risk factors.     Appearance:   Appropriate    Eye Contact:   Good    Psychomotor Behavior: Normal    Attitude:   Cooperative  Interested Friendly Pleasant   Orientation:   All   Speech    Rate / Production: Normal/ Responsive Talkative    Volume:  Normal    Mood:    Depressed  Normal Sad    Affect:    Appropriate  Tearful   Thought Content:  Clear    Thought Form:  Coherent  Logical    Insight:    Fair  and Intellectual Insight     Medication Review:   No current psychiatric medications prescribed     Medication Compliance:   NA     Changes in Health Issues:   None reported     Chemical Use Review:   Substance Use: Chemical use reviewed, no active concerns identified      Tobacco Use: No change in amount of tobacco use since last session.  Provided encouragement to quit   Patient declined discussion at this time    Diagnosis:  1. Adjustment disorder with depressed mood      Collateral Reports Completed:   Not Applicable    PLAN: (Patient Tasks / Therapist Tasks / Other)  Need vs want list in a partner  Youtube credit explanation   Write a letter to your grand-father (Sebastian)  Make a list of what is valuable to you (extra credit)    Judd Clemons, [facility]                                                         ______________________________________________________________________    Individual Treatment Plan    Patient's Name: Sofi Escobedo  YOB: 1995    Date of Creation: 4/26/23  Date Treatment Plan Last Reviewed/Revised: 2/16/24    DSM5 Diagnoses: Adjustment Disorders  309.0 (F43.21) With depressed mood  Psychosocial / Contextual Factors: Pt recently moved to [place] from [place] and has been dealing with the culture shift. Pt has also been working on trying to get himself established here which has been challenging.  PROMIS (reviewed every 90 days):   PROMIS-10 Scores        2/2/2024     1:03 PM 2/16/2024     2:00 PM 3/1/2024     9:19 AM   PROMIS-10 Total Score w/o Sub Scores   PROMIS TOTAL -  SUBSCORES 25    25 26    26 29    29       Referral / Collaboration:  Referral to another professional/service is not indicated at this time..    Anticipated number of session for this episode of care: 9-12 sessions  Anticipation frequency of session: Weekly  Anticipated Duration of each session: 38-52 minutes  Treatment plan will be reviewed in 90 days or when goals have been changed.       MeasurableTreatment Goal(s) related to diagnosis / functional impairment(s)  Goal 1: Patient will better manage his anger/frustration, not getting as easily aggravated, and not letting comments get to him as easily.    I will know I've met my goal when I am able to not let others get to me so much.      Objective #A (Patient Action)    Patient will identify triggers, thoughts, and current stressors which contribute to feelings of anxiety  identify patterns of escalation  (i.e. tightness in chest, flushed face, increased heart rate, clenched hands, etc.)  identify at least 3 techniques for intervening on the escalation  use cognitive strategies identified in therapy to challenge anxious thoughts  Increase interest, engagement, and pleasure in doing things  Decrease frequency and intensity of feeling down, depressed, hopeless  Identify negative self-talk and behaviors: challenge core beliefs, myths, and actions  Improve concentration, focus, and mindfulness in daily activities   identify 3 coping strategies for improving mood  learn at least 3 self-regulation strategies.  Status: Continued - Date(s): 2/16/24    Intervention(s)  Therapist will assign homework related to target objective with the intent to promote pt autonomy and better manage MH.  Facilitate increase in self-awareness  Provide psycho-education on emotion identification/regulation and coping skills  Teach/promote utilization of mindfulness skills and grounding    Goal 2: Patient will feel worthy and be able to find validation internally.     I will know I've met my  goal when able to feel more complete or worthy without needing it externally.      Objective #A (Patient Action)    Status: Continued - Date(s): 2/16/24    Patient will identify and compliment positives at least 3 times daily to support positive self-image  Decrease frequency and intensity of feeling down, depressed, hopeless  Identify negative self-talk and behaviors: challenge core beliefs, myths, and actions  identify 5 traits or charcteristics you like about yourself  identify at least 3 self-care activities.    Intervention(s)  Therapist will assign homework related to target objective with the intent to promote pt autonomy and better manage MH.  Facilitate increase in self-awareness  Provide psycho-education on self-care/compassion and narrative therapy interventions  Teach/promote utilization of reframing and boundary setting    Objective #B Being more authentic with myself and others through improving my self-esteem.   Patient will participate in social activities to improve mood  learn & utilize at least 3 assertive communication skills weekly  identify 5 traits or charcteristics you like about yourself  identify at least 3 adaptive coping statements to counteract negative thoughts.    Status: Continued - Date(s): 2/16/24    Intervention(s)  Therapist will assign homework related to target objective with the intent to promote pt autonomy and better manage MH.  Facilitate increase in self-awareness  Provide psycho-education on self-esteem building and self-exploration  Teach/promote utilization of positive self-affirmations and critical observation skills    Patient has reviewed and agreed to the above plan.      Judd Clemons, [facility]  April 26, 2023

## 2024-03-08 ENCOUNTER — VIRTUAL VISIT (OUTPATIENT)
Dept: PSYCHOLOGY | Facility: CLINIC | Age: 29
End: 2024-03-08
Payer: COMMERCIAL

## 2024-03-08 DIAGNOSIS — F43.21 ADJUSTMENT DISORDER WITH DEPRESSED MOOD: Primary | ICD-10-CM

## 2024-03-08 PROCEDURE — 90837 PSYTX W PT 60 MINUTES: CPT | Mod: 95 | Performed by: COUNSELOR

## 2024-03-08 NOTE — PROGRESS NOTES
[facility]                                     Progress Note    Patient Name: Sofi Escobedo  Date: 3/08/24         Service Type: Individual      Session Start Time: 1:02 pm  Session End Time: 1:58 pm     Session Length: 56 minutes    Session #: 30    Attendees: Client attended alone    Service Modality:  Video Visit:      Provider verified identity through the following two step process.  Patient provided:  Patient is known previously to provider    Telemedicine Visit: The patient's condition can be safely assessed and treated via synchronous audio and visual telemedicine encounter.      Reason for Telemedicine Visit: Services only offered telehealth    Originating Site (Patient Location): Patient's home    Distant Site (Provider Location): Provider Remote Setting- Home Office    Consent:  The patient/guardian has verbally consented to: the potential risks and benefits of telemedicine (video visit) versus in person care; bill my insurance or make self-payment for services provided; and responsibility for payment of non-covered services.     Patient would like the video invitation sent by:  My Chart    Mode of Communication:  Video Conference via [employer]    Distant Location (Provider):  Off-site    As the provider I attest to compliance with applicable laws and regulations related to telemedicine.    DATA  Interactive Complexity: No  Crisis: No  Extended Session (53+ minutes): PROLONGED SERVICE IN THE OUTPATIENT SETTING REQUIRING DIRECT (FACE-TO-FACE) PATIENT CONTACT BEYOND THE USUAL SERVICE:    - Patient's presenting concerns require more intensive intervention than could be completed within the usual service     Progress Since Last Session (Related to Symptoms / Goals / Homework):   Symptoms: Improving pt is feeling more confident and less down    Homework: Partially completed      Episode of Care Goals: Satisfactory progress - ACTION (Actively working towards change); Intervened by  reinforcing change plan / affirming steps taken     Current / Ongoing Stressors and Concerns:   Pt is currently seeking therapy due to ongoing anxiety/depression and to better manage his temper. Since last session, pt reported that he had a psych eval for his new position and felt that while it went better then not, though did feel that some of it was too personal. Pt expressed thinking that it went overall well, though learned that he needs some documentation from his therapist (writer) to finish it, which surprised him. Pt also endorsed feeling some anxiety while in the interview, but acknowledged that he tends to get overly nervous in interviews generally. Pt processed through home work, admitting that he didn't get a lot of it done, but the stuff he started did seem to help. Pt discussed how things have been going at work, noting that things seem to have changed for the positive. 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 Pt thinks that management is starting to realize how he is an asset and has started to be more vocal about this. 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 Pt spent time with his sister, which was a fun time and he was glad he got to spend some family outside of [place]. Pt did end up spending some time with his ex, and yet was able to just be friends with nothing else. Pt thought it was a good experience and is starting to think he's in a place where he doesn't needs to go back into that relationship romantically now which was reaffirming to him. This has gotten pt to start considering exploring the idea of dating, but continues to focus on himself and his ability to communicate/connect instead of prioritizing intimacy, which is a change. After these recent events pt is feeling  more self-confident and in a better place then last week. Session went longer then anticipated due to presenting concerns needing extra time to process through.     Personal Goals:  Good Credit   license  House  Be with a partner that is invested in pt and is financially independent     Treatment Objective(s) Addressed in This Session:   identify and compliment positives at least 3 times daily to support positive self-image  identify triggers, thoughts, and current stressors which contribute to feelings of anxiety  identify patterns of escalation  (i.e. tightness in chest, flushed face, increased heart rate, clenched hands, etc.)  identify at least 3 techniques for intervening on the escalation  use cognitive strategies identified in therapy to challenge anxious thoughts  Decrease frequency and intensity of feeling down, depressed, hopeless  Identify negative self-talk and behaviors: challenge core beliefs, myths, and actions  identify two areas of life that you would like to have improved functioning  identify at least 3 self-care activities     Intervention:   CBT: Reviewed recently behavior patterns and environmental factors that impact them    Motivational Interviewing    MI Intervention: Expressed Empathy/Understanding, Supported Autonomy, Collaboration, Evocation, Open-ended questions, Reflections: simple and complex, Change talk (evoked), and Reframe     Change Talk Expressed by the Patient: Desire to change Ability to change Reasons to change Need to change Committment to change Activation    Provider Response to Change Talk: E - Evoked more info from patient about behavior change, A - Affirmed patient's thoughts, decisions, or attempts at behavior change, R - Reflected patient's change talk, and S - Summarized patient's change talk statements    Psychodynamic: Processed through internal-experiences related to interpersonal relationships  Solution Focused: Identified self-imposed barriers and  how he has worked to overcome them    Assessments completed prior to visit:  PHQ2:       12/19/2023     9:58 AM 9/26/2023     9:48 AM 9/19/2023     9:30 AM 6/19/2023     4:01 PM 6/13/2023     8:13 AM 3/7/2023    11:25 AM 3/7/2023    11:24 AM   PHQ-2 ( 1999 [employer])   Q1: Little interest or pleasure in doing things 1 1 1 0 1 1 1   Q2: Feeling down, depressed or hopeless 1 1 1 0 0 0 0   PHQ-2 Score 2 2 2 0 1 1 1   Q1: Little interest or pleasure in doing things Several days Several days Several days Not at all Several days Several days Several days   Q2: Feeling down, depressed or hopeless Several days Several days Several days Not at all Not at all Not at all Not at all   PHQ-2 Score 2 2 2 0 1 1 1     PHQ9:       4/12/2023     4:37 PM 1/11/2024     4:02 PM   PHQ-9 SCORE   PHQ-9 Total Score MyChart 5 (Mild depression) 9 (Mild depression)   PHQ-9 Total Score 5 9     GAD2:       4/12/2023     4:53 PM 7/18/2023     8:25 AM 10/17/2023     9:53 AM 10/30/2023    12:07 PM 12/19/2023     9:58 AM   BISHOP-2   Feeling nervous, anxious, or on edge 1 0 1 1 1   Not being able to stop or control worrying 1 0 1 1 1   BISHOP-2 Total Score 2 0    0 2    2 2 2        ASSESSMENT: Current Emotional / Mental Status (status of significant symptoms):   Risk status (Self / Other harm or suicidal ideation)   Patient denies current fears or concerns for personal safety.   Patient denies current or recent suicidal ideation or behaviors.   Patient denies current or recent homicidal ideation or behaviors.   Patient denies current or recent self injurious behavior or ideation.   Patient denies other safety concerns.   Patient reports there has been no change in risk factors since their last session.     Patient reports there has been no change in protective factors since their last session.     Recommended that patient call 911 or go to the local ED should there be a change in any of these risk factors.     Appearance:   Appropriate    Eye  Contact:   Good    Psychomotor Behavior: Normal    Attitude:   Cooperative  Interested Friendly Pleasant   Orientation:   All   Speech    Rate / Production: Normal/ Responsive Talkative    Volume:  Normal    Mood:    Anxious  Normal   Affect:    Appropriate    Thought Content:  Clear    Thought Form:  Coherent  Logical    Insight:    Good  and Intellectual Insight     Medication Review:   No current psychiatric medications prescribed     Medication Compliance:   NA     Changes in Health Issues:   None reported     Chemical Use Review:   Substance Use: Chemical use reviewed, no active concerns identified      Tobacco Use: No change in amount of tobacco use since last session.  Provided encouragement to quit   Patient declined discussion at this time    Diagnosis:  1. Adjustment disorder with depressed mood      Collateral Reports Completed:   Not Applicable    PLAN: (Patient Tasks / Therapist Tasks / Other)  Need vs want list in a partner  [employer] steps to work on fixing credit  Write a letter to your grand-father (Sebastian)  Make a list of what is valuable to you   Fax paperwork     LADONNA Paez                                                         ______________________________________________________________________    Individual Treatment Plan    Patient's Name: Sofi Escobedo  YOB: 1995    Date of Creation: 4/26/23  Date Treatment Plan Last Reviewed/Revised: 2/16/24    DSM5 Diagnoses: Adjustment Disorders  309.0 (F43.21) With depressed mood  Psychosocial / Contextual Factors: Pt recently moved to [place] from [place] and has been dealing with the culture shift. Pt has also been working on trying to get himself established here which has been challenging.  PROMIS (reviewed every 90 days):   PROMIS-10 Scores        2/2/2024     1:03 PM 2/16/2024     2:00 PM 3/1/2024     9:19 AM   PROMIS-10 Total Score w/o Sub Scores   PROMIS TOTAL - SUBSCORES 25    25 26    26 29    29       Referral /  Collaboration:  Referral to another professional/service is not indicated at this time..    Anticipated number of session for this episode of care: 9-12 sessions  Anticipation frequency of session: Weekly  Anticipated Duration of each session: 38-52 minutes  Treatment plan will be reviewed in 90 days or when goals have been changed.       MeasurableTreatment Goal(s) related to diagnosis / functional impairment(s)  Goal 1: Patient will better manage his anger/frustration, not getting as easily aggravated, and not letting comments get to him as easily.    I will know I've met my goal when I am able to not let others get to me so much.      Objective #A (Patient Action)    Patient will identify triggers, thoughts, and current stressors which contribute to feelings of anxiety  identify patterns of escalation  (i.e. tightness in chest, flushed face, increased heart rate, clenched hands, etc.)  identify at least 3 techniques for intervening on the escalation  use cognitive strategies identified in therapy to challenge anxious thoughts  Increase interest, engagement, and pleasure in doing things  Decrease frequency and intensity of feeling down, depressed, hopeless  Identify negative self-talk and behaviors: challenge core beliefs, myths, and actions  Improve concentration, focus, and mindfulness in daily activities   identify 3 coping strategies for improving mood  learn at least 3 self-regulation strategies.  Status: Continued - Date(s): 2/16/24    Intervention(s)  Therapist will assign homework related to target objective with the intent to promote pt autonomy and better manage MH.  Facilitate increase in self-awareness  Provide psycho-education on emotion identification/regulation and coping skills  Teach/promote utilization of mindfulness skills and grounding    Goal 2: Patient will feel worthy and be able to find validation internally.     I will know I've met my goal when able to feel more complete or worthy without  needing it externally.      Objective #A (Patient Action)    Status: Continued - Date(s): 2/16/24    Patient will identify and compliment positives at least 3 times daily to support positive self-image  Decrease frequency and intensity of feeling down, depressed, hopeless  Identify negative self-talk and behaviors: challenge core beliefs, myths, and actions  identify 5 traits or charcteristics you like about yourself  identify at least 3 self-care activities.    Intervention(s)  Therapist will assign homework related to target objective with the intent to promote pt autonomy and better manage MH.  Facilitate increase in self-awareness  Provide psycho-education on self-care/compassion and narrative therapy interventions  Teach/promote utilization of reframing and boundary setting    Objective #B Being more authentic with myself and others through improving my self-esteem.   Patient will participate in social activities to improve mood  learn & utilize at least 3 assertive communication skills weekly  identify 5 traits or charcteristics you like about yourself  identify at least 3 adaptive coping statements to counteract negative thoughts.    Status: Continued - Date(s): 2/16/24    Intervention(s)  Therapist will assign homework related to target objective with the intent to promote pt autonomy and better manage MH.  Facilitate increase in self-awareness  Provide psycho-education on self-esteem building and self-exploration  Teach/promote utilization of positive self-affirmations and critical observation skills    Patient has reviewed and agreed to the above plan.      Judd Clemons, [facility]  April 26, 2023

## 2024-03-15 ENCOUNTER — VIRTUAL VISIT (OUTPATIENT)
Dept: PSYCHOLOGY | Facility: CLINIC | Age: 29
End: 2024-03-15
Payer: COMMERCIAL

## 2024-03-15 DIAGNOSIS — F43.21 ADJUSTMENT DISORDER WITH DEPRESSED MOOD: Primary | ICD-10-CM

## 2024-03-15 PROCEDURE — 90837 PSYTX W PT 60 MINUTES: CPT | Mod: 95 | Performed by: COUNSELOR

## 2024-03-15 NOTE — PROGRESS NOTES
[facility]                                     Progress Note    Patient Name: Sofi Escobedo  Date: 3/15/24         Service Type: Individual      Session Start Time: 1:02 pm  Session End Time: 1:56 pm     Session Length: 54 minutes    Session #: 31    Attendees: Client attended alone    Service Modality:  Video Visit:      Provider verified identity through the following two step process.  Patient provided:  Patient is known previously to provider    Telemedicine Visit: The patient's condition can be safely assessed and treated via synchronous audio and visual telemedicine encounter.      Reason for Telemedicine Visit: Services only offered telehealth    Originating Site (Patient Location): Patient's home    Distant Site (Provider Location): Provider Remote Setting- Home Office    Consent:  The patient/guardian has verbally consented to: the potential risks and benefits of telemedicine (video visit) versus in person care; bill my insurance or make self-payment for services provided; and responsibility for payment of non-covered services.     Patient would like the video invitation sent by:  My Chart    Mode of Communication:  Video Conference via [employer]well    Distant Location (Provider):  Off-site    As the provider I attest to compliance with applicable laws and regulations related to telemedicine.    DATA  Interactive Complexity: No  Crisis: No  Extended Session (53+ minutes): PROLONGED SERVICE IN THE OUTPATIENT SETTING REQUIRING DIRECT (FACE-TO-FACE) PATIENT CONTACT BEYOND THE USUAL SERVICE:    - Patient's presenting concerns require more intensive intervention than could be completed within the usual service     Progress Since Last Session (Related to Symptoms / Goals / Homework):   Symptoms: Improving pt is feeling more confident and less down    Homework: Partially completed      Episode of Care Goals: Satisfactory progress - ACTION (Actively working towards change); Intervened by  "reinforcing change plan / affirming steps taken     Current / Ongoing Stressors and Concerns:   Pt is currently seeking therapy due to ongoing anxiety/depression and to better manage his temper. Since last session, pt reported that things have been going better. Pt is still anxious about the new job with the 's department, and getting the final paperwork resolved. Pt did do his second interview for the supervisor position to keep his options open. Unfortunately, pt had another experience with a current supervisor where they tried to \"throw him under the bus\" when they chose not to follow policy. Thankfully another co-worker was able to back up what the pt reported on the situation and the concern was redirected to the supervisor who did not follow policy. Due to this recent experience pt is very apprehensive to stay there as he is coming to see that leadership is not being effective. Pt is feeling like he has come to terms with where he is at in his job and is now focused on just doing his job without worrying about others doing theirs. Pt is now trying to focus on himself and has started to go back to the gym. Pt has discussed how he has started to feel more motivated to work on himself and is coming to see that he has a lot of positive qualities, even if he isn't always where he would ideally want to be. Pt ended up going out for [facility] with a woman he has connect with on hinge, and it went really well. Pt felt they connected and that it was full of good vibes as they focused on just talking. Pt has continued to abstain from intimacy and focus more on making genuine connection with women he meets,  this has felt like a positive shift. Pt verbalized recognizing how his past behavior patterns were not in line with his internal values and was a coping skill to manage his own emotional needs, while not leaving himself feeling vulnerable. Pt discussed how he has also been doing well with reducing his drinking " and finds that is going much better as well. Pt is planning to go out tonight with friends, focusing on not engaging in unhelpful behaviors and having a good time that doesn't require him to drink excessively. Pt reflected on how he is starting to set small goals for himself and determining how he can express himself more in alternative ways, like art. Pt connected an experience he had growing up with art in his aunt's home which he find inspiring and wants to get the same artwork to put in his own place. Pt reflected that part of this is allowing himself to appreciate his heritage and embrace/utilize that more in a productive kind of way. Session went longer then anticipated due to presenting concerns needing extra time to process through.     Personal Goals:  Good Credit   license  House  Be with a partner that is invested in pt and is financially independent     Treatment Objective(s) Addressed in This Session:   identify and compliment positives at least 3 times daily to support positive self-image  identify triggers, thoughts, and current stressors which contribute to feelings of anxiety  identify patterns of escalation  (i.e. tightness in chest, flushed face, increased heart rate, clenched hands, etc.)  identify at least 3 techniques for intervening on the escalation  use cognitive strategies identified in therapy to challenge anxious thoughts  Decrease frequency and intensity of feeling down, depressed, hopeless  Identify negative self-talk and behaviors: challenge core beliefs, myths, and actions  identify two areas of life that you would like to have improved functioning  identify at least 3 self-care activities     Intervention:   CBT: Reviewed recently behavior patterns and environmental factors that impact them    Motivational Interviewing    MI Intervention: Expressed Empathy/Understanding, Supported Autonomy, Collaboration, Evocation, Open-ended questions, Reflections: simple and  complex, Change talk (evoked), and Reframe     Change Talk Expressed by the Patient: Desire to change Ability to change Reasons to change Need to change Committment to change Activation    Provider Response to Change Talk: E - Evoked more info from patient about behavior change, A - Affirmed patient's thoughts, decisions, or attempts at behavior change, R - Reflected patient's change talk, and S - Summarized patient's change talk statements    Psychodynamic: Processed through internal-experiences related to interpersonal relationships  Solution Focused: Identified self-imposed barriers and how he has worked to overcome them    Assessments completed prior to visit:  PHQ2:       12/19/2023     9:58 AM 9/26/2023     9:48 AM 9/19/2023     9:30 AM 6/19/2023     4:01 PM 6/13/2023     8:13 AM 3/7/2023    11:25 AM 3/7/2023    11:24 AM   PHQ-2 ( 1999 [employer])   Q1: Little interest or pleasure in doing things 1 1 1 0 1 1 1   Q2: Feeling down, depressed or hopeless 1 1 1 0 0 0 0   PHQ-2 Score 2 2 2 0 1 1 1   Q1: Little interest or pleasure in doing things Several days Several days Several days Not at all Several days Several days Several days   Q2: Feeling down, depressed or hopeless Several days Several days Several days Not at all Not at all Not at all Not at all   PHQ-2 Score 2 2 2 0 1 1 1     PHQ9:       4/12/2023     4:37 PM 1/11/2024     4:02 PM   PHQ-9 SCORE   PHQ-9 Total Score MyChart 5 (Mild depression) 9 (Mild depression)   PHQ-9 Total Score 5 9     GAD2:       4/12/2023     4:53 PM 7/18/2023     8:25 AM 10/17/2023     9:53 AM 10/30/2023    12:07 PM 12/19/2023     9:58 AM   BISHOP-2   Feeling nervous, anxious, or on edge 1 0 1 1 1   Not being able to stop or control worrying 1 0 1 1 1   BISHOP-2 Total Score 2 0    0 2    2 2 2        ASSESSMENT: Current Emotional / Mental Status (status of significant symptoms):   Risk status (Self / Other harm or suicidal ideation)   Patient denies current fears or concerns for personal  "safety.   Patient denies current or recent suicidal ideation or behaviors.   Patient denies current or recent homicidal ideation or behaviors.   Patient denies current or recent self injurious behavior or ideation.   Patient denies other safety concerns.   Patient reports there has been no change in risk factors since their last session.     Patient reports there has been no change in protective factors since their last session.     Recommended that patient call 911 or go to the local ED should there be a change in any of these risk factors.     Appearance:   Appropriate    Eye Contact:   Good    Psychomotor Behavior: Normal    Attitude:   Cooperative  Interested Friendly Pleasant   Orientation:   All   Speech    Rate / Production: Normal/ Responsive Talkative    Volume:  Normal    Mood:    Anxious  Normal   Affect:    Appropriate  Worrisome    Thought Content:  Clear    Thought Form:  Coherent  Logical    Insight:    Good  and Intellectual Insight     Medication Review:   No current psychiatric medications prescribed     Medication Compliance:   NA     Changes in Health Issues:   None reported     Chemical Use Review:   Substance Use: Chemical use reviewed, no active concerns identified      Tobacco Use: No change in amount of tobacco use since last session.  Provided encouragement to quit   Patient declined discussion at this time    Diagnosis:  1. Adjustment disorder with depressed mood      Collateral Reports Completed:   Not Applicable    PLAN: (Patient Tasks / Therapist Tasks / Other)  Need vs want list in a partner  Write a letter to your grand-father (Sebastian)  Go out with friends and focus on not drinking and not picking up women  Find that one person in the room that \"likes you\".    Judd Clemons [facility]                                                         ______________________________________________________________________    Individual Treatment Plan    Patient's Name: Sofi Gatica Gregg  Date Of " Birth: 1995    Date of Creation: 4/26/23  Date Treatment Plan Last Reviewed/Revised: 2/16/24    DSM5 Diagnoses: Adjustment Disorders  309.0 (F43.21) With depressed mood  Psychosocial / Contextual Factors: Pt recently moved to [place] from [place] and has been dealing with the culture shift. Pt has also been working on trying to get himself established here which has been challenging.  PROMIS (reviewed every 90 days):   PROMIS-10 Scores        2/16/2024     2:00 PM 3/1/2024     9:19 AM 3/15/2024     1:01 PM   PROMIS-10 Total Score w/o Sub Scores   PROMIS TOTAL - SUBSCORES 26    26 29    29 28    28       Referral / Collaboration:  Referral to another professional/service is not indicated at this time..    Anticipated number of session for this episode of care: 9-12 sessions  Anticipation frequency of session: Weekly  Anticipated Duration of each session: 38-52 minutes  Treatment plan will be reviewed in 90 days or when goals have been changed.       MeasurableTreatment Goal(s) related to diagnosis / functional impairment(s)  Goal 1: Patient will better manage his anger/frustration, not getting as easily aggravated, and not letting comments get to him as easily.    I will know I've met my goal when I am able to not let others get to me so much.      Objective #A (Patient Action)    Patient will identify triggers, thoughts, and current stressors which contribute to feelings of anxiety  identify patterns of escalation  (i.e. tightness in chest, flushed face, increased heart rate, clenched hands, etc.)  identify at least 3 techniques for intervening on the escalation  use cognitive strategies identified in therapy to challenge anxious thoughts  Increase interest, engagement, and pleasure in doing things  Decrease frequency and intensity of feeling down, depressed, hopeless  Identify negative self-talk and behaviors: challenge core beliefs, myths, and actions  Improve concentration, focus, and mindfulness in daily activities    identify 3 coping strategies for improving mood  learn at least 3 self-regulation strategies.  Status: Continued - Date(s): 2/16/24    Intervention(s)  Therapist will assign homework related to target objective with the intent to promote pt autonomy and better manage MH.  Facilitate increase in self-awareness  Provide psycho-education on emotion identification/regulation and coping skills  Teach/promote utilization of mindfulness skills and grounding    Goal 2: Patient will feel worthy and be able to find validation internally.     I will know I've met my goal when able to feel more complete or worthy without needing it externally.      Objective #A (Patient Action)    Status: Continued - Date(s): 2/16/24    Patient will identify and compliment positives at least 3 times daily to support positive self-image  Decrease frequency and intensity of feeling down, depressed, hopeless  Identify negative self-talk and behaviors: challenge core beliefs, myths, and actions  identify 5 traits or charcteristics you like about yourself  identify at least 3 self-care activities.    Intervention(s)  Therapist will assign homework related to target objective with the intent to promote pt autonomy and better manage MH.  Facilitate increase in self-awareness  Provide psycho-education on self-care/compassion and narrative therapy interventions  Teach/promote utilization of reframing and boundary setting    Objective #B Being more authentic with myself and others through improving my self-esteem.   Patient will participate in social activities to improve mood  learn & utilize at least 3 assertive communication skills weekly  identify 5 traits or charcteristics you like about yourself  identify at least 3 adaptive coping statements to counteract negative thoughts.    Status: Continued - Date(s): 2/16/24    Intervention(s)  Therapist will assign homework related to target objective with the intent to promote pt autonomy and better  manage MH.  Facilitate increase in self-awareness  Provide psycho-education on self-esteem building and self-exploration  Teach/promote utilization of positive self-affirmations and critical observation skills    Patient has reviewed and agreed to the above plan.      Judd Clemons, LPCC  April 26, 2023

## 2024-03-21 ENCOUNTER — LAB (OUTPATIENT)
Dept: LAB | Facility: CLINIC | Age: 29
End: 2024-03-21
Payer: COMMERCIAL

## 2024-03-21 DIAGNOSIS — Z11.3 ROUTINE SCREENING FOR STI (SEXUALLY TRANSMITTED INFECTION): ICD-10-CM

## 2024-03-21 PROCEDURE — 87491 CHLMYD TRACH DNA AMP PROBE: CPT

## 2024-03-21 PROCEDURE — 87389 HIV-1 AG W/HIV-1&-2 AB AG IA: CPT

## 2024-03-21 PROCEDURE — 36415 COLL VENOUS BLD VENIPUNCTURE: CPT

## 2024-03-21 PROCEDURE — 86780 TREPONEMA PALLIDUM: CPT

## 2024-03-21 PROCEDURE — 87591 N.GONORRHOEAE DNA AMP PROB: CPT

## 2024-03-22 ENCOUNTER — VIRTUAL VISIT (OUTPATIENT)
Dept: PSYCHOLOGY | Facility: CLINIC | Age: 29
End: 2024-03-22
Payer: COMMERCIAL

## 2024-03-22 DIAGNOSIS — F43.21 ADJUSTMENT DISORDER WITH DEPRESSED MOOD: Primary | ICD-10-CM

## 2024-03-22 PROCEDURE — 90837 PSYTX W PT 60 MINUTES: CPT | Mod: 95 | Performed by: COUNSELOR

## 2024-03-22 NOTE — PROGRESS NOTES
[facility]                                     Progress Note    Patient Name: Sofi Escobedo  Date: 3/22/24         Service Type: Individual      Session Start Time: 1:02 pm  Session End Time: 1:59 pm     Session Length: 57 minutes    Session #: 32    Attendees: Client attended alone    Service Modality:  Video Visit:      Provider verified identity through the following two step process.  Patient provided:  Patient is known previously to provider    Telemedicine Visit: The patient's condition can be safely assessed and treated via synchronous audio and visual telemedicine encounter.      Reason for Telemedicine Visit: Services only offered telehealth    Originating Site (Patient Location): Patient's home    Distant Site (Provider Location): Provider Remote Setting- Home Office    Consent:  The patient/guardian has verbally consented to: the potential risks and benefits of telemedicine (video visit) versus in person care; bill my insurance or make self-payment for services provided; and responsibility for payment of non-covered services.     Patient would like the video invitation sent by:  My Chart    Mode of Communication:  Video Conference via [employer]    Distant Location (Provider):  Off-site    As the provider I attest to compliance with applicable laws and regulations related to telemedicine.    DATA  Interactive Complexity: No  Crisis: No  Extended Session (53+ minutes): PROLONGED SERVICE IN THE OUTPATIENT SETTING REQUIRING DIRECT (FACE-TO-FACE) PATIENT CONTACT BEYOND THE USUAL SERVICE:    - Patient's presenting concerns require more intensive intervention than could be completed within the usual service     Progress Since Last Session (Related to Symptoms / Goals / Homework):   Symptoms: Improving pt is feeling more confident and less down    Homework: Partially completed      Episode of Care Goals: Satisfactory progress - ACTION (Actively working towards change); Intervened by  reinforcing change plan / affirming steps taken     Current / Ongoing Stressors and Concerns:   Pt is currently seeking therapy due to ongoing anxiety/depression and to better manage his temper. Since last session, pt reported that he has been doing fine in his personal life and yet work has been progressively more challenging. Pt verbalized that while things were getting better, this past week things have returned to being more challenging. Pt discussed how he continues to have experiences with his leadership that make him feel undermined and aren't taking into consideration staff safety. Pt is really struggling to come to  of how he can make it till his end date, as he feels that the work environment is becoming very stressful. Pt has continued to to connect with the new girl he met over [employer] and that has continued to go really well, with them focusing on conversation. Pt has been trying to focus more on communication when it comes to his friends, which has been helpful as well. Pt has been navigating frustration with his dad around being asked to get lottery tickets again, though he has been trying to deflect those requests as he doesn't want to engage with them. Pt expressed that when this comes up he gets frustrated as it bring up feelings of being treated as a means to an end, instead of family, which he doesn't like. Pt was able to get himself to meal prep, which felt accomplishing, and helped him feel connected to his old passion for cooking. Pt has been successful in getting himself back into his old routine of working out and that has been feeling better as well. Pt's overall drinking continue to improve and he has been successful in using moderation when he does drink. Session went longer then anticipated due to presenting concerns needing extra time to process through.     Personal Goals:  Good Credit   license  House  Be with a partner that is invested in pt and is financially  independent     Treatment Objective(s) Addressed in This Session:   identify and compliment positives at least 3 times daily to support positive self-image  identify triggers, thoughts, and current stressors which contribute to feelings of anxiety  identify patterns of escalation  (i.e. tightness in chest, flushed face, increased heart rate, clenched hands, etc.)  identify at least 3 techniques for intervening on the escalation  use cognitive strategies identified in therapy to challenge anxious thoughts  Decrease frequency and intensity of feeling down, depressed, hopeless  Identify negative self-talk and behaviors: challenge core beliefs, myths, and actions  identify two areas of life that you would like to have improved functioning  identify at least 3 self-care activities     Intervention:   CBT: Reviewed recently behavior patterns and environmental factors that impact them    Motivational Interviewing    MI Intervention: Expressed Empathy/Understanding, Supported Autonomy, Collaboration, Evocation, Open-ended questions, Reflections: simple and complex, Change talk (evoked), and Reframe     Change Talk Expressed by the Patient: Desire to change Ability to change Reasons to change Need to change Committment to change Activation    Provider Response to Change Talk: E - Evoked more info from patient about behavior change, A - Affirmed patient's thoughts, decisions, or attempts at behavior change, R - Reflected patient's change talk, and S - Summarized patient's change talk statements    Psychodynamic: Processed through internal-experiences related to interpersonal relationships  Solution Focused: Identified self-imposed barriers and how he has worked to overcome them    Assessments completed prior to visit:  PHQ2:       3/22/2024     1:01 PM 12/19/2023     9:58 AM 9/26/2023     9:48 AM 9/19/2023     9:30 AM 6/19/2023     4:01 PM 6/13/2023     8:13 AM 3/7/2023    11:25 AM   PHQ-2 ( 1999 [employer])   Q1: Little interest  or pleasure in doing things 1 1 1 1 0 1 1   Q2: Feeling down, depressed or hopeless 1 1 1 1 0 0 0   PHQ-2 Score 2 2 2 2 0 1 1   Q1: Little interest or pleasure in doing things Several days Several days Several days Several days Not at all Several days Several days   Q2: Feeling down, depressed or hopeless Several days Several days Several days Several days Not at all Not at all Not at all   PHQ-2 Score 2 2 2 2 0 1 1     PHQ9:       4/12/2023     4:37 PM 1/11/2024     4:02 PM   PHQ-9 SCORE   PHQ-9 Total Score MyChart 5 (Mild depression) 9 (Mild depression)   PHQ-9 Total Score 5 9     GAD2:       4/12/2023     4:53 PM 7/18/2023     8:25 AM 10/17/2023     9:53 AM 10/30/2023    12:07 PM 12/19/2023     9:58 AM 3/20/2024    10:41 PM   BISHOP-2   Feeling nervous, anxious, or on edge 1 0 1 1 1 1   Not being able to stop or control worrying 1 0 1 1 1 1   BISHOP-2 Total Score 2 0    0 2    2 2 2 2        ASSESSMENT: Current Emotional / Mental Status (status of significant symptoms):   Risk status (Self / Other harm or suicidal ideation)   Patient denies current fears or concerns for personal safety.   Patient denies current or recent suicidal ideation or behaviors.   Patient denies current or recent homicidal ideation or behaviors.   Patient denies current or recent self injurious behavior or ideation.   Patient denies other safety concerns.   Patient reports there has been no change in risk factors since their last session.     Patient reports there has been no change in protective factors since their last session.     Recommended that patient call 911 or go to the local ED should there be a change in any of these risk factors.     Appearance:   Appropriate    Eye Contact:   Good    Psychomotor Behavior: Normal    Attitude:   Cooperative  Interested Friendly Pleasant   Orientation:   All   Speech    Rate / Production: Normal/ Responsive Talkative    Volume:  Normal    Mood:    Anxious  Normal   Affect:    Appropriate  Worrisome     Thought Content:  Clear    Thought Form:  Coherent  Logical    Insight:    Good  and Intellectual Insight     Medication Review:   No current psychiatric medications prescribed     Medication Compliance:   NA     Changes in Health Issues:   None reported     Chemical Use Review:   Substance Use: Chemical use reviewed, no active concerns identified      Tobacco Use: No change in amount of tobacco use since last session.  Provided encouragement to quit   Patient declined discussion at this time    Diagnosis:  1. Adjustment disorder with depressed mood        Collateral Reports Completed:   Not Applicable    PLAN: (Patient Tasks / Therapist Tasks / Other)  Need vs want list in a partner  Write a letter to your grand-father (Sebastian)  Continue to engage in regular meal prep and working out  Focus on communication in relationships    LADONNA Paez                                                         ______________________________________________________________________    Individual Treatment Plan    Patient's Name: Sofi Escobedo  YOB: 1995    Date of Creation: 4/26/23  Date Treatment Plan Last Reviewed/Revised: 2/16/24    DSM5 Diagnoses: Adjustment Disorders  309.0 (F43.21) With depressed mood  Psychosocial / Contextual Factors: Pt recently moved to [place] from [place] and has been dealing with the culture shift. Pt has also been working on trying to get himself established here which has been challenging.  PROMIS (reviewed every 90 days):   PROMIS-10 Scores        2/16/2024     2:00 PM 3/1/2024     9:19 AM 3/15/2024     1:01 PM   PROMIS-10 Total Score w/o Sub Scores   PROMIS TOTAL - SUBSCORES 26    26 29    29 28    28       Referral / Collaboration:  Referral to another professional/service is not indicated at this time..    Anticipated number of session for this episode of care: 9-12 sessions  Anticipation frequency of session: Weekly  Anticipated Duration of each session: 38-52  minutes  Treatment plan will be reviewed in 90 days or when goals have been changed.       MeasurableTreatment Goal(s) related to diagnosis / functional impairment(s)  Goal 1: Patient will better manage his anger/frustration, not getting as easily aggravated, and not letting comments get to him as easily.    I will know I've met my goal when I am able to not let others get to me so much.      Objective #A (Patient Action)    Patient will identify triggers, thoughts, and current stressors which contribute to feelings of anxiety  identify patterns of escalation  (i.e. tightness in chest, flushed face, increased heart rate, clenched hands, etc.)  identify at least 3 techniques for intervening on the escalation  use cognitive strategies identified in therapy to challenge anxious thoughts  Increase interest, engagement, and pleasure in doing things  Decrease frequency and intensity of feeling down, depressed, hopeless  Identify negative self-talk and behaviors: challenge core beliefs, myths, and actions  Improve concentration, focus, and mindfulness in daily activities   identify 3 coping strategies for improving mood  learn at least 3 self-regulation strategies.  Status: Continued - Date(s): 2/16/24    Intervention(s)  Therapist will assign homework related to target objective with the intent to promote pt autonomy and better manage MH.  Facilitate increase in self-awareness  Provide psycho-education on emotion identification/regulation and coping skills  Teach/promote utilization of mindfulness skills and grounding    Goal 2: Patient will feel worthy and be able to find validation internally.     I will know I've met my goal when able to feel more complete or worthy without needing it externally.      Objective #A (Patient Action)    Status: Continued - Date(s): 2/16/24    Patient will identify and compliment positives at least 3 times daily to support positive self-image  Decrease frequency and intensity of feeling  down, depressed, hopeless  Identify negative self-talk and behaviors: challenge core beliefs, myths, and actions  identify 5 traits or charcteristics you like about yourself  identify at least 3 self-care activities.    Intervention(s)  Therapist will assign homework related to target objective with the intent to promote pt autonomy and better manage MH.  Facilitate increase in self-awareness  Provide psycho-education on self-care/compassion and narrative therapy interventions  Teach/promote utilization of reframing and boundary setting    Objective #B Being more authentic with myself and others through improving my self-esteem.   Patient will participate in social activities to improve mood  learn & utilize at least 3 assertive communication skills weekly  identify 5 traits or charcteristics you like about yourself  identify at least 3 adaptive coping statements to counteract negative thoughts.    Status: Continued - Date(s): 2/16/24    Intervention(s)  Therapist will assign homework related to target objective with the intent to promote pt autonomy and better manage MH.  Facilitate increase in self-awareness  Provide psycho-education on self-esteem building and self-exploration  Teach/promote utilization of positive self-affirmations and critical observation skills    Patient has reviewed and agreed to the above plan.      Judd Clemons, GILBERTC  April 26, 2023

## 2024-03-29 ENCOUNTER — VIRTUAL VISIT (OUTPATIENT)
Dept: PSYCHOLOGY | Facility: CLINIC | Age: 29
End: 2024-03-29
Payer: COMMERCIAL

## 2024-03-29 DIAGNOSIS — F43.21 ADJUSTMENT DISORDER WITH DEPRESSED MOOD: Primary | ICD-10-CM

## 2024-03-29 PROCEDURE — 90837 PSYTX W PT 60 MINUTES: CPT | Mod: 95 | Performed by: COUNSELOR

## 2024-03-29 NOTE — PROGRESS NOTES
[facility]                                     Progress Note    Patient Name: Sofi Escobedo  Date: 3/29/24         Service Type: Individual      Session Start Time: 1:02 pm  Session End Time: 1:58 pm     Session Length: 56 minutes    Session #: 33    Attendees: Client attended alone    Service Modality:  Video Visit:      Provider verified identity through the following two step process.  Patient provided:  Patient is known previously to provider    Telemedicine Visit: The patient's condition can be safely assessed and treated via synchronous audio and visual telemedicine encounter.      Reason for Telemedicine Visit: Services only offered telehealth    Originating Site (Patient Location): Patient's home    Distant Site (Provider Location): Provider Remote Setting- Home Office    Consent:  The patient/guardian has verbally consented to: the potential risks and benefits of telemedicine (video visit) versus in person care; bill my insurance or make self-payment for services provided; and responsibility for payment of non-covered services.     Patient would like the video invitation sent by:  My Chart    Mode of Communication:  Video Conference via [employer]    Distant Location (Provider):  Off-site    As the provider I attest to compliance with applicable laws and regulations related to telemedicine.    DATA  Interactive Complexity: No  Crisis: No  Extended Session (53+ minutes): PROLONGED SERVICE IN THE OUTPATIENT SETTING REQUIRING DIRECT (FACE-TO-FACE) PATIENT CONTACT BEYOND THE USUAL SERVICE:    - Patient's presenting concerns require more intensive intervention than could be completed within the usual service     Progress Since Last Session (Related to Symptoms / Goals / Homework):   Symptoms: Improving pt is feeling more confident and less down    Homework: Partially completed      Episode of Care Goals: Satisfactory progress - ACTION (Actively working towards change); Intervened by  reinforcing change plan / affirming steps taken     Current / Ongoing Stressors and Concerns:   Pt is currently seeking therapy due to ongoing anxiety/depression and to better manage his temper. Since last session, pt felt that things have continued to go really well with the new girl he has been connecting with. Pt has found that they have great conversations and he feels comfortable being himself around her. Pt expressed that this feels different and has been able to be transparent with her in a way he hasn't been able to with other women. Pt recounted the conversations they have had and how that has been a really positive experience, as it has shown they have a lot in common. There is a level of trust that he feels he's fostering that he hasn't had before in a relationship. Pt processed though how work has been going recently. Pt reflected feeling like he is being set up for failure and yet is focusing on doing his duty. Pt is thinking that he is outgrowing the role and that the direction the company is going doesn't align with his values. Pt expressed that he has been thinking about himself recently and realized that he is proud of himself, which he has been trying to express/acknowledge to himself more. Pt verbalized that music has been really helpful in supporting him in realizing the mistakes he has made are a source of growth for himself and while he may feel down at times about them, they are parts of his story that make him who he is. Session went longer then anticipated due to presenting concerns needing extra time to process through.     Personal Goals:  Good Credit   license  House  Be with a partner that is invested in pt and is financially independent     Treatment Objective(s) Addressed in This Session:   identify and compliment positives at least 3 times daily to support positive self-image  identify triggers, thoughts, and current stressors which contribute to feelings of  anxiety  identify patterns of escalation  (i.e. tightness in chest, flushed face, increased heart rate, clenched hands, etc.)  identify at least 3 techniques for intervening on the escalation  use cognitive strategies identified in therapy to challenge anxious thoughts  Decrease frequency and intensity of feeling down, depressed, hopeless  Identify negative self-talk and behaviors: challenge core beliefs, myths, and actions  identify two areas of life that you would like to have improved functioning  identify at least 3 self-care activities     Intervention:   CBT: Reviewed recently behavior patterns and environmental factors that impact them    Motivational Interviewing    MI Intervention: Expressed Empathy/Understanding, Supported Autonomy, Collaboration, Evocation, Open-ended questions, Reflections: simple and complex, Change talk (evoked), and Reframe     Change Talk Expressed by the Patient: Desire to change Ability to change Reasons to change Need to change Committment to change Activation    Provider Response to Change Talk: E - Evoked more info from patient about behavior change, A - Affirmed patient's thoughts, decisions, or attempts at behavior change, R - Reflected patient's change talk, and S - Summarized patient's change talk statements    Psychodynamic: Processed through internal-experiences related to interpersonal relationships  Solution Focused: Identified self-imposed barriers and how he has worked to overcome them    Assessments completed prior to visit:  PHQ2:       3/29/2024     9:30 AM 3/22/2024     1:01 PM 12/19/2023     9:58 AM 9/26/2023     9:48 AM 9/19/2023     9:30 AM 6/19/2023     4:01 PM 6/13/2023     8:13 AM   PHQ-2 ( 1999 [employer])   Q1: Little interest or pleasure in doing things 1 1 1 1 1 0 1   Q2: Feeling down, depressed or hopeless 1 1 1 1 1 0 0   PHQ-2 Score 2 2 2 2 2 0 1   Q1: Little interest or pleasure in doing things Several days Several days Several days Several days Several  days Not at all Several days   Q2: Feeling down, depressed or hopeless Several days Several days Several days Several days Several days Not at all Not at all   PHQ-2 Score 2 2 2 2 2 0 1     PHQ9:       4/12/2023     4:37 PM 1/11/2024     4:02 PM   PHQ-9 SCORE   PHQ-9 Total Score MyChart 5 (Mild depression) 9 (Mild depression)   PHQ-9 Total Score 5 9     GAD2:       4/12/2023     4:53 PM 7/18/2023     8:25 AM 10/17/2023     9:53 AM 10/30/2023    12:07 PM 12/19/2023     9:58 AM 3/20/2024    10:41 PM 3/29/2024     9:30 AM   BISHOP-2   Feeling nervous, anxious, or on edge 1 0 1 1 1 1 1   Not being able to stop or control worrying 1 0 1 1 1 1 1   BISHOP-2 Total Score 2 0    0 2    2 2 2 2 2        ASSESSMENT: Current Emotional / Mental Status (status of significant symptoms):   Risk status (Self / Other harm or suicidal ideation)   Patient denies current fears or concerns for personal safety.   Patient denies current or recent suicidal ideation or behaviors.   Patient denies current or recent homicidal ideation or behaviors.   Patient denies current or recent self injurious behavior or ideation.   Patient denies other safety concerns.   Patient reports there has been no change in risk factors since their last session.     Patient reports there has been no change in protective factors since their last session.     Recommended that patient call 911 or go to the local ED should there be a change in any of these risk factors.     Appearance:   Appropriate    Eye Contact:   Good    Psychomotor Behavior: Normal    Attitude:   Cooperative  Interested Friendly Pleasant   Orientation:   All   Speech    Rate / Production: Normal/ Responsive Talkative    Volume:  Normal    Mood:    Anxious  Normal   Affect:    Appropriate    Thought Content:  Clear    Thought Form:  Coherent  Logical    Insight:    Good  and Intellectual Insight     Medication Review:   No current psychiatric medications prescribed     Medication  Compliance:   NA     Changes in Health Issues:   None reported     Chemical Use Review:   Substance Use: Chemical use reviewed, no active concerns identified      Tobacco Use: No change in amount of tobacco use since last session.  Provided encouragement to quit   Patient declined discussion at this time    Diagnosis:  1. Adjustment disorder with depressed mood      Collateral Reports Completed:   Not Applicable    PLAN: (Patient Tasks / Therapist Tasks / Other)  Write a letter to your grand-father (Sebastian)  Continue to engage in regular meal prep and working out routine  Continue to focus on communication in relationships  Keep using positive self-affirmations    LADONNA Paez                                                         ______________________________________________________________________    Individual Treatment Plan    Patient's Name: Sofi Escobedo  YOB: 1995    Date of Creation: 4/26/23  Date Treatment Plan Last Reviewed/Revised: 2/16/24    DSM5 Diagnoses: Adjustment Disorders  309.0 (F43.21) With depressed mood  Psychosocial / Contextual Factors: Pt recently moved to [place] from [place] and has been dealing with the culture shift. Pt has also been working on trying to get himself established here which has been challenging.  PROMIS (reviewed every 90 days):   PROMIS-10 Scores        3/1/2024     9:19 AM 3/15/2024     1:01 PM 3/29/2024     9:31 AM   PROMIS-10 Total Score w/o Sub Scores   PROMIS TOTAL - SUBSCORES 29    29 28    28 34       Referral / Collaboration:  Referral to another professional/service is not indicated at this time..    Anticipated number of session for this episode of care: 9-12 sessions  Anticipation frequency of session: Weekly  Anticipated Duration of each session: 38-52 minutes  Treatment plan will be reviewed in 90 days or when goals have been changed.       MeasurableTreatment Goal(s) related to diagnosis / functional impairment(s)  Goal 1: Patient  will better manage his anger/frustration, not getting as easily aggravated, and not letting comments get to him as easily.    I will know I've met my goal when I am able to not let others get to me so much.      Objective #A (Patient Action)    Patient will identify triggers, thoughts, and current stressors which contribute to feelings of anxiety  identify patterns of escalation  (i.e. tightness in chest, flushed face, increased heart rate, clenched hands, etc.)  identify at least 3 techniques for intervening on the escalation  use cognitive strategies identified in therapy to challenge anxious thoughts  Increase interest, engagement, and pleasure in doing things  Decrease frequency and intensity of feeling down, depressed, hopeless  Identify negative self-talk and behaviors: challenge core beliefs, myths, and actions  Improve concentration, focus, and mindfulness in daily activities   identify 3 coping strategies for improving mood  learn at least 3 self-regulation strategies.  Status: Continued - Date(s): 2/16/24    Intervention(s)  Therapist will assign homework related to target objective with the intent to promote pt autonomy and better manage MH.  Facilitate increase in self-awareness  Provide psycho-education on emotion identification/regulation and coping skills  Teach/promote utilization of mindfulness skills and grounding    Goal 2: Patient will feel worthy and be able to find validation internally.     I will know I've met my goal when able to feel more complete or worthy without needing it externally.      Objective #A (Patient Action)    Status: Continued - Date(s): 2/16/24    Patient will identify and compliment positives at least 3 times daily to support positive self-image  Decrease frequency and intensity of feeling down, depressed, hopeless  Identify negative self-talk and behaviors: challenge core beliefs, myths, and actions  identify 5 traits or charcteristics you like about yourself  identify at  least 3 self-care activities.    Intervention(s)  Therapist will assign homework related to target objective with the intent to promote pt autonomy and better manage MH.  Facilitate increase in self-awareness  Provide psycho-education on self-care/compassion and narrative therapy interventions  Teach/promote utilization of reframing and boundary setting    Objective #B Being more authentic with myself and others through improving my self-esteem.   Patient will participate in social activities to improve mood  learn & utilize at least 3 assertive communication skills weekly  identify 5 traits or charcteristics you like about yourself  identify at least 3 adaptive coping statements to counteract negative thoughts.    Status: Continued - Date(s): 2/16/24    Intervention(s)  Therapist will assign homework related to target objective with the intent to promote pt autonomy and better manage MH.  Facilitate increase in self-awareness  Provide psycho-education on self-esteem building and self-exploration  Teach/promote utilization of positive self-affirmations and critical observation skills    Patient has reviewed and agreed to the above plan.      Judd Clemons, LADONNA  April 26, 2023

## 2024-04-12 ENCOUNTER — ANCILLARY PROCEDURE (OUTPATIENT)
Dept: GENERAL RADIOLOGY | Facility: CLINIC | Age: 29
End: 2024-04-12
Attending: PHYSICIAN ASSISTANT
Payer: COMMERCIAL

## 2024-04-12 ENCOUNTER — OFFICE VISIT (OUTPATIENT)
Dept: URGENT CARE | Facility: URGENT CARE | Age: 29
End: 2024-04-12
Payer: COMMERCIAL

## 2024-04-12 ENCOUNTER — VIRTUAL VISIT (OUTPATIENT)
Dept: PSYCHOLOGY | Facility: CLINIC | Age: 29
End: 2024-04-12
Payer: COMMERCIAL

## 2024-04-12 VITALS
DIASTOLIC BLOOD PRESSURE: 82 MMHG | TEMPERATURE: 99.2 F | WEIGHT: 254.6 LBS | BODY MASS INDEX: 38.01 KG/M2 | OXYGEN SATURATION: 96 % | HEART RATE: 86 BPM | SYSTOLIC BLOOD PRESSURE: 141 MMHG

## 2024-04-12 DIAGNOSIS — R50.9 FEVER, UNSPECIFIED FEVER CAUSE: ICD-10-CM

## 2024-04-12 DIAGNOSIS — B96.89 BACTERIAL TONSILLITIS: ICD-10-CM

## 2024-04-12 DIAGNOSIS — B96.89 BACTERIAL CONJUNCTIVITIS OF BOTH EYES: ICD-10-CM

## 2024-04-12 DIAGNOSIS — S89.91XA KNEE INJURY, RIGHT, INITIAL ENCOUNTER: ICD-10-CM

## 2024-04-12 DIAGNOSIS — H10.9 BACTERIAL CONJUNCTIVITIS OF BOTH EYES: ICD-10-CM

## 2024-04-12 DIAGNOSIS — J03.80 BACTERIAL TONSILLITIS: ICD-10-CM

## 2024-04-12 DIAGNOSIS — R07.0 THROAT PAIN: Primary | ICD-10-CM

## 2024-04-12 DIAGNOSIS — F43.21 ADJUSTMENT DISORDER WITH DEPRESSED MOOD: Primary | ICD-10-CM

## 2024-04-12 LAB
DEPRECATED S PYO AG THROAT QL EIA: NEGATIVE
FLUAV AG SPEC QL IA: NEGATIVE
FLUBV AG SPEC QL IA: NEGATIVE
GROUP A STREP BY PCR: NOT DETECTED

## 2024-04-12 PROCEDURE — 90832 PSYTX W PT 30 MINUTES: CPT | Mod: GT | Performed by: COUNSELOR

## 2024-04-12 PROCEDURE — 73562 X-RAY EXAM OF KNEE 3: CPT | Mod: TC | Performed by: RADIOLOGY

## 2024-04-12 PROCEDURE — 87804 INFLUENZA ASSAY W/OPTIC: CPT | Performed by: PHYSICIAN ASSISTANT

## 2024-04-12 PROCEDURE — 87651 STREP A DNA AMP PROBE: CPT | Performed by: PHYSICIAN ASSISTANT

## 2024-04-12 PROCEDURE — 99214 OFFICE O/P EST MOD 30 MIN: CPT | Performed by: PHYSICIAN ASSISTANT

## 2024-04-12 RX ORDER — AMOXICILLIN 875 MG
875 TABLET ORAL 2 TIMES DAILY
Qty: 20 TABLET | Refills: 0 | Status: SHIPPED | OUTPATIENT
Start: 2024-04-12 | End: 2024-04-22

## 2024-04-12 RX ORDER — TOBRAMYCIN 3 MG/ML
1-2 SOLUTION/ DROPS OPHTHALMIC EVERY 4 HOURS
Qty: 5 ML | Refills: 0 | Status: SHIPPED | OUTPATIENT
Start: 2024-04-12 | End: 2024-04-19

## 2024-04-12 RX ORDER — IBUPROFEN 800 MG/1
800 TABLET, FILM COATED ORAL EVERY 8 HOURS PRN
Qty: 30 TABLET | Refills: 0 | Status: SHIPPED | OUTPATIENT
Start: 2024-04-12

## 2024-04-12 NOTE — PROGRESS NOTES
[facility]                                     Progress Note    Patient Name: Sofi Escobedo  Date: 4/12/24         Service Type: Individual      Session Start Time: 1:01 pm  Session End Time: 1:35 pm     Session Length: 34 minutes    Session #: 34    Attendees: Client attended alone    Service Modality:  Video Visit:      Provider verified identity through the following two step process.  Patient provided:  Patient is known previously to provider    Telemedicine Visit: The patient's condition can be safely assessed and treated via synchronous audio and visual telemedicine encounter.      Reason for Telemedicine Visit: Services only offered telehealth    Originating Site (Patient Location): Patient's home    Distant Site (Provider Location): Provider Remote Setting- Home Office    Consent:  The patient/guardian has verbally consented to: the potential risks and benefits of telemedicine (video visit) versus in person care; bill my insurance or make self-payment for services provided; and responsibility for payment of non-covered services.     Patient would like the video invitation sent by:  My Chart    Mode of Communication:  Video Conference via Amwell    Distant Location (Provider):  Off-site    As the provider I attest to compliance with applicable laws and regulations related to telemedicine.    DATA  Interactive Complexity: No  Crisis: No  Extended Session (53+ minutes): No     Progress Since Last Session (Related to Symptoms / Goals / Homework):   Symptoms: Worsening pt has felt low energy and lower mood recently    Homework: Partially completed      Episode of Care Goals: Satisfactory progress - PREPARATION (Decided to change - considering how); Intervened by negotiating a change plan and determining options / strategies for behavior change, identifying triggers, exploring social supports, and working towards setting a date to begin behavior change     Current / Ongoing  Stressors and Concerns:   Pt is currently seeking therapy due to ongoing anxiety/depression and to better manage his temper. Since last session, pt officially put in his two week notice and he starts his new job shortly after that. Pt has been feeling ill with an upper respiratory cold and pink eye in both eyes, which has been really dragging him down a bit. Pt's nephew got into a bad car accident where he needed to be placed in medically induced coma, though he has started to get a little better. Pt processed through how work has been going and felt that these past couple weeks have reaffirmed to him that he needs to leave. Pt reflected that this last week it has been especially bad with back to back codes and safety concerns. Pt expressed feeling some relief now that he is leaving given how things have been going and thinks it will be a big positive change in his life. Pt identified a big part of what has been triggering him recently has been staff over-sharing info with the residents, like pt is leaving, and this can place him in a bad situation. Pt expressed some anxiety around his finances because he hasn't been able to work overtime, so he has decided to not go visit family in [facility] next month recognizing it would be a poor financial choice. Overall pt thinks he is keeping it together and recognizes that there is a lot going on right now, which has made it feel overwhelming at times since feeling sick.     Personal Goals:  Good Credit   license  House  Be with a partner that is invested in pt and is financially independent     Treatment Objective(s) Addressed in This Session:   identify and compliment positives at least 3 times daily to support positive self-image  identify triggers, thoughts, and current stressors which contribute to feelings of anxiety  identify patterns of escalation  (i.e. tightness in chest, flushed face, increased heart rate, clenched hands, etc.)  identify at least 3  techniques for intervening on the escalation  use cognitive strategies identified in therapy to challenge anxious thoughts  Decrease frequency and intensity of feeling down, depressed, hopeless  Identify negative self-talk and behaviors: challenge core beliefs, myths, and actions  identify two areas of life that you would like to have improved functioning  identify at least 3 self-care activities     Intervention:   CBT: Reviewed recently behavior patterns and environmental factors that impact them    Motivational Interviewing    MI Intervention: Expressed Empathy/Understanding, Supported Autonomy, Collaboration, Evocation, Open-ended questions, Reflections: simple and complex, Change talk (evoked), and Reframe     Change Talk Expressed by the Patient: Desire to change Ability to change Reasons to change Need to change Committment to change Activation    Provider Response to Change Talk: E - Evoked more info from patient about behavior change, A - Affirmed patient's thoughts, decisions, or attempts at behavior change, R - Reflected patient's change talk, and S - Summarized patient's change talk statements    Psychodynamic: Processed through internal-experiences related to interpersonal relationships  Solution Focused: Identified self-imposed barriers and how he has worked to overcome them    Assessments completed prior to visit:  PHQ2:       4/12/2024    12:27 PM 3/29/2024     9:30 AM 3/22/2024     1:01 PM 12/19/2023     9:58 AM 9/26/2023     9:48 AM 9/19/2023     9:30 AM 6/19/2023     4:01 PM   PHQ-2 ( 1999 [employer])   Q1: Little interest or pleasure in doing things 1 1 1 1 1 1 0   Q2: Feeling down, depressed or hopeless 1 1 1 1 1 1 0   PHQ-2 Score 2 2 2 2 2 2 0   Q1: Little interest or pleasure in doing things Several days Several days Several days Several days Several days Several days Not at all   Q2: Feeling down, depressed or hopeless Several days Several days Several days Several days Several days Several days  Not at all   PHQ-2 Score 2 2 2 2 2 2 0     PHQ9:       4/12/2023     4:37 PM 1/11/2024     4:02 PM   PHQ-9 SCORE   PHQ-9 Total Score MyChart 5 (Mild depression) 9 (Mild depression)   PHQ-9 Total Score 5 9     GAD2:       7/18/2023     8:25 AM 10/17/2023     9:53 AM 10/30/2023    12:07 PM 12/19/2023     9:58 AM 3/20/2024    10:41 PM 3/29/2024     9:30 AM 4/5/2024     9:51 AM   BISHOP-2   Feeling nervous, anxious, or on edge 0 1 1 1 1 1 1   Not being able to stop or control worrying 0 1 1 1 1 1 1   BISHOP-2 Total Score 0    0 2    2 2 2 2 2 2        ASSESSMENT: Current Emotional / Mental Status (status of significant symptoms):   Risk status (Self / Other harm or suicidal ideation)   Patient denies current fears or concerns for personal safety.   Patient denies current or recent suicidal ideation or behaviors.   Patient denies current or recent homicidal ideation or behaviors.   Patient denies current or recent self injurious behavior or ideation.   Patient denies other safety concerns.   Patient reports there has been no change in risk factors since their last session.     Patient reports there has been no change in protective factors since their last session.     Recommended that patient call 911 or go to the local ED should there be a change in any of these risk factors.     Appearance:   Appropriate    Eye Contact:   Good    Psychomotor Behavior: Normal    Attitude:   Cooperative  Interested Friendly Pleasant   Orientation:   All   Speech    Rate / Production: Normal/ Responsive    Volume:  Normal    Mood:    Anxious  Normal   Affect:    Appropriate  Lethargic    Thought Content:  Clear    Thought Form:  Coherent  Logical    Insight:    Good  and Intellectual Insight     Medication Review:   No current psychiatric medications prescribed     Medication Compliance:   NA     Changes in Health Issues:   None reported     Chemical Use Review:   Substance Use: Chemical use reviewed, no active concerns identified      Tobacco  Use: No change in amount of tobacco use since last session.  Provided encouragement to quit   Patient declined discussion at this time    Diagnosis:  1. Adjustment disorder with depressed mood      Collateral Reports Completed:   Not Applicable    PLAN: (Patient Tasks / Therapist Tasks / Other)  Write a letter to your grand-father (Sebastian)  Continue to engage in regular meal prep and working out routine  Continue to focus on communication in relationships  Keep using positive self-affirmations    Judd Clemons, [facility]                                                         ______________________________________________________________________    Individual Treatment Plan    Patient's Name: Sofi Escobedo  YOB: 1995    Date of Creation: 4/26/23  Date Treatment Plan Last Reviewed/Revised: 2/16/24    DSM5 Diagnoses: Adjustment Disorders  309.0 (F43.21) With depressed mood  Psychosocial / Contextual Factors: Pt recently moved to [place] from [place] and has been dealing with the culture shift. Pt has also been working on trying to get himself established here which has been challenging.  PROMIS (reviewed every 90 days):   PROMIS-10 Scores        3/1/2024     9:19 AM 3/15/2024     1:01 PM 3/29/2024     9:31 AM   PROMIS-10 Total Score w/o Sub Scores   PROMIS TOTAL - SUBSCORES 29    29 28    28 34       Referral / Collaboration:  Referral to another professional/service is not indicated at this time..    Anticipated number of session for this episode of care: 9-12 sessions  Anticipation frequency of session: Weekly  Anticipated Duration of each session: 38-52 minutes  Treatment plan will be reviewed in 90 days or when goals have been changed.       MeasurableTreatment Goal(s) related to diagnosis / functional impairment(s)  Goal 1: Patient will better manage his anger/frustration, not getting as easily aggravated, and not letting comments get to him as easily.    I will know I've met my goal when I am able to  not let others get to me so much.      Objective #A (Patient Action)    Patient will identify triggers, thoughts, and current stressors which contribute to feelings of anxiety  identify patterns of escalation  (i.e. tightness in chest, flushed face, increased heart rate, clenched hands, etc.)  identify at least 3 techniques for intervening on the escalation  use cognitive strategies identified in therapy to challenge anxious thoughts  Increase interest, engagement, and pleasure in doing things  Decrease frequency and intensity of feeling down, depressed, hopeless  Identify negative self-talk and behaviors: challenge core beliefs, myths, and actions  Improve concentration, focus, and mindfulness in daily activities   identify 3 coping strategies for improving mood  learn at least 3 self-regulation strategies.  Status: Continued - Date(s): 2/16/24    Intervention(s)  Therapist will assign homework related to target objective with the intent to promote pt autonomy and better manage MH.  Facilitate increase in self-awareness  Provide psycho-education on emotion identification/regulation and coping skills  Teach/promote utilization of mindfulness skills and grounding    Goal 2: Patient will feel worthy and be able to find validation internally.     I will know I've met my goal when able to feel more complete or worthy without needing it externally.      Objective #A (Patient Action)    Status: Continued - Date(s): 2/16/24    Patient will identify and compliment positives at least 3 times daily to support positive self-image  Decrease frequency and intensity of feeling down, depressed, hopeless  Identify negative self-talk and behaviors: challenge core beliefs, myths, and actions  identify 5 traits or charcteristics you like about yourself  identify at least 3 self-care activities.    Intervention(s)  Therapist will assign homework related to target objective with the intent to promote pt autonomy and better manage  MH.  Facilitate increase in self-awareness  Provide psycho-education on self-care/compassion and narrative therapy interventions  Teach/promote utilization of reframing and boundary setting    Objective #B Being more authentic with myself and others through improving my self-esteem.   Patient will participate in social activities to improve mood  learn & utilize at least 3 assertive communication skills weekly  identify 5 traits or charcteristics you like about yourself  identify at least 3 adaptive coping statements to counteract negative thoughts.    Status: Continued - Date(s): 2/16/24    Intervention(s)  Therapist will assign homework related to target objective with the intent to promote pt autonomy and better manage MH.  Facilitate increase in self-awareness  Provide psycho-education on self-esteem building and self-exploration  Teach/promote utilization of positive self-affirmations and critical observation skills    Patient has reviewed and agreed to the above plan.      Judd Clemons, [facility]  April 26, 2023

## 2024-04-12 NOTE — PROGRESS NOTES
Assessment & Plan     Throat pain    Throat is swollen, erythematous with edema  Strep neg  Motrin for throat pain  Phenol for throat pain    - Streptococcus A Rapid Screen w/Reflex to PCR - Clinic Collect  - Group A Streptococcus PCR Throat Swab  - ibuprofen (ADVIL/MOTRIN) 800 MG tablet; Take 1 tablet (800 mg) by mouth every 8 hours as needed for moderate pain  - phenol (CHLORASEPTIC) 1.4 % spray; Take 1 spray (1 mL) by mouth every hour as needed for sore throat    Fever    A fever is a high body temperature and is the body's reaction to an illness. It's one way your body fights illness. A temperature of up to 102 F can be helpful, because it helps the body respond to infection. Most healthy people can have a fever as high as 103 F to 104 F for short periods of time without problems.  Its important to stay well hydrated with a fever and avoid being in the heat.  A fever can be treated with medications provided, but If symptoms worsen then be seen by your PCP or go to the .     - Influenza A & B Antigen - Clinic Collect    Knee injury, right, initial encounter    Knee xray Negative for acute findings, read by Delroy Morse PA-C [facility] at time of visit.    DME knee brace  Motrin for knee pain, tenderness    - XR Knee Right 3 Views; Future  - ibuprofen (ADVIL/MOTRIN) 800 MG tablet; Take 1 tablet (800 mg) by mouth every 8 hours as needed for moderate pain  - Ankle/Knee Bracing Supplies Order Knee Sleeve/Brace; Right; Closed    Bacterial conjunctivitis of both eyes    You are being treated for bacterial conjunctivitis.  The most common symptoms of conjunctivitis include a thick, pus-like discharge from the eye, swollen eyelids, redness, eyelids sticking together upon awakening, and a gritty or scratchy feeling in the eye. You have been given an antibiotic eye drop to treat this infection.  With treatment, the infection takes about 7 days to clear up.   Home care  Use prescribed antibiotic eye drops or ointment as  directed to treat the infection.  Apply a warm compress (towel soaked in warm water) to the affected eye 3 to 4 times a day. Do this just before applying medicine to the eye.  Use a warm, wet cloth to wipe away crusting of the eyelids in the morning. This is caused by mucus drainage during the night.     - tobramycin (TOBREX) 0.3 % ophthalmic solution; Place 1-2 drops into the right eye every 4 hours for 7 days    Bacterial tonsillitis    This is a bacterial infection that can be spread to others. It's spread by coughing, kissing, sharing glasses or eating utensils, or by touching others after touching your mouth or nose. It's treated with antibiotic medicine. You should start to feel better in 1 to 2 days with treatment.  Strep throat is contagious for 24 hrs after starting antibiotics.     - amoxicillin (AMOXIL) 875 MG tablet; Take 1 tablet (875 mg) by mouth 2 times daily for 10 days      At today's visit with Sofi Escobedo , we discussed results, diagnosis, medications and formulated a plan.  We also discussed red flags for immediate return to clinic/ER, as well as indications for follow up with PCP if not improved in 3 days. Patient understood and agreed to plan. Sofi Escobedo was discharged with stable vitals and has no further questions.       No follow-ups on file.    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 Pt noticed swelling and limping if on right knee for a long period of time. Possible tendonitis )    HPI     Review of Systems  Constitutional, HEENT, cardiovascular, pulmonary, gi and gu systems are negative, except as otherwise noted.      Objective    BP (!) 141/82 (BP Location: Left arm, Patient Position: Sitting, Cuff Size: Adult Large)   Pulse 86   Temp 99.2  F (37.3  C) (Tympanic)    Wt 115.5 kg (254 lb 9.6 oz)   SpO2 96%   BMI 38.01 kg/m    Body mass index is 38.01 kg/m .  Physical Exam   GENERAL: alert and no distress  EYES: conjunctiva/corneas- conjunctival injection OU  HENT: normal cephalic/atraumatic, ear canals and TM's normal, nose and mouth without ulcers or lesions, tonsillar hypertrophy, tonsillar erythema, and swelling  NECK: cervical adenopathy anterior  RESP: lungs clear to auscultation - no rales, rhonchi or wheezes  CV: regular rate and rhythm, normal S1 S2, no S3 or S4, no murmur, click or rub, no peripheral edema  MS: Pos for medial knee pain,  no laxity or instability of knee  SKIN: no suspicious lesions or rashes  NEURO: Normal strength and tone, mentation intact and speech normal  PSYCH: mentation appears normal, affect normal/bright    Xray - Reviewed and interpreted by me.  Negative for acute findings, read by Delroy OTT at time of visit.          Signed Electronically by: NAMRATA Carney, GENTRY

## 2024-04-19 ENCOUNTER — DOCUMENTATION ONLY (OUTPATIENT)
Dept: BEHAVIORAL HEALTH | Facility: HOSPITAL | Age: 29
End: 2024-04-19
Payer: COMMERCIAL

## 2024-04-19 NOTE — PROGRESS NOTES
Therapist (writer) entered the virtual session at the scheduled time of the appointment. Pt did not arrive on time and therapist called pt 10 minutes into the appointment, to connect with pt about today's session. Pt answered and apologized that he forgot  about it. Pt was unable to meet and agreed to attend next weeks session, with date/time being confirmed. As pt forgot about today's appointment and did not cancel beforehand it was ended as a no show.

## 2024-04-26 ENCOUNTER — VIRTUAL VISIT (OUTPATIENT)
Dept: PSYCHOLOGY | Facility: CLINIC | Age: 29
End: 2024-04-26
Payer: COMMERCIAL

## 2024-04-26 DIAGNOSIS — F43.21 ADJUSTMENT DISORDER WITH DEPRESSED MOOD: Primary | ICD-10-CM

## 2024-04-26 PROCEDURE — 90837 PSYTX W PT 60 MINUTES: CPT | Mod: 95 | Performed by: COUNSELOR

## 2024-04-26 NOTE — PROGRESS NOTES
[facility]                                     Progress Note    Patient Name: Sofi Escobedo  Date: 4/26/24         Service Type: Individual      Session Start Time: 5:02 pm  Session End Time: 5:58 pm     Session Length: 56 minutes    Session #: 35    Attendees: Client attended alone    Service Modality:  Video Visit:      Provider verified identity through the following two step process.  Patient provided:  Patient is known previously to provider    Telemedicine Visit: The patient's condition can be safely assessed and treated via synchronous audio and visual telemedicine encounter.      Reason for Telemedicine Visit: Services only offered telehealth    Originating Site (Patient Location): Patient's home    Distant Site (Provider Location): Provider Remote Setting- Home Office    Consent:  The patient/guardian has verbally consented to: the potential risks and benefits of telemedicine (video visit) versus in person care; bill my insurance or make self-payment for services provided; and responsibility for payment of non-covered services.     Patient would like the video invitation sent by:  My Chart    Mode of Communication:  Video Conference via [employer]    Distant Location (Provider):  Off-site    As the provider I attest to compliance with applicable laws and regulations related to telemedicine.    DATA  Interactive Complexity: No  Crisis: No  Extended Session (53+ minutes): PROLONGED SERVICE IN THE OUTPATIENT SETTING REQUIRING DIRECT (FACE-TO-FACE) PATIENT CONTACT BEYOND THE USUAL SERVICE:    - Longer session due to limited access to mental health appointments and necessity to address patient's distress / complexity    - Patient's presenting concerns require more intensive intervention than could be completed within the usual service     Progress Since Last Session (Related to Symptoms / Goals / Homework):   Symptoms: Improving pt has been feeling better overall    Homework: Achieved  / completed to satisfaction      Episode of Care Goals: Satisfactory progress - ACTION (Actively working towards change); Intervened by reinforcing change plan / affirming steps taken     Current / Ongoing Stressors and Concerns:   Pt is currently seeking therapy due to ongoing anxiety/depression and to better manage his temper. Since last session, pt started his new job and has found it to be a much better fit. Pt has found that he has had trouble keeping a regular sleep schedule since starting his new job. Pt thinks this was in part due to his previous job throwing a wrench in his plans to adjust his sleep schedule. Pt is thinking that this weekend he will be able to get himself back onto a better sleep schedule and confirmed that he has been using good sleep hygiene this past week to try to support this. Pt has been working out more now and that has helped him feel better. Pt did get the unfortunate news that two people he knew back in [place] [place] had  earlier this week, which was difficult, though has been handling that well. Pt recounted how he has been trying to reconnect with some folks back in [place] [place], which has caused a bit a drama for him internally. Pt did reconnect with someone back here in [place] which went better. Pt reflected that he has actively been trying to be more honest, which has felt bitter sweet and yet he is happy he has been doing it as it lest him be his true self. After thinking on it more, pt verbalized recognizing that his confidence has come back in many way and is actually starting to feel happy again. Pt processed through his recent relationship situations and how that has been effecting him. Pt reflected that the lady he has been dating for a while and him are on different pages, with him identifying that he can't meet her needs at this time as he wants to focus more on himself still. Pt talked through how he still struggle with trust issues and believing that others are accepting him for  him and that this can make it hard to connect on a deeper level in his relationships. Pt is doing better with letting old relationships go and recognize that is not a reflection on him as failing somehow. Pt is also coming to feel more in line with his personal values of being a one woman kind of urban, and yet doesn't feel he is there yet. Session went longer then anticipated due to presenting concerns and lack of recent therapy sessions.     Personal Goals:  Good Credit   license  House  Be with a partner that is invested in pt and is financially independent     Treatment Objective(s) Addressed in This Session:   identify and compliment positives at least 3 times daily to support positive self-image  identify triggers, thoughts, and current stressors which contribute to feelings of anxiety  identify patterns of escalation  (i.e. tightness in chest, flushed face, increased heart rate, clenched hands, etc.)  identify at least 3 techniques for intervening on the escalation  use cognitive strategies identified in therapy to challenge anxious thoughts  Decrease frequency and intensity of feeling down, depressed, hopeless  Identify negative self-talk and behaviors: challenge core beliefs, myths, and actions  identify two areas of life that you would like to have improved functioning  identify at least 3 self-care activities     Intervention:   CBT: Reviewed recently behavior patterns and environmental factors that impact them    Motivational Interviewing    MI Intervention: Expressed Empathy/Understanding, Supported Autonomy, Collaboration, Evocation, Open-ended questions, Reflections: simple and complex, Change talk (evoked), and Reframe     Change Talk Expressed by the Patient: Desire to change Ability to change Reasons to change Need to change Committment to change Activation Taking steps    Provider Response to Change Talk: E - Evoked more info from patient about behavior change, A - Affirmed patient's  thoughts, decisions, or attempts at behavior change, R - Reflected patient's change talk, and S - Summarized patient's change talk statements    Psychodynamic: Processed through internal-experiences related to interpersonal relationships  Solution Focused: Identified self-imposed barriers and how he has worked to overcome them    Assessments completed prior to visit:  PHQ2:       4/12/2024    12:27 PM 3/29/2024     9:30 AM 3/22/2024     1:01 PM 12/19/2023     9:58 AM 9/26/2023     9:48 AM 9/19/2023     9:30 AM 6/19/2023     4:01 PM   PHQ-2 ( 1999 [employer])   Q1: Little interest or pleasure in doing things 1 1 1 1 1 1 0   Q2: Feeling down, depressed or hopeless 1 1 1 1 1 1 0   PHQ-2 Score 2 2 2 2 2 2 0   Q1: Little interest or pleasure in doing things Several days Several days Several days Several days Several days Several days Not at all   Q2: Feeling down, depressed or hopeless Several days Several days Several days Several days Several days Several days Not at all   PHQ-2 Score 2 2 2 2 2 2 0     PHQ9:       4/12/2023     4:37 PM 1/11/2024     4:02 PM   PHQ-9 SCORE   PHQ-9 Total Score MyChart 5 (Mild depression) 9 (Mild depression)   PHQ-9 Total Score 5 9     GAD2:       7/18/2023     8:25 AM 10/17/2023     9:53 AM 10/30/2023    12:07 PM 12/19/2023     9:58 AM 3/20/2024    10:41 PM 3/29/2024     9:30 AM 4/5/2024     9:51 AM   BISHOP-2   Feeling nervous, anxious, or on edge 0 1 1 1 1 1 1   Not being able to stop or control worrying 0 1 1 1 1 1 1   BISHOP-2 Total Score 0    0 2    2 2 2 2 2 2        ASSESSMENT: Current Emotional / Mental Status (status of significant symptoms):   Risk status (Self / Other harm or suicidal ideation)   Patient denies current fears or concerns for personal safety.   Patient denies current or recent suicidal ideation or behaviors.   Patient denies current or recent homicidal ideation or behaviors.   Patient denies current or recent self injurious behavior or ideation.   Patient denies other  safety concerns.   Patient reports there has been no change in risk factors since their last session.     Patient reports there has been no change in protective factors since their last session.     Recommended that patient call 911 or go to the local ED should there be a change in any of these risk factors.     Appearance:   Appropriate    Eye Contact:   Good    Psychomotor Behavior: Normal    Attitude:   Cooperative  Interested Friendly Pleasant   Orientation:   All   Speech    Rate / Production: Normal/ Responsive Talkative    Volume:  Normal    Mood:    Anxious  Normal   Affect:    Appropriate    Thought Content:  Clear    Thought Form:  Coherent  Logical    Insight:    Good  and Intellectual Insight     Medication Review:   No current psychiatric medications prescribed     Medication Compliance:   NA     Changes in Health Issues:   None reported     Chemical Use Review:   Substance Use: Chemical use reviewed, no active concerns identified      Tobacco Use: No change in amount of tobacco use since last session.  Provided encouragement to quit   Patient declined discussion at this time    Diagnosis:  1. Adjustment disorder with depressed mood      Collateral Reports Completed:   Not Applicable    PLAN: (Patient Tasks / Therapist Tasks / Other)  Keep working on being honest and using tact  Continue to use sleep hygiene skills and working out to burn energy  Talk with partner about relationship status  Follow-through on training for work    LADONNA aPez                                                         ______________________________________________________________________    Individual Treatment Plan    Patient's Name: Sofi Escobedo  YOB: 1995    Date of Creation: 4/26/23  Date Treatment Plan Last Reviewed/Revised: 2/16/24    DSM5 Diagnoses: Adjustment Disorders  309.0 (F43.21) With depressed mood  Psychosocial / Contextual Factors: Pt recently moved to [place] from [place] and has  been dealing with the culture shift. Pt has also been working on trying to get himself established here which has been challenging.  PROMIS (reviewed every 90 days):   PROMIS-10 Scores        3/1/2024     9:19 AM 3/15/2024     1:01 PM 3/29/2024     9:31 AM   PROMIS-10 Total Score w/o Sub Scores   PROMIS TOTAL - SUBSCORES 29    29 28    28 34       Referral / Collaboration:  Referral to another professional/service is not indicated at this time..    Anticipated number of session for this episode of care: 9-12 sessions  Anticipation frequency of session: Weekly  Anticipated Duration of each session: 38-52 minutes  Treatment plan will be reviewed in 90 days or when goals have been changed.       MeasurableTreatment Goal(s) related to diagnosis / functional impairment(s)  Goal 1: Patient will better manage his anger/frustration, not getting as easily aggravated, and not letting comments get to him as easily.    I will know I've met my goal when I am able to not let others get to me so much.      Objective #A (Patient Action)    Patient will identify triggers, thoughts, and current stressors which contribute to feelings of anxiety  identify patterns of escalation  (i.e. tightness in chest, flushed face, increased heart rate, clenched hands, etc.)  identify at least 3 techniques for intervening on the escalation  use cognitive strategies identified in therapy to challenge anxious thoughts  Increase interest, engagement, and pleasure in doing things  Decrease frequency and intensity of feeling down, depressed, hopeless  Identify negative self-talk and behaviors: challenge core beliefs, myths, and actions  Improve concentration, focus, and mindfulness in daily activities   identify 3 coping strategies for improving mood  learn at least 3 self-regulation strategies.  Status: Continued - Date(s): 2/16/24    Intervention(s)  Therapist will assign homework related to target objective with the intent to promote pt autonomy and  better manage MH.  Facilitate increase in self-awareness  Provide psycho-education on emotion identification/regulation and coping skills  Teach/promote utilization of mindfulness skills and grounding    Goal 2: Patient will feel worthy and be able to find validation internally.     I will know I've met my goal when able to feel more complete or worthy without needing it externally.      Objective #A (Patient Action)    Status: Continued - Date(s): 2/16/24    Patient will identify and compliment positives at least 3 times daily to support positive self-image  Decrease frequency and intensity of feeling down, depressed, hopeless  Identify negative self-talk and behaviors: challenge core beliefs, myths, and actions  identify 5 traits or charcteristics you like about yourself  identify at least 3 self-care activities.    Intervention(s)  Therapist will assign homework related to target objective with the intent to promote pt autonomy and better manage MH.  Facilitate increase in self-awareness  Provide psycho-education on self-care/compassion and narrative therapy interventions  Teach/promote utilization of reframing and boundary setting    Objective #B Being more authentic with myself and others through improving my self-esteem.   Patient will participate in social activities to improve mood  learn & utilize at least 3 assertive communication skills weekly  identify 5 traits or charcteristics you like about yourself  identify at least 3 adaptive coping statements to counteract negative thoughts.    Status: Continued - Date(s): 2/16/24    Intervention(s)  Therapist will assign homework related to target objective with the intent to promote pt autonomy and better manage MH.  Facilitate increase in self-awareness  Provide psycho-education on self-esteem building and self-exploration  Teach/promote utilization of positive self-affirmations and critical observation skills    Patient has reviewed and agreed to the above  plan.      Judd Clemons, [facility]  April 26, 2023

## 2024-05-10 ENCOUNTER — VIRTUAL VISIT (OUTPATIENT)
Dept: PSYCHOLOGY | Facility: CLINIC | Age: 29
End: 2024-05-10
Payer: COMMERCIAL

## 2024-05-10 DIAGNOSIS — F43.21 ADJUSTMENT DISORDER WITH DEPRESSED MOOD: Primary | ICD-10-CM

## 2024-05-10 PROCEDURE — 90837 PSYTX W PT 60 MINUTES: CPT | Mod: 95 | Performed by: COUNSELOR

## 2024-05-10 NOTE — PROGRESS NOTES
[facility]                                     Progress Note    Patient Name: Sofi Escobedo  Date: 5/10/24         Service Type: Individual      Session Start Time: 5:00 pm  Session End Time: 6:00 pm     Session Length: 60 minutes    Session #: 36    Attendees: Client attended alone    Service Modality:  Video Visit:      Provider verified identity through the following two step process.  Patient provided:  Patient is known previously to provider    Telemedicine Visit: The patient's condition can be safely assessed and treated via synchronous audio and visual telemedicine encounter.      Reason for Telemedicine Visit: Services only offered telehealth    Originating Site (Patient Location): Patient's home    Distant Site (Provider Location): Provider Remote Setting- Home Office    Consent:  The patient/guardian has verbally consented to: the potential risks and benefits of telemedicine (video visit) versus in person care; bill my insurance or make self-payment for services provided; and responsibility for payment of non-covered services.     Patient would like the video invitation sent by:  My Chart    Mode of Communication:  Video Conference via [employer]    Distant Location (Provider):  Off-site    As the provider I attest to compliance with applicable laws and regulations related to telemedicine.    DATA  Interactive Complexity: No  Crisis: No  Extended Session (53+ minutes): PROLONGED SERVICE IN THE OUTPATIENT SETTING REQUIRING DIRECT (FACE-TO-FACE) PATIENT CONTACT BEYOND THE USUAL SERVICE:    - Patient's presenting concerns require more intensive intervention than could be completed within the usual service     Progress Since Last Session (Related to Symptoms / Goals / Homework):   Symptoms: Improving pt has overall been feeling like he has been handling things okay despite recent frustrations    Homework: Achieved / completed to satisfaction      Episode of Care Goals: Satisfactory  progress - PREPARATION (Decided to change - considering how); Intervened by negotiating a change plan and determining options / strategies for behavior change, identifying triggers, exploring social supports, and working towards setting a date to begin behavior change     Current / Ongoing Stressors and Concerns:   Pt is currently seeking therapy due to ongoing anxiety/depression and to better manage his temper. Since last session, pt has found that the new job is a good fit and yet ended up having a talk at work about being on time, though that was a positive conversation overall. Pt did have to deal with frustration with his past employer again and that is hopefully the last time he will need to deal with that. Pt processed through how he has been dealing with his recent stressors. Pt reflected that at work he feels pressure to perform due to learning that he has been singled out as a good worker from his previous job by his references. Pt also reflected on his relationship status and navigating his current dating scenarios. Pt acknowledged that he is struggling to find a balance of meeting his own needs while also being able to meet the needs of his perspective partners. Pt verbalized frustration about be told he doesn't give enough time for his partners, when he feels that he gives all he can, which results in him internalizing this as doing something wrong/failing. After talking through it pt was able to recognize that if he is giving everything he can and the other person does not think it is enough, this is a possible sign that they aren't a good fit for him and not because he intrinsically did anything wrong. Pt identified that he tends to have a hard time letting go in relationships, and is able to see that he needs to because holding on tends to set himself up for ongoing failure. Pt acknowledged that he also tends to over-extend himself by trying to engage with to many women at once which may make it  challenging to actually give enough attention to a singular person. Pt briefly talked about dreams he has been having that feel like they connect to his feelings of abandonment and this issue he has with relationships. Session went longer then anticipated due to presenting concerns.     Personal Goals:  Good Credit   license  House  Be with a partner that is invested in pt and is financially independent     Treatment Objective(s) Addressed in This Session:   identify and compliment positives at least 3 times daily to support positive self-image  identify triggers, thoughts, and current stressors which contribute to feelings of anxiety  identify patterns of escalation  (i.e. tightness in chest, flushed face, increased heart rate, clenched hands, etc.)  identify at least 3 techniques for intervening on the escalation  use cognitive strategies identified in therapy to challenge anxious thoughts  Decrease frequency and intensity of feeling down, depressed, hopeless  Identify negative self-talk and behaviors: challenge core beliefs, myths, and actions  identify two areas of life that you would like to have improved functioning  identify at least 3 self-care activities     Intervention:   CBT: Reviewed recently behavior patterns and environmental factors that impact them    Motivational Interviewing    MI Intervention: Expressed Empathy/Understanding, Supported Autonomy, Collaboration, Evocation, Open-ended questions, Reflections: simple and complex, Change talk (evoked), and Reframe     Change Talk Expressed by the Patient: Desire to change Ability to change Reasons to change Need to change Committment to change Activation Taking steps    Provider Response to Change Talk: E - Evoked more info from patient about behavior change, A - Affirmed patient's thoughts, decisions, or attempts at behavior change, R - Reflected patient's change talk, and S - Summarized patient's change talk  statements    Psychodynamic: Processed through internal-experiences related to interpersonal relationships  Solution Focused: Identified self-imposed barriers and how he has worked to overcome them    Assessments completed prior to visit:  PHQ2:       4/12/2024    12:27 PM 3/29/2024     9:30 AM 3/22/2024     1:01 PM 12/19/2023     9:58 AM 9/26/2023     9:48 AM 9/19/2023     9:30 AM 6/19/2023     4:01 PM   PHQ-2 ( 1999 [employer])   Q1: Little interest or pleasure in doing things 1 1 1 1 1 1 0   Q2: Feeling down, depressed or hopeless 1 1 1 1 1 1 0   PHQ-2 Score 2 2 2 2 2 2 0   Q1: Little interest or pleasure in doing things Several days Several days Several days Several days Several days Several days Not at all   Q2: Feeling down, depressed or hopeless Several days Several days Several days Several days Several days Several days Not at all   PHQ-2 Score 2 2 2 2 2 2 0     PHQ9:       4/12/2023     4:37 PM 1/11/2024     4:02 PM   PHQ-9 SCORE   PHQ-9 Total Score MyChart 5 (Mild depression) 9 (Mild depression)   PHQ-9 Total Score 5 9     GAD2:       7/18/2023     8:25 AM 10/17/2023     9:53 AM 10/30/2023    12:07 PM 12/19/2023     9:58 AM 3/20/2024    10:41 PM 3/29/2024     9:30 AM 4/5/2024     9:51 AM   BISHOP-2   Feeling nervous, anxious, or on edge 0 1 1 1 1 1 1   Not being able to stop or control worrying 0 1 1 1 1 1 1   BISHOP-2 Total Score 0    0 2    2 2 2 2 2 2        ASSESSMENT: Current Emotional / Mental Status (status of significant symptoms):   Risk status (Self / Other harm or suicidal ideation)   Patient denies current fears or concerns for personal safety.   Patient denies current or recent suicidal ideation or behaviors.   Patient denies current or recent homicidal ideation or behaviors.   Patient denies current or recent self injurious behavior or ideation.   Patient denies other safety concerns.   Patient reports there has been no change in risk factors since their last session.     Patient reports there has  been no change in protective factors since their last session.     Recommended that patient call 911 or go to the local ED should there be a change in any of these risk factors.     Appearance:   Appropriate    Eye Contact:   Good    Psychomotor Behavior: Normal    Attitude:   Cooperative  Interested Friendly Pleasant   Orientation:   All   Speech    Rate / Production: Normal/ Responsive Talkative    Volume:  Normal    Mood:    Anxious  Normal Sad    Affect:    Appropriate  frustrated    Thought Content:  Clear    Thought Form:  Coherent  Logical    Insight:    Good  and Intellectual Insight     Medication Review:   No current psychiatric medications prescribed     Medication Compliance:   NA     Changes in Health Issues:   None reported     Chemical Use Review:   Substance Use: Chemical use reviewed, no active concerns identified      Tobacco Use: No change in amount of tobacco use since last session.  Provided encouragement to quit   Patient declined discussion at this time    Diagnosis:  1. Adjustment disorder with depressed mood        Collateral Reports Completed:   Not Applicable    PLAN: (Patient Tasks / Therapist Tasks / Other)  Continue to use sleep hygiene skills and working out to burn energy  Check-in with self about feelings of rejection/worthiness  Tel yourself you are good enough if you have dreams/thoughts that say you aren't  Get to work on time and focus on one day at a time    [facility]                                                         ______________________________________________________________________    Individual Treatment Plan    Patient's Name: Sofi Escobedo  YOB: 1995    Date of Creation: 4/26/23  Date Treatment Plan Last Reviewed/Revised: 2/16/24    DSM5 Diagnoses: Adjustment Disorders  309.0 (F43.21) With depressed mood  Psychosocial / Contextual Factors: Pt recently moved to [place] from [place] and has been dealing with the culture shift. Pt has also  been working on trying to get himself established here which has been challenging.  PROMIS (reviewed every 90 days):   PROMIS-10 Scores        3/1/2024     9:19 AM 3/15/2024     1:01 PM 3/29/2024     9:31 AM   PROMIS-10 Total Score w/o Sub Scores   PROMIS TOTAL - SUBSCORES 29    29 28    28 34       Referral / Collaboration:  Referral to another professional/service is not indicated at this time..    Anticipated number of session for this episode of care: 9-12 sessions  Anticipation frequency of session: Weekly  Anticipated Duration of each session: 38-52 minutes  Treatment plan will be reviewed in 90 days or when goals have been changed.       MeasurableTreatment Goal(s) related to diagnosis / functional impairment(s)  Goal 1: Patient will better manage his anger/frustration, not getting as easily aggravated, and not letting comments get to him as easily.    I will know I've met my goal when I am able to not let others get to me so much.      Objective #A (Patient Action)    Patient will identify triggers, thoughts, and current stressors which contribute to feelings of anxiety  identify patterns of escalation  (i.e. tightness in chest, flushed face, increased heart rate, clenched hands, etc.)  identify at least 3 techniques for intervening on the escalation  use cognitive strategies identified in therapy to challenge anxious thoughts  Increase interest, engagement, and pleasure in doing things  Decrease frequency and intensity of feeling down, depressed, hopeless  Identify negative self-talk and behaviors: challenge core beliefs, myths, and actions  Improve concentration, focus, and mindfulness in daily activities   identify 3 coping strategies for improving mood  learn at least 3 self-regulation strategies.  Status: Continued - Date(s): 2/16/24    Intervention(s)  Therapist will assign homework related to target objective with the intent to promote pt autonomy and better manage MH.  Facilitate increase in  self-awareness  Provide psycho-education on emotion identification/regulation and coping skills  Teach/promote utilization of mindfulness skills and grounding    Goal 2: Patient will feel worthy and be able to find validation internally.     I will know I've met my goal when able to feel more complete or worthy without needing it externally.      Objective #A (Patient Action)    Status: Continued - Date(s): 2/16/24    Patient will identify and compliment positives at least 3 times daily to support positive self-image  Decrease frequency and intensity of feeling down, depressed, hopeless  Identify negative self-talk and behaviors: challenge core beliefs, myths, and actions  identify 5 traits or charcteristics you like about yourself  identify at least 3 self-care activities.    Intervention(s)  Therapist will assign homework related to target objective with the intent to promote pt autonomy and better manage MH.  Facilitate increase in self-awareness  Provide psycho-education on self-care/compassion and narrative therapy interventions  Teach/promote utilization of reframing and boundary setting    Objective #B Being more authentic with myself and others through improving my self-esteem.   Patient will participate in social activities to improve mood  learn & utilize at least 3 assertive communication skills weekly  identify 5 traits or charcteristics you like about yourself  identify at least 3 adaptive coping statements to counteract negative thoughts.    Status: Continued - Date(s): 2/16/24    Intervention(s)  Therapist will assign homework related to target objective with the intent to promote pt autonomy and better manage MH.  Facilitate increase in self-awareness  Provide psycho-education on self-esteem building and self-exploration  Teach/promote utilization of positive self-affirmations and critical observation skills    Patient has reviewed and agreed to the above plan.      LADONNA Paez  April 26,  2023

## 2024-05-24 ENCOUNTER — DOCUMENTATION ONLY (OUTPATIENT)
Dept: BEHAVIORAL HEALTH | Facility: HOSPITAL | Age: 29
End: 2024-05-24
Payer: COMMERCIAL

## 2024-05-24 NOTE — PROGRESS NOTES
Therapist (writer) entered the virtual session at the scheduled time of the appointment. Pt did not arrive on time and therapist called pt 10 minutes into the appointment, leaving a VM prompting them to join via [employer] or call the office or reach out through [employer] if they were having an issue. Pt was informed of the date/time of their next follow-up appointment and was encouraged to attend if they were unable to make today's appointment. Therapist waited an addition 10-15 minutes for pt to join and when they did not the session was cancelled.

## 2024-06-03 ENCOUNTER — LAB (OUTPATIENT)
Dept: LAB | Facility: CLINIC | Age: 29
End: 2024-06-03
Payer: COMMERCIAL

## 2024-06-03 DIAGNOSIS — Z11.3 ROUTINE SCREENING FOR STI (SEXUALLY TRANSMITTED INFECTION): ICD-10-CM

## 2024-06-03 PROCEDURE — 87389 HIV-1 AG W/HIV-1&-2 AB AG IA: CPT

## 2024-06-03 PROCEDURE — 87591 N.GONORRHOEAE DNA AMP PROB: CPT

## 2024-06-03 PROCEDURE — 86780 TREPONEMA PALLIDUM: CPT

## 2024-06-03 PROCEDURE — 87491 CHLMYD TRACH DNA AMP PROBE: CPT

## 2024-06-03 PROCEDURE — 36415 COLL VENOUS BLD VENIPUNCTURE: CPT

## 2024-06-07 ENCOUNTER — VIRTUAL VISIT (OUTPATIENT)
Dept: PSYCHOLOGY | Facility: CLINIC | Age: 29
End: 2024-06-07
Payer: COMMERCIAL

## 2024-06-07 DIAGNOSIS — F43.23 ADJUSTMENT DISORDER WITH MIXED ANXIETY AND DEPRESSED MOOD: Primary | ICD-10-CM

## 2024-06-07 PROCEDURE — 90837 PSYTX W PT 60 MINUTES: CPT | Mod: 95 | Performed by: COUNSELOR

## 2024-06-07 NOTE — PROGRESS NOTES
[facility]                                     Progress Note    Patient Name: Sofi Escobedo  Date: 6/07/24         Service Type: Individual      Session Start Time: 5:00 pm  Session End Time: 6:10 pm     Session Length: 70 minutes    Session #: 37    Attendees: Client attended alone    Service Modality:  Video Visit:      Provider verified identity through the following two step process.  Patient provided:  Patient is known previously to provider    Telemedicine Visit: The patient's condition can be safely assessed and treated via synchronous audio and visual telemedicine encounter.      Reason for Telemedicine Visit: Services only offered telehealth    Originating Site (Patient Location): Patient's home    Distant Site (Provider Location): Provider Remote Setting- Home Office    Consent:  The patient/guardian has verbally consented to: the potential risks and benefits of telemedicine (video visit) versus in person care; bill my insurance or make self-payment for services provided; and responsibility for payment of non-covered services.     Patient would like the video invitation sent by:  My Chart    Mode of Communication:  Video Conference via [employer]    Distant Location (Provider):  Off-site    As the provider I attest to compliance with applicable laws and regulations related to telemedicine.    DATA  Interactive Complexity: No  Crisis: No  Extended Session (53+ minutes): PROLONGED SERVICE IN THE OUTPATIENT SETTING REQUIRING DIRECT (FACE-TO-FACE) PATIENT CONTACT BEYOND THE USUAL SERVICE:    - Patient's presenting concerns require more intensive intervention than could be completed within the usual service     Progress Since Last Session (Related to Symptoms / Goals / Homework):   Symptoms: Worsening pt has been feeling betrayed and hurt in his relationships    Homework: Partially completed      Episode of Care Goals: Satisfactory progress - PREPARATION (Decided to change -  considering how); Intervened by negotiating a change plan and determining options / strategies for behavior change, identifying triggers, exploring social supports, and working towards setting a date to begin behavior change     Current / Ongoing Stressors and Concerns:   Pt is currently seeking therapy due to ongoing anxiety/depression and to better manage his temper. Pt moved to [place] from [place] a few years ago and establishing a support system has been challenging due to regional cultural differences. Prior to moving to [place] pt has dealt with a lot of family drama and an unsupportive environment growing up, which pt thinks has contributed to his ongoing MH concerns. Since last session, pt reported that he has felt betrayed after finding out that his intermittent  girlfriend is going out of town with another man. Pt clarified that since they last broke up they have been spending time together, with her staying at his place throughout the week, but have kept it non-physical. Pt now realizes that during this time he still has feelings for her and based on what she has said to him, thought she had feelings for him too. So finding out that she has been romantically pursuing another man while they have been emotionally enmeshed hurt a lot. Pt clarified that he thinks her family, who he has become close to since moving to [place], knew that she was dating another man and yet has been making statements about pt getting back together which made pt feel like he was being made a fool or stabbed in the back. Pt connected this sense of betrayal to his own background with his family of origin and feeling like he never really had family he could trust or rely on. Pt was tearful when talking about this, verbalizing how he carries a lot of pain and feeling isolated now. Pt clarified that since coming to [place] he has felt out of place, both in the community and at work, where he doesn't see people who talk, look, or act like him. Pt  admitted that he doesn't feel he trust anyone and much of the time is not convinced that anyone really cares about him. Pt acknowledged that he does frame love and acceptance as conditional on him feeling like he needs to earn it, which he thinks is because of how his parents behaved towards him growing up. Pt reflected that he has been dating since they broke up as well and recognizes not that he has been seeking emotional fulfillment or trying to find a sense of connection, which has been superficial at this point due to his inability to be emotionally guarded given his past. Pt was able to recognize that this relationship with his ex is not actually meeting his emotional needs, and yet there is a sense of security in it that causes him to keep coming back to it. Pt was encouraged to consider how this pattern is helpful vs unhelpful, and how it may be reflective of a self-reinforcing cycle in his relationships. Pt agreed that this resonated with him and that he would need to think about it more. Session went longer then anticipated due to presenting concerns.     Personal Goals:  Good Credit   license  House  Be with a partner that is invested in pt and is financially independent     Treatment Objective(s) Addressed in This Session:   identify and compliment positives at least 3 times daily to support positive self-image  identify triggers, thoughts, and current stressors which contribute to feelings of anxiety  identify patterns of escalation  (i.e. tightness in chest, flushed face, increased heart rate, clenched hands, etc.)  identify at least 3 techniques for intervening on the escalation  use cognitive strategies identified in therapy to challenge anxious thoughts  Decrease frequency and intensity of feeling down, depressed, hopeless  Identify negative self-talk and behaviors: challenge core beliefs, myths, and actions  identify two areas of life that you would like to have improved  functioning  identify at least 3 self-care activities     Intervention:   CBT: Reviewed recently behavior patterns and environmental factors that impact them    Motivational Interviewing    MI Intervention: Expressed Empathy/Understanding, Supported Autonomy, Collaboration, Evocation, Open-ended questions, Reflections: simple and complex, Change talk (evoked), and Reframe     Change Talk Expressed by the Patient: Desire to change Ability to change Reasons to change Need to change Committment to change Activation Taking steps    Provider Response to Change Talk: E - Evoked more info from patient about behavior change, A - Affirmed patient's thoughts, decisions, or attempts at behavior change, R - Reflected patient's change talk, and S - Summarized patient's change talk statements    Psychodynamic: Processed through internal-experiences related to interpersonal relationships  Solution Focused: Identified self-imposed barriers and how he has worked to overcome them    Assessments completed prior to visit:  PHQ2:       4/12/2024    12:27 PM 3/29/2024     9:30 AM 3/22/2024     1:01 PM 12/19/2023     9:58 AM 9/26/2023     9:48 AM 9/19/2023     9:30 AM 6/19/2023     4:01 PM   PHQ-2 ( 1999 [employer])   Q1: Little interest or pleasure in doing things 1 1 1 1 1 1 0   Q2: Feeling down, depressed or hopeless 1 1 1 1 1 1 0   PHQ-2 Score 2 2 2 2 2 2 0   Q1: Little interest or pleasure in doing things Several days Several days Several days Several days Several days Several days Not at all   Q2: Feeling down, depressed or hopeless Several days Several days Several days Several days Several days Several days Not at all   PHQ-2 Score 2 2 2 2 2 2 0     PHQ9:       4/12/2023     4:37 PM 1/11/2024     4:02 PM   PHQ-9 SCORE   PHQ-9 Total Score MyChart 5 (Mild depression) 9 (Mild depression)   PHQ-9 Total Score 5 9     GAD2:       7/18/2023     8:25 AM 10/17/2023     9:53 AM 10/30/2023    12:07 PM 12/19/2023     9:58 AM 3/20/2024    10:41 PM  3/29/2024     9:30 AM 4/5/2024     9:51 AM   BISHOP-2   Feeling nervous, anxious, or on edge 0 1 1 1 1 1 1   Not being able to stop or control worrying 0 1 1 1 1 1 1   BISHOP-2 Total Score 0    0 2    2 2 2 2 2 2        ASSESSMENT: Current Emotional / Mental Status (status of significant symptoms):   Risk status (Self / Other harm or suicidal ideation)   Patient denies current fears or concerns for personal safety.   Patient denies current or recent suicidal ideation or behaviors.   Patient denies current or recent homicidal ideation or behaviors.   Patient denies current or recent self injurious behavior or ideation.   Patient denies other safety concerns.   Patient reports there has been no change in risk factors since their last session.     Patient reports there has been no change in protective factors since their last session.     Recommended that patient call 911 or go to the local ED should there be a change in any of these risk factors.     Appearance:   Appropriate    Eye Contact:   Good    Psychomotor Behavior: Normal    Attitude:   Cooperative  Interested Friendly Pleasant   Orientation:   All   Speech    Rate / Production: Normal/ Responsive Emotional Talkative    Volume:  Normal    Mood:    Angry  Anxious  Depressed  Normal   Affect:    Appropriate  Tearful frustrated    Thought Content:  Clear    Thought Form:  Coherent  Logical    Insight:    Fair , External locus, and Intellectual Insight     Medication Review:   No current psychiatric medications prescribed     Medication Compliance:   NA     Changes in Health Issues:   None reported     Chemical Use Review:   Substance Use: Chemical use reviewed, no active concerns identified      Tobacco Use: No change in amount of tobacco use since last session.  Provided encouragement to quit   Patient declined discussion at this time    Diagnosis:  1. Adjustment disorder with depressed mood        Collateral Reports Completed:   Not Applicable    PLAN: (Patient Tasks  / Therapist Tasks / Other)  Check-in with self about feelings of rejection/worthiness  Reflect on helpful vs unhelpful relationship dynamics  Reach out to friends and family for ongoing support  Use coping skills discussed in therapy    LADONNA Paez                                                         ______________________________________________________________________    Individual Treatment Plan    Patient's Name: Sofi Escobedo  YOB: 1995    Date of Creation: 4/26/23  Date Treatment Plan Last Reviewed/Revised: 6/7/24    DSM5 Diagnoses: Adjustment Disorders  309.28 (F43.23) With mixed anxiety and depressed mood  Psychosocial / Contextual Factors: Pt recently moved to [place] from [place] and has been dealing with the culture shift. Pt has also been working on trying to get himself established here which has been challenging.  PROMIS (reviewed every 90 days):   PROMIS-10 Scores        3/1/2024     9:19 AM 3/15/2024     1:01 PM 3/29/2024     9:31 AM   PROMIS-10 Total Score w/o Sub Scores   PROMIS TOTAL - SUBSCORES 29    29 28    28 34       Referral / Collaboration:  Referral to another professional/service is not indicated at this time..    Anticipated number of session for this episode of care: 9-12 sessions  Anticipation frequency of session: Weekly  Anticipated Duration of each session: 38-52 minutes  Treatment plan will be reviewed in 90 days or when goals have been changed.       MeasurableTreatment Goal(s) related to diagnosis / functional impairment(s)  Goal 1: Patient will better manage his anger/frustration, not getting as easily aggravated, and not letting comments get to him as easily.    I will know I've met my goal when I am able to not let others get to me so much.      Objective #A (Patient Action)    Patient will identify triggers, thoughts, and current stressors which contribute to feelings of anxiety  identify patterns of escalation  (i.e. tightness in chest, flushed  face, increased heart rate, clenched hands, etc.)  identify at least 3 techniques for intervening on the escalation  use cognitive strategies identified in therapy to challenge anxious thoughts  Increase interest, engagement, and pleasure in doing things  Decrease frequency and intensity of feeling down, depressed, hopeless  Identify negative self-talk and behaviors: challenge core beliefs, myths, and actions  Improve concentration, focus, and mindfulness in daily activities   identify 3 coping strategies for improving mood  learn at least 3 self-regulation strategies.  Status: Continued - Date(s): 6/7/24    Intervention(s)  Therapist will assign homework related to target objective with the intent to promote pt autonomy and better manage MH.  Facilitate increase in self-awareness  Provide psycho-education on emotion identification/regulation and coping skills  Teach/promote utilization of mindfulness skills and grounding    Goal 2: Patient will feel worthy and be able to find validation internally.     I will know I've met my goal when able to feel more complete or worthy without needing it externally.      Objective #A (Patient Action)    Status: Continued - Date(s): 6/7/24    Patient will identify and compliment positives at least 3 times daily to support positive self-image  Decrease frequency and intensity of feeling down, depressed, hopeless  Identify negative self-talk and behaviors: challenge core beliefs, myths, and actions  identify 5 traits or charcteristics you like about yourself  identify at least 3 self-care activities.    Intervention(s)  Therapist will assign homework related to target objective with the intent to promote pt autonomy and better manage MH.  Facilitate increase in self-awareness  Provide psycho-education on self-care/compassion and narrative therapy interventions  Teach/promote utilization of reframing and boundary setting    Objective #B Being more authentic with myself and others  through improving my self-esteem.   Patient will participate in social activities to improve mood  learn & utilize at least 3 assertive communication skills weekly  identify 5 traits or charcteristics you like about yourself  identify at least 3 adaptive coping statements to counteract negative thoughts.    Status: Continued - Date(s): 6/7/24    Intervention(s)  Therapist will assign homework related to target objective with the intent to promote pt autonomy and better manage MH.  Facilitate increase in self-awareness  Provide psycho-education on self-esteem building and self-exploration  Teach/promote utilization of positive self-affirmations and critical observation skills    Patient has reviewed and agreed to the above plan.      Judd Clemons, GILBERTC  April 26, 2023

## 2024-06-13 ENCOUNTER — VIRTUAL VISIT (OUTPATIENT)
Dept: PSYCHOLOGY | Facility: CLINIC | Age: 29
End: 2024-06-13
Payer: COMMERCIAL

## 2024-06-13 DIAGNOSIS — F43.23 ADJUSTMENT DISORDER WITH MIXED ANXIETY AND DEPRESSED MOOD: Primary | ICD-10-CM

## 2024-06-13 PROCEDURE — 90837 PSYTX W PT 60 MINUTES: CPT | Mod: 95 | Performed by: COUNSELOR

## 2024-06-13 NOTE — PROGRESS NOTES
[facility]                                     Progress Note    Patient Name: Sofi Escobedo  Date: 6/13/24         Service Type: Individual      Session Start Time: 3:00 pm  Session End Time: 3:59 pm     Session Length: 59 minutes    Session #: 38    Attendees: Client attended alone    Service Modality:  Video Visit:      Provider verified identity through the following two step process.  Patient provided:  Patient is known previously to provider    Telemedicine Visit: The patient's condition can be safely assessed and treated via synchronous audio and visual telemedicine encounter.      Reason for Telemedicine Visit: Services only offered telehealth    Originating Site (Patient Location): Patient's home    Distant Site (Provider Location): Provider Remote Setting- Home Office    Consent:  The patient/guardian has verbally consented to: the potential risks and benefits of telemedicine (video visit) versus in person care; bill my insurance or make self-payment for services provided; and responsibility for payment of non-covered services.     Patient would like the video invitation sent by:  My Chart    Mode of Communication:  Video Conference via Amwell    Distant Location (Provider):  Off-site    As the provider I attest to compliance with applicable laws and regulations related to telemedicine.    DATA  Interactive Complexity: No  Crisis: No  Extended Session (53+ minutes): PROLONGED SERVICE IN THE OUTPATIENT SETTING REQUIRING DIRECT (FACE-TO-FACE) PATIENT CONTACT BEYOND THE USUAL SERVICE:    - Patient's presenting concerns require more intensive intervention than could be completed within the usual service     Progress Since Last Session (Related to Symptoms / Goals / Homework):   Symptoms: Improving pt is feeling better today and yet recognizes that he still is carrying a lot of emotional hurt    Homework: Partially completed      Episode of Care Goals: Satisfactory progress -  PREPARATION (Decided to change - considering how); Intervened by negotiating a change plan and determining options / strategies for behavior change, identifying triggers, exploring social supports, and working towards setting a date to begin behavior change     Current / Ongoing Stressors and Concerns:   Pt is currently seeking therapy due to ongoing anxiety/depression and to better manage his temper. Pt moved to [place] from [place] a few years ago and establishing a support system has been challenging due to regional cultural differences. Prior to moving to [place] pt has dealt with a lot of family drama and an unsupportive environment growing up, which pt thinks has contributed to his ongoing MH concerns. Since last session, pt reported that he had a really bad week and today is the first day he has started to feel better. Pt admitted to engaging in unhelpful coping skills to deal with his emotions and was able to see how they didn't work in the long run. Pt spoke with his ex-gilfriend after she got back and it has been made clear by her that she just wants to be friends. Pt acknowledges that he continues to engage with her because he can't let her go emotionally and is uncertain about how them not being connect anymore would impact his limited support system her, as it is all very enmeshed. Pt did try to reach out to his own family for support, and yet that didn't feel constructive in the end. Pt clarified that the feedback he got from his family, was focused on rectifying his mistakes or just ending things, which did feel like they were providing actual support. Pt reflected how he got frustrated with his dad, because he gives mixed messages and providing solutions to what he perceives as the issues not asking pt what is wrong. Pt also connect his challenges  when trying to reach out with a recent experience with his mom as well, who he felt acknowledged him but did not reciprocate in trying to connect emotionally when  he tried, which reinforced his thoughts of no one actually caring out him or his needs. Pt summarized this as everything feeling conditional, where he other only care about him as much as he can provide or reflect on them positively, and has not place he belongs or a people.community he can genuinely engage in. Pt acknowledged that after his recent experience of feeling betrayed by his support mauryem in [place] and the way his family have responded when he reached out, he doesn't feeling like he has anyone he can trust. Pt stated that he got a clear message that his ex-girlfriend that she isn't interested in him romantically, and he has continued to engage with her as friends, even if he knows he is doing this because he isn't able to let go of his feelings for her. Pt was able to agree that this may not be helpful, but for now he want to try to make it work. Pt also admitted that if he were to cut ties with her, he would still need to deal with her because her extended family is the primary support he has in [place]. Session went longer then anticipated due to presenting concerns.     Personal Goals:  Good Credit   license  House  Be with a partner that is invested in pt and is financially independent     Treatment Objective(s) Addressed in This Session:   identify and compliment positives at least 3 times daily to support positive self-image  identify triggers, thoughts, and current stressors which contribute to feelings of anxiety  identify patterns of escalation  (i.e. tightness in chest, flushed face, increased heart rate, clenched hands, etc.)  identify at least 3 techniques for intervening on the escalation  use cognitive strategies identified in therapy to challenge anxious thoughts  Decrease frequency and intensity of feeling down, depressed, hopeless  Identify negative self-talk and behaviors: challenge core beliefs, myths, and actions  identify two areas of life that you would like to have improved  functioning  identify at least 3 self-care activities     Intervention:   CBT: Reviewed recently behavior patterns and environmental factors that impact them    Motivational Interviewing    MI Intervention: Expressed Empathy/Understanding, Supported Autonomy, Collaboration, Evocation, Open-ended questions, Reflections: simple and complex, Change talk (evoked), and Reframe     Change Talk Expressed by the Patient: Desire to change Ability to change Reasons to change Need to change Committment to change Activation Taking steps    Provider Response to Change Talk: E - Evoked more info from patient about behavior change, A - Affirmed patient's thoughts, decisions, or attempts at behavior change, R - Reflected patient's change talk, and S - Summarized patient's change talk statements    Psychodynamic: Processed through internal-experiences related to interpersonal relationships  Solution Focused: Identified self-imposed barriers and how he has worked to overcome them    Assessments completed prior to visit:  PHQ2:       4/12/2024    12:27 PM 3/29/2024     9:30 AM 3/22/2024     1:01 PM 12/19/2023     9:58 AM 9/26/2023     9:48 AM 9/19/2023     9:30 AM 6/19/2023     4:01 PM   PHQ-2 ( 1999 [employer])   Q1: Little interest or pleasure in doing things 1 1 1 1 1 1 0   Q2: Feeling down, depressed or hopeless 1 1 1 1 1 1 0   PHQ-2 Score 2 2 2 2 2 2 0   Q1: Little interest or pleasure in doing things Several days Several days Several days Several days Several days Several days Not at all   Q2: Feeling down, depressed or hopeless Several days Several days Several days Several days Several days Several days Not at all   PHQ-2 Score 2 2 2 2 2 2 0     PHQ9:       4/12/2023     4:37 PM 1/11/2024     4:02 PM   PHQ-9 SCORE   PHQ-9 Total Score MyChart 5 (Mild depression) 9 (Mild depression)   PHQ-9 Total Score 5 9     GAD2:       7/18/2023     8:25 AM 10/17/2023     9:53 AM 10/30/2023    12:07 PM 12/19/2023     9:58 AM 3/20/2024    10:41 PM  3/29/2024     9:30 AM 4/5/2024     9:51 AM   BISHOP-2   Feeling nervous, anxious, or on edge 0 1 1 1 1 1 1   Not being able to stop or control worrying 0 1 1 1 1 1 1   BISHOP-2 Total Score 0    0 2    2 2 2 2 2 2        ASSESSMENT: Current Emotional / Mental Status (status of significant symptoms):   Risk status (Self / Other harm or suicidal ideation)   Patient denies current fears or concerns for personal safety.   Patient denies current or recent suicidal ideation or behaviors.   Patient denies current or recent homicidal ideation or behaviors.   Patient denies current or recent self injurious behavior or ideation.   Patient denies other safety concerns.   Patient reports there has been no change in risk factors since their last session.     Patient reports there has been no change in protective factors since their last session.     Recommended that patient call 911 or go to the local ED should there be a change in any of these risk factors.     Appearance:   Appropriate    Eye Contact:   Good    Psychomotor Behavior: Normal    Attitude:   Cooperative  Interested Friendly Pleasant   Orientation:   All   Speech    Rate / Production: Normal/ Responsive Emotional Talkative    Volume:  Normal    Mood:    Anxious  Depressed  Normal   Affect:    Appropriate  Worrisome  frustrated    Thought Content:  Clear    Thought Form:  Coherent  Logical    Insight:    Good  and Intellectual Insight     Medication Review:   No current psychiatric medications prescribed     Medication Compliance:   NA     Changes in Health Issues:   None reported     Chemical Use Review:   Substance Use: Chemical use reviewed, no active concerns identified      Tobacco Use: No change in amount of tobacco use since last session.  Provided encouragement to quit   Patient declined discussion at this time    Diagnosis:  1. Adjustment disorder with mixed anxiety and depressed mood          Collateral Reports Completed:   Not Applicable    PLAN: (Patient Tasks  "/ Therapist Tasks / Other)  Check-in with self about feelings of rejection/worthiness  Reflect on helpful vs unhelpful relationship dynamics  Use coping skills discussed in therapy  Avoid alcohol as a coping skill  Explore possible avenues to find \"your people\" or community you can connect with    LADONNA Paez                                                         ______________________________________________________________________    Individual Treatment Plan    Patient's Name: Sofi Escobedo  YOB: 1995    Date of Creation: 4/26/23  Date Treatment Plan Last Reviewed/Revised: 6/7/24    DSM5 Diagnoses: Adjustment Disorders  309.28 (F43.23) With mixed anxiety and depressed mood  Psychosocial / Contextual Factors: Pt recently moved to [place] from [place] and has been dealing with the culture shift. Pt has also been working on trying to get himself established here which has been challenging.  PROMIS (reviewed every 90 days):   PROMIS-10 Scores        3/1/2024     9:19 AM 3/15/2024     1:01 PM 3/29/2024     9:31 AM   PROMIS-10 Total Score w/o Sub Scores   PROMIS TOTAL - SUBSCORES 29    29 28    28 34       Referral / Collaboration:  Referral to another professional/service is not indicated at this time..    Anticipated number of session for this episode of care: 9-12 sessions  Anticipation frequency of session: Weekly  Anticipated Duration of each session: 38-52 minutes  Treatment plan will be reviewed in 90 days or when goals have been changed.       MeasurableTreatment Goal(s) related to diagnosis / functional impairment(s)  Goal 1: Patient will better manage his anger/frustration, not getting as easily aggravated, and not letting comments get to him as easily.    I will know I've met my goal when I am able to not let others get to me so much.      Objective #A (Patient Action)    Patient will identify triggers, thoughts, and current stressors which contribute to feelings of " anxiety  identify patterns of escalation  (i.e. tightness in chest, flushed face, increased heart rate, clenched hands, etc.)  identify at least 3 techniques for intervening on the escalation  use cognitive strategies identified in therapy to challenge anxious thoughts  Increase interest, engagement, and pleasure in doing things  Decrease frequency and intensity of feeling down, depressed, hopeless  Identify negative self-talk and behaviors: challenge core beliefs, myths, and actions  Improve concentration, focus, and mindfulness in daily activities   identify 3 coping strategies for improving mood  learn at least 3 self-regulation strategies.  Status: Continued - Date(s): 6/7/24    Intervention(s)  Therapist will assign homework related to target objective with the intent to promote pt autonomy and better manage MH.  Facilitate increase in self-awareness  Provide psycho-education on emotion identification/regulation and coping skills  Teach/promote utilization of mindfulness skills and grounding    Goal 2: Patient will feel worthy and be able to find validation internally.     I will know I've met my goal when able to feel more complete or worthy without needing it externally.      Objective #A (Patient Action)    Status: Continued - Date(s): 6/7/24    Patient will identify and compliment positives at least 3 times daily to support positive self-image  Decrease frequency and intensity of feeling down, depressed, hopeless  Identify negative self-talk and behaviors: challenge core beliefs, myths, and actions  identify 5 traits or charcteristics you like about yourself  identify at least 3 self-care activities.    Intervention(s)  Therapist will assign homework related to target objective with the intent to promote pt autonomy and better manage MH.  Facilitate increase in self-awareness  Provide psycho-education on self-care/compassion and narrative therapy interventions  Teach/promote utilization of reframing and  boundary setting    Objective #B Being more authentic with myself and others through improving my self-esteem.   Patient will participate in social activities to improve mood  learn & utilize at least 3 assertive communication skills weekly  identify 5 traits or charcteristics you like about yourself  identify at least 3 adaptive coping statements to counteract negative thoughts.    Status: Continued - Date(s): 6/7/24    Intervention(s)  Therapist will assign homework related to target objective with the intent to promote pt autonomy and better manage MH.  Facilitate increase in self-awareness  Provide psycho-education on self-esteem building and self-exploration  Teach/promote utilization of positive self-affirmations and critical observation skills    Patient has reviewed and agreed to the above plan.      Judd Clemons, GILBERTC  April 26, 2023

## 2024-07-03 ENCOUNTER — VIRTUAL VISIT (OUTPATIENT)
Dept: PSYCHOLOGY | Facility: CLINIC | Age: 29
End: 2024-07-03
Payer: COMMERCIAL

## 2024-07-03 DIAGNOSIS — F43.23 ADJUSTMENT DISORDER WITH MIXED ANXIETY AND DEPRESSED MOOD: Primary | ICD-10-CM

## 2024-07-03 PROCEDURE — 90837 PSYTX W PT 60 MINUTES: CPT | Mod: 95 | Performed by: COUNSELOR

## 2024-07-03 NOTE — PROGRESS NOTES
[facility]                                     Progress Note    Patient Name: Sofi Escobedo  Date: 7/03/24         Service Type: Individual      Session Start Time: 3:02 pm  Session End Time: 3:59 pm     Session Length: 57 minutes    Session #: 39    Attendees: Client attended alone    Service Modality:  Video Visit:      Provider verified identity through the following two step process.  Patient provided:  Patient is known previously to provider    Telemedicine Visit: The patient's condition can be safely assessed and treated via synchronous audio and visual telemedicine encounter.      Reason for Telemedicine Visit: Services only offered telehealth    Originating Site (Patient Location): Patient's home    Distant Site (Provider Location): Provider Remote Setting- Home Office    Consent:  The patient/guardian has verbally consented to: the potential risks and benefits of telemedicine (video visit) versus in person care; bill my insurance or make self-payment for services provided; and responsibility for payment of non-covered services.     Patient would like the video invitation sent by:  My Chart    Mode of Communication:  Video Conference via [employer]    Distant Location (Provider):  Off-site    As the provider I attest to compliance with applicable laws and regulations related to telemedicine.    DATA  Interactive Complexity: No  Crisis: No  Extended Session (53+ minutes): PROLONGED SERVICE IN THE OUTPATIENT SETTING REQUIRING DIRECT (FACE-TO-FACE) PATIENT CONTACT BEYOND THE USUAL SERVICE:    - Patient's presenting concerns require more intensive intervention than could be completed within the usual service     Progress Since Last Session (Related to Symptoms / Goals / Homework):   Symptoms: Improving pt is feeling better today and yet recognizes that he still is carrying a lot of emotional hurt    Homework: Partially completed      Episode of Care Goals: Satisfactory progress -  PREPARATION (Decided to change - considering how); Intervened by negotiating a change plan and determining options / strategies for behavior change, identifying triggers, exploring social supports, and working towards setting a date to begin behavior change     Current / Ongoing Stressors and Concerns:   Pt is currently seeking therapy due to ongoing anxiety/depression and to better manage his temper. Pt moved to [place] from [place] a few years ago and establishing a support system has been challenging due to regional cultural differences. Prior to moving to [place] pt has dealt with a lot of family drama and an unsupportive environment growing up, which pt thinks has contributed to his ongoing MH concerns. 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 Pt processed through drama at his work, which he trying to navigate and not dwell on. Pt reflected that his training has been less organized then was originally planned and his work schedule has changed which is causing some disruption. Pt also has been having a challenge with one of his trainers, as there is some tension around how they handle situations differently. Pt has gotten positive feedback from all his other trainers, so this situation surprised him a bit. Pt also suspects that there may be some unintentional racism/profiling going on as well, which is contributing to it or making it harder to navigate. Pt is still dealing with his ex-girlfriend situation which has been a big stressor, as it is causing him a lot of complicated emotions. His ex is commonly staying at his place, non-romantically, despite having her own place now and he feels she is sending him mixed messages. He reiterated that she only wants to be friends and she doesn't find him attractive yet still expresses loving him, when he tries to bring up where their relationship is going. Pt is feeling frustrated as he realizes he  "can't let her go emotionally and explored why that might be. Pt did connect this to his experiences in childhood and how a fear of abandonment or loss is fueling this. Pt was able to acknowledge a pattern of this in some of his past relationships and that it isn't supporting getting the kind of relationship he wants, but he still does it. Pt has noticed that when he gets upset he shuts down, and will default to saying \"okay\", which has made trying to talk about this with his ex more challenging. Based on his description is sounds like pt is stuck in this relationship and it is sucking all his emotional energy like a black hole, which he is uncertain of how to break free from. Pt has felt more emotionally stable and yet doesn't feel like he is in a good place still. Pt is also dealing with financial stressors which he is needing to address soon but recreating his budget. Session went longer then anticipated due to presenting concerns.     Personal Goals:  Good Credit   license  House  Be with a partner that is invested in pt and is financially independent     Treatment Objective(s) Addressed in This Session:   identify and compliment positives at least 3 times daily to support positive self-image  identify triggers, thoughts, and current stressors which contribute to feelings of anxiety  identify patterns of escalation  (i.e. tightness in chest, flushed face, increased heart rate, clenched hands, etc.)  identify at least 3 techniques for intervening on the escalation  use cognitive strategies identified in therapy to challenge anxious thoughts  Decrease frequency and intensity of feeling down, depressed, hopeless  Identify negative self-talk and behaviors: challenge core beliefs, myths, and actions  identify two areas of life that you would like to have improved functioning  identify at least 3 self-care activities     Intervention:   CBT: Reviewed recently behavior patterns and environmental factors " that impact them    Motivational Interviewing    MI Intervention: Expressed Empathy/Understanding, Supported Autonomy, Collaboration, Evocation, Open-ended questions, Reflections: simple and complex, Change talk (evoked), and Reframe     Change Talk Expressed by the Patient: Desire to change Ability to change Reasons to change Need to change Committment to change Activation Taking steps    Provider Response to Change Talk: E - Evoked more info from patient about behavior change, A - Affirmed patient's thoughts, decisions, or attempts at behavior change, R - Reflected patient's change talk, and S - Summarized patient's change talk statements    Psychodynamic: Processed through internal-experiences related to interpersonal relationships  Solution Focused: Identified self-imposed barriers and how he has worked to overcome them    Assessments completed prior to visit:  PHQ2:       4/12/2024    12:27 PM 3/29/2024     9:30 AM 3/22/2024     1:01 PM 12/19/2023     9:58 AM 9/26/2023     9:48 AM 9/19/2023     9:30 AM 6/19/2023     4:01 PM   PHQ-2 ( 1999 [employer])   Q1: Little interest or pleasure in doing things 1 1 1 1 1 1 0   Q2: Feeling down, depressed or hopeless 1 1 1 1 1 1 0   PHQ-2 Score 2 2 2 2 2 2 0   Q1: Little interest or pleasure in doing things Several days Several days Several days Several days Several days Several days Not at all   Q2: Feeling down, depressed or hopeless Several days Several days Several days Several days Several days Several days Not at all   PHQ-2 Score 2 2 2 2 2 2 0     PHQ9:       4/12/2023     4:37 PM 1/11/2024     4:02 PM   PHQ-9 SCORE   PHQ-9 Total Score MyChart 5 (Mild depression) 9 (Mild depression)   PHQ-9 Total Score 5 9     GAD2:       7/18/2023     8:25 AM 10/17/2023     9:53 AM 10/30/2023    12:07 PM 12/19/2023     9:58 AM 3/20/2024    10:41 PM 3/29/2024     9:30 AM 4/5/2024     9:51 AM   BISHOP-2   Feeling nervous, anxious, or on edge 0 1 1 1 1 1 1   Not being able to stop or control  worrying 0 1 1 1 1 1 1   BISHOP-2 Total Score 0    0 2    2 2 2 2 2 2        ASSESSMENT: Current Emotional / Mental Status (status of significant symptoms):   Risk status (Self / Other harm or suicidal ideation)   Patient denies current fears or concerns for personal safety.   Patient denies current or recent suicidal ideation or behaviors.   Patient denies current or recent homicidal ideation or behaviors.   Patient denies current or recent self injurious behavior or ideation.   Patient denies other safety concerns.   Patient reports there has been no change in risk factors since their last session.     Patient reports there has been no change in protective factors since their last session.     Recommended that patient call 911 or go to the local ED should there be a change in any of these risk factors.     Appearance:   Appropriate    Eye Contact:   Good    Psychomotor Behavior: Normal    Attitude:   Cooperative  Interested Friendly Pleasant   Orientation:   All   Speech    Rate / Production: Normal/ Responsive Emotional Talkative    Volume:  Normal    Mood:    Anxious  Depressed  Normal   Affect:    Appropriate  Worrisome  frustrated    Thought Content:  Clear    Thought Form:  Coherent  Logical    Insight:    Good  and Intellectual Insight     Medication Review:   No current psychiatric medications prescribed     Medication Compliance:   NA     Changes in Health Issues:   None reported     Chemical Use Review:   Substance Use: Chemical use reviewed, no active concerns identified      Tobacco Use: No change in amount of tobacco use since last session.  Provided encouragement to quit   Patient declined discussion at this time    Diagnosis:  1. Adjustment disorder with mixed anxiety and depressed mood        Collateral Reports Completed:   Not Applicable    PLAN: (Patient Tasks / Therapist Tasks / Other)  Write a letter to your younger self about the conditional love you experienced growing up and how that is now. (Be  "kind to your younger yourself)  Ask why you are still wanting to be with Summer, without using \"I love her\" or \"there is no one else like her\" as a reason.  Reflect on how your current relationship dynamics are helpful vs unhelpful when it comes to your life goals  Meet with friend to re-develop a budget  Try to finish letter to your grand-father (extra credit)  Follow-up with friend about redoing budget    Judd Clemons, [facility]                                                         ______________________________________________________________________    Individual Treatment Plan    Patient's Name: Sofi Escobedo  YOB: 1995    Date of Creation: 4/26/23  Date Treatment Plan Last Reviewed/Revised: 6/7/24    DSM5 Diagnoses: Adjustment Disorders  309.28 (F43.23) With mixed anxiety and depressed mood  Psychosocial / Contextual Factors: Pt recently moved to [place] from [place] and has been dealing with the culture shift. Pt has also been working on trying to get himself established here which has been challenging.  PROMIS (reviewed every 90 days):   PROMIS-10 Scores        3/15/2024     1:01 PM 3/29/2024     9:31 AM 7/3/2024     2:41 PM   PROMIS-10 Total Score w/o Sub Scores   PROMIS TOTAL - SUBSCORES 28    28 34 26       Referral / Collaboration:  Referral to another professional/service is not indicated at this time..    Anticipated number of session for this episode of care: 9-12 sessions  Anticipation frequency of session: Weekly  Anticipated Duration of each session: 38-52 minutes  Treatment plan will be reviewed in 90 days or when goals have been changed.       MeasurableTreatment Goal(s) related to diagnosis / functional impairment(s)  Goal 1: Patient will better manage his anger/frustration, not getting as easily aggravated, and not letting comments get to him as easily.    I will know I've met my goal when I am able to not let others get to me so much.      Objective #A (Patient " Action)    Patient will identify triggers, thoughts, and current stressors which contribute to feelings of anxiety  identify patterns of escalation  (i.e. tightness in chest, flushed face, increased heart rate, clenched hands, etc.)  identify at least 3 techniques for intervening on the escalation  use cognitive strategies identified in therapy to challenge anxious thoughts  Increase interest, engagement, and pleasure in doing things  Decrease frequency and intensity of feeling down, depressed, hopeless  Identify negative self-talk and behaviors: challenge core beliefs, myths, and actions  Improve concentration, focus, and mindfulness in daily activities   identify 3 coping strategies for improving mood  learn at least 3 self-regulation strategies.  Status: Continued - Date(s): 6/7/24    Intervention(s)  Therapist will assign homework related to target objective with the intent to promote pt autonomy and better manage MH.  Facilitate increase in self-awareness  Provide psycho-education on emotion identification/regulation and coping skills  Teach/promote utilization of mindfulness skills and grounding    Goal 2: Patient will feel worthy and be able to find validation internally.     I will know I've met my goal when able to feel more complete or worthy without needing it externally.      Objective #A (Patient Action)    Status: Continued - Date(s): 6/7/24    Patient will identify and compliment positives at least 3 times daily to support positive self-image  Decrease frequency and intensity of feeling down, depressed, hopeless  Identify negative self-talk and behaviors: challenge core beliefs, myths, and actions  identify 5 traits or charcteristics you like about yourself  identify at least 3 self-care activities.    Intervention(s)  Therapist will assign homework related to target objective with the intent to promote pt autonomy and better manage MH.  Facilitate increase in self-awareness  Provide psycho-education  on self-care/compassion and narrative therapy interventions  Teach/promote utilization of reframing and boundary setting    Objective #B Being more authentic with myself and others through improving my self-esteem.   Patient will participate in social activities to improve mood  learn & utilize at least 3 assertive communication skills weekly  identify 5 traits or charcteristics you like about yourself  identify at least 3 adaptive coping statements to counteract negative thoughts.    Status: Continued - Date(s): 6/7/24    Intervention(s)  Therapist will assign homework related to target objective with the intent to promote pt autonomy and better manage MH.  Facilitate increase in self-awareness  Provide psycho-education on self-esteem building and self-exploration  Teach/promote utilization of positive self-affirmations and critical observation skills    Patient has reviewed and agreed to the above plan.      LADONNA Paez  April 26, 2023

## 2024-07-16 ENCOUNTER — VIRTUAL VISIT (OUTPATIENT)
Dept: PSYCHOLOGY | Facility: CLINIC | Age: 29
End: 2024-07-16
Payer: COMMERCIAL

## 2024-07-16 DIAGNOSIS — F43.23 ADJUSTMENT DISORDER WITH MIXED ANXIETY AND DEPRESSED MOOD: Primary | ICD-10-CM

## 2024-07-16 PROCEDURE — 90837 PSYTX W PT 60 MINUTES: CPT | Mod: 93 | Performed by: COUNSELOR
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
## 2024-07-17 ENCOUNTER — VIRTUAL VISIT (OUTPATIENT)
Dept: PSYCHOLOGY | Facility: CLINIC | Age: 29
End: 2024-07-17
Payer: COMMERCIAL

## 2024-07-17 DIAGNOSIS — F43.23 ADJUSTMENT DISORDER WITH MIXED ANXIETY AND DEPRESSED MOOD: Primary | ICD-10-CM

## 2024-07-17 PROCEDURE — 90837 PSYTX W PT 60 MINUTES: CPT | Mod: 95 | Performed by: COUNSELOR

## 2024-07-17 ASSESSMENT — ANXIETY QUESTIONNAIRES
5. BEING SO RESTLESS THAT IT IS HARD TO SIT STILL: NEARLY EVERY DAY
8. IF YOU CHECKED OFF ANY PROBLEMS, HOW DIFFICULT HAVE THESE MADE IT FOR YOU TO DO YOUR WORK, TAKE CARE OF THINGS AT HOME, OR GET ALONG WITH OTHER PEOPLE?: SOMEWHAT DIFFICULT
4. TROUBLE RELAXING: NEARLY EVERY DAY
IF YOU CHECKED OFF ANY PROBLEMS ON THIS QUESTIONNAIRE, HOW DIFFICULT HAVE THESE PROBLEMS MADE IT FOR YOU TO DO YOUR WORK, TAKE CARE OF THINGS AT HOME, OR GET ALONG WITH OTHER PEOPLE: SOMEWHAT DIFFICULT
GAD7 TOTAL SCORE: 18
2. NOT BEING ABLE TO STOP OR CONTROL WORRYING: NEARLY EVERY DAY
7. FEELING AFRAID AS IF SOMETHING AWFUL MIGHT HAPPEN: MORE THAN HALF THE DAYS
3. WORRYING TOO MUCH ABOUT DIFFERENT THINGS: NEARLY EVERY DAY
1. FEELING NERVOUS, ANXIOUS, OR ON EDGE: NEARLY EVERY DAY
6. BECOMING EASILY ANNOYED OR IRRITABLE: SEVERAL DAYS
GAD7 TOTAL SCORE: 18
7. FEELING AFRAID AS IF SOMETHING AWFUL MIGHT HAPPEN: MORE THAN HALF THE DAYS
GAD7 TOTAL SCORE: 18
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
## 2024-07-31 ENCOUNTER — VIRTUAL VISIT (OUTPATIENT)
Dept: PSYCHOLOGY | Facility: CLINIC | Age: 29
End: 2024-07-31
Payer: COMMERCIAL

## 2024-07-31 DIAGNOSIS — F43.23 ADJUSTMENT DISORDER WITH MIXED ANXIETY AND DEPRESSED MOOD: Primary | ICD-10-CM

## 2024-07-31 PROCEDURE — 90837 PSYTX W PT 60 MINUTES: CPT | Mod: 95 | Performed by: COUNSELOR

## 2024-07-31 NOTE — PROGRESS NOTES
[facility]                                     Progress Note    Patient Name: Sofi Escobedo  Date: 7/31/24         Service Type: Individual      Session Start Time: 3:01 pm  Session End Time: 3:59 pm     Session Length: 58 minutes    Session #: 42    Attendees: Client attended alone    Service Modality:  Video Visit:      Provider verified identity through the following two step process.  Patient provided:  Patient is known previously to provider    Telemedicine Visit: The patient's condition can be safely assessed and treated via synchronous audio and visual telemedicine encounter.      Reason for Telemedicine Visit: Services only offered telehealth    Originating Site (Patient Location): Patient's home    Distant Site (Provider Location): Provider Remote Setting- Home Office    Consent:  The patient/guardian has verbally consented to: the potential risks and benefits of telemedicine (video visit) versus in person care; bill my insurance or make self-payment for services provided; and responsibility for payment of non-covered services.     Patient would like the video invitation sent by:  My Chart    Mode of Communication:  Video Conference via [employer]well    Distant Location (Provider):  Off-site    As the provider I attest to compliance with applicable laws and regulations related to telemedicine.    DATA  Interactive Complexity: No  Crisis: No  Extended Session (53+ minutes): PROLONGED SERVICE IN THE OUTPATIENT SETTING REQUIRING DIRECT (FACE-TO-FACE) PATIENT CONTACT BEYOND THE USUAL SERVICE:    - Patient's presenting concerns require more intensive intervention than could be completed within the usual service     Progress Since Last Session (Related to Symptoms / Goals / Homework):   Symptoms: Improving pt is feeling better    Homework: Achieved / completed to satisfaction      Episode of Care Goals: Satisfactory progress - ACTION (Actively working towards change); Intervened by  reinforcing change plan / affirming steps taken     Current / Ongoing Stressors and Concerns:  Next Session: uncertainty about the future and letting things go in relation to anxiety attacks   Pt is currently seeking therapy due to ongoing anxiety/depression and to better manage his temper. Pt moved to [place] from [place] a few years ago and establishing a support system has been challenging due to regional cultural differences. Prior to moving to [place] pt has dealt with a lot of family drama and an unsupportive environment growing up, which pt is no coming to see feeds into abandonment/attachment issues and relationship patterns. Since last session, pt has been overworking himself both at the gym and picking up extra shifts, which has been exhausting. Pt has been doing this to distract himself from everything that he had been going through and keep his mind off of unpleasant thoughts. Pt endorses having anxiety attacks and finds that these are disrupting his sleep at times; based on pt's comments this may be rooted in uncertainty about the future. Pt did drop his ex's stuff off at her place and when he tried to talk more with her she behaved like something out of a movie, which felt confusing. However, in the end his ex reinforced to him that she is done with their relationship. Pt hasn't talked to his ex in about a week and a half at this point. Pt's ex's family has continued to engage with him though and pt has been trying to put some rails on interacting with them. Pt is going to test the osborne by attending a family event he was invited to, while also giving his ex her space, and if that feels weird he is going to stop attending family events. Pt is trying to take space and has deleted his dating apps in an effort to focus on himself, which he feels is working. Pt talked with his bio-mom recently, during which he expressed his feelings about how her behavior as a parent impacted him. Unfortunately, she dismissed  "this and argued with him about his recollection of events, which both stung and felt invalidating, as it was clear to him that her timeline of events doesn't make sense and thinks she was a good parent. Pt discussed how he struggles with understanding how people can stop caring about others and if they say they don't care, he questions if they ever cared in the first place. This was explored further with pt by discussing how he has stopped caring about places and things in the past. This resonated with pt and gave him a framework to think about this differently, which made sense to him. Pt has been dealing with some work stress as well, but has manager that appears to have his back, which has been a big help and validating. Pt briefly talked about the anxiety attacks he has been feeling and they appear to be trigger by thoughts about how to fix his situation with his ex. Pt has been focusing on what he can and can't control, reminding himself to let things be and that there is nothing for him to \"fix\". Based on what pt has indicated it would be worth exploring the role uncertainty and unresolved feeling related to being alone may play in these anxiety attacks. Session went longer then anticipated due to presenting concerns needing to be addressed.     Personal Goals:  Good Credit   license  House  Be with a partner that is invested in pt and is financially independent     Treatment Objective(s) Addressed in This Session:   identify and compliment positives at least 3 times daily to support positive self-image  identify triggers, thoughts, and current stressors which contribute to feelings of anxiety  identify patterns of escalation  (i.e. tightness in chest, flushed face, increased heart rate, clenched hands, etc.)  identify at least 3 techniques for intervening on the escalation  use cognitive strategies identified in therapy to challenge anxious thoughts  Decrease frequency and intensity of feeling " down, depressed, hopeless  Identify negative self-talk and behaviors: challenge core beliefs, myths, and actions  identify two areas of life that you would like to have improved functioning  identify at least 3 self-care activities     Intervention:   CBT: Reviewed recently behavior patterns and environmental factors that impact them    Motivational Interviewing    MI Intervention: Expressed Empathy/Understanding, Supported Autonomy, Collaboration, Evocation, Open-ended questions, Reflections: simple and complex, Change talk (evoked), and Reframe     Change Talk Expressed by the Patient: Desire to change Ability to change Reasons to change Need to change Committment to change Activation Taking steps    Provider Response to Change Talk: E - Evoked more info from patient about behavior change, A - Affirmed patient's thoughts, decisions, or attempts at behavior change, R - Reflected patient's change talk, and S - Summarized patient's change talk statements    Psychodynamic: Processed through internal-experiences related to interpersonal relationships  Solution Focused: Identified self-imposed barriers and how he has worked to overcome them    Assessments completed prior to visit:  PHQ2:       7/31/2024     1:01 PM 7/17/2024     1:45 PM 4/12/2024    12:27 PM 3/29/2024     9:30 AM 3/22/2024     1:01 PM 12/19/2023     9:58 AM 9/26/2023     9:48 AM   PHQ-2 ( 1999 [employer])   Q1: Little interest or pleasure in doing things 1 1 1 1 1 1 1   Q2: Feeling down, depressed or hopeless 1 1 1 1 1 1 1   PHQ-2 Score 2 2 2 2 2 2 2   Q1: Little interest or pleasure in doing things Several days Several days Several days Several days Several days Several days Several days   Q2: Feeling down, depressed or hopeless Several days Several days Several days Several days Several days Several days Several days   PHQ-2 Score 2 2 2 2 2 2 2     PHQ9:       4/12/2023     4:37 PM 1/11/2024     4:02 PM   PHQ-9 SCORE   PHQ-9 Total Score MyChart 5 (Mild  depression) 9 (Mild depression)   PHQ-9 Total Score 5 9     GAD2:       10/30/2023    12:07 PM 12/19/2023     9:58 AM 3/20/2024    10:41 PM 3/29/2024     9:30 AM 4/5/2024     9:51 AM 7/17/2024     1:46 PM 7/31/2024     1:01 PM   BISHOP-2   Feeling nervous, anxious, or on edge 1 1 1 1 1 3 1   Not being able to stop or control worrying 1 1 1 1 1 3 1   BISHOP-2 Total Score 2 2 2 2 2 6 2        ASSESSMENT: Current Emotional / Mental Status (status of significant symptoms):   Risk status (Self / Other harm or suicidal ideation)   Patient denies current fears or concerns for personal safety.   Patient denies current or recent suicidal ideation or behaviors.   Patient denies current or recent homicidal ideation or behaviors.   Patient denies current or recent self injurious behavior or ideation.   Patient denies other safety concerns.   Patient reports there has been no change in risk factors since their last session.     Patient reports there has been no change in protective factors since their last session.     Recommended that patient call 911 or go to the local ED should there be a change in any of these risk factors.     Appearance:   Appropriate    Eye Contact:   Good    Psychomotor Behavior: Normal    Attitude:   Cooperative  Interested Friendly Pleasant   Orientation:   All   Speech    Rate / Production: Normal/ Responsive Talkative    Volume:  Normal    Mood:    Anxious  Normal   Affect:    Appropriate    Thought Content:  Clear    Thought Form:  Coherent  Logical    Insight:    Fair , External locus, and Intellectual Insight     Medication Review:   No current psychiatric medications prescribed     Medication Compliance:   NA     Changes in Health Issues:   None reported     Chemical Use Review:   Substance Use: Chemical use reviewed, no active concerns identified      Tobacco Use: No change in amount of tobacco use since last session.  Provided encouragement to quit   Patient declined discussion at this  time    Diagnosis:  1. Adjustment disorder with mixed anxiety and depressed mood        Collateral Reports Completed:   Not Applicable    PLAN: (Patient Tasks / Therapist Tasks / Other)  Continue to stay off of dating apps  Attempt to maintain boundaries and give ex space  Keep focusing on self and try to give self more time to rest/sleep  Consider framework related to caring for others as being impermanent sometimes    Judd Clemons, [facility]                                                         ______________________________________________________________________    Individual Treatment Plan    Patient's Name: Sofi Escobedo  YOB: 1995    Date of Creation: 4/26/23  Date Treatment Plan Last Reviewed/Revised: 6/7/24    DSM5 Diagnoses: Adjustment Disorders  309.28 (F43.23) With mixed anxiety and depressed mood  Psychosocial / Contextual Factors: Pt recently moved to [place] from [place] and has been dealing with the culture shift. Pt has also been working on trying to get himself established here which has been challenging.  PROMIS (reviewed every 90 days):   PROMIS-10 Scores        7/3/2024     2:41 PM 7/17/2024     1:47 PM 7/31/2024     1:02 PM   PROMIS-10 Total Score w/o Sub Scores   PROMIS TOTAL - SUBSCORES 26 25 26       Referral / Collaboration:  Referral to another professional/service is not indicated at this time..    Anticipated number of session for this episode of care: 9-12 sessions  Anticipation frequency of session: Weekly  Anticipated Duration of each session: 38-52 minutes  Treatment plan will be reviewed in 90 days or when goals have been changed.       MeasurableTreatment Goal(s) related to diagnosis / functional impairment(s)  Goal 1: Patient will better manage his anger/frustration, not getting as easily aggravated, and not letting comments get to him as easily.    I will know I've met my goal when I am able to not let others get to me so much.      Objective #A (Patient  Action)    Patient will identify triggers, thoughts, and current stressors which contribute to feelings of anxiety  identify patterns of escalation  (i.e. tightness in chest, flushed face, increased heart rate, clenched hands, etc.)  identify at least 3 techniques for intervening on the escalation  use cognitive strategies identified in therapy to challenge anxious thoughts  Increase interest, engagement, and pleasure in doing things  Decrease frequency and intensity of feeling down, depressed, hopeless  Identify negative self-talk and behaviors: challenge core beliefs, myths, and actions  Improve concentration, focus, and mindfulness in daily activities   identify 3 coping strategies for improving mood  learn at least 3 self-regulation strategies.  Status: Continued - Date(s): 6/7/24    Intervention(s)  Therapist will assign homework related to target objective with the intent to promote pt autonomy and better manage MH.  Facilitate increase in self-awareness  Provide psycho-education on emotion identification/regulation and coping skills  Teach/promote utilization of mindfulness skills and grounding    Goal 2: Patient will feel worthy and be able to find validation internally.     I will know I've met my goal when able to feel more complete or worthy without needing it externally.      Objective #A (Patient Action)    Status: Continued - Date(s): 6/7/24    Patient will identify and compliment positives at least 3 times daily to support positive self-image  Decrease frequency and intensity of feeling down, depressed, hopeless  Identify negative self-talk and behaviors: challenge core beliefs, myths, and actions  identify 5 traits or charcteristics you like about yourself  identify at least 3 self-care activities.    Intervention(s)  Therapist will assign homework related to target objective with the intent to promote pt autonomy and better manage MH.  Facilitate increase in self-awareness  Provide psycho-education  on self-care/compassion and narrative therapy interventions  Teach/promote utilization of reframing and boundary setting    Objective #B Being more authentic with myself and others through improving my self-esteem.   Patient will participate in social activities to improve mood  learn & utilize at least 3 assertive communication skills weekly  identify 5 traits or charcteristics you like about yourself  identify at least 3 adaptive coping statements to counteract negative thoughts.    Status: Continued - Date(s): 6/7/24    Intervention(s)  Therapist will assign homework related to target objective with the intent to promote pt autonomy and better manage MH.  Facilitate increase in self-awareness  Provide psycho-education on self-esteem building and self-exploration  Teach/promote utilization of positive self-affirmations and critical observation skills    Patient has reviewed and agreed to the above plan.      LADONNA Paez  April 26, 2023

## 2024-08-07 ENCOUNTER — DOCUMENTATION ONLY (OUTPATIENT)
Dept: BEHAVIORAL HEALTH | Facility: HOSPITAL | Age: 29
End: 2024-08-07
Payer: COMMERCIAL

## 2024-08-07 NOTE — PROGRESS NOTES
Therapist (writer) entered the virtual session at the scheduled time of the appointment. Pt did not arrive on time and therapist called pt 10 minutes into the appointment, leaving a VM prompting them to join via [employer] or call the office or reach out through [employer] if they were having an issue. Pt was informed of the date/time of their next follow-up appointment and was encouraged to attend if they were unable to make today's appointment and if they couldn't make it, to call 24 hours in advance to cancel it. Therapist waited an addition 10-15 minutes for pt to join and when they did not the session was cancelled.

## 2024-08-14 ENCOUNTER — VIRTUAL VISIT (OUTPATIENT)
Dept: PSYCHOLOGY | Facility: CLINIC | Age: 29
End: 2024-08-14
Payer: COMMERCIAL

## 2024-08-14 DIAGNOSIS — F43.23 ADJUSTMENT DISORDER WITH MIXED ANXIETY AND DEPRESSED MOOD: Primary | ICD-10-CM

## 2024-08-14 PROCEDURE — 90837 PSYTX W PT 60 MINUTES: CPT | Mod: 95 | Performed by: COUNSELOR

## 2024-08-14 NOTE — PROGRESS NOTES
[facility]                                     Progress Note    Patient Name: Sofi Escobedo  Date: 8/14/24         Service Type: Individual      Session Start Time: 3:00 pm  Session End Time: 3:59 pm     Session Length: 59 minutes    Session #: 43    Attendees: Client attended alone    Service Modality:  Video Visit:      Provider verified identity through the following two step process.  Patient provided:  Patient is known previously to provider    Telemedicine Visit: The patient's condition can be safely assessed and treated via synchronous audio and visual telemedicine encounter.      Reason for Telemedicine Visit: Services only offered telehealth    Originating Site (Patient Location): Patient's home    Distant Site (Provider Location): Provider Remote Setting- Home Office    Consent:  The patient/guardian has verbally consented to: the potential risks and benefits of telemedicine (video visit) versus in person care; bill my insurance or make self-payment for services provided; and responsibility for payment of non-covered services.     Patient would like the video invitation sent by:  My Chart    Mode of Communication:  Video Conference via [employer]well    Distant Location (Provider):  Off-site    As the provider I attest to compliance with applicable laws and regulations related to telemedicine.    DATA  Interactive Complexity: No  Crisis: No  Extended Session (53+ minutes): PROLONGED SERVICE IN THE OUTPATIENT SETTING REQUIRING DIRECT (FACE-TO-FACE) PATIENT CONTACT BEYOND THE USUAL SERVICE:    - Patient's presenting concerns require more intensive intervention than could be completed within the usual service     Progress Since Last Session (Related to Symptoms / Goals / Homework):   Symptoms: No change pt has  maintained gains    Homework: Achieved / completed to satisfaction      Episode of Care Goals: Satisfactory progress - PREPARATION (Decided to change - considering how);  "Intervened by negotiating a change plan and determining options / strategies for behavior change, identifying triggers, exploring social supports, and working towards setting a date to begin behavior change     Current / Ongoing Stressors and Concerns:   Pt is currently seeking therapy due to ongoing anxiety/depression and to better manage his temper. Pt moved to [place] from [place] a few years ago and establishing a support system has been challenging due to regional cultural differences. Prior to moving to [place] pt has dealt with a lot of family drama and an unsupportive environment growing up, which pt is no coming to see feeds into abandonment/attachment issues and relationship patterns. Since last session, pt attended his ex's grandmother's birthday party. Pt did have an interaction with his ex, where she reiterated that they were done, and he did manage to stay at the birthday party without issue. Pt ended up talking to his ex again afterward, where she accepted his apology but also said that they couldn't be connected at all. Pt is now struggling to come to terms with his relationship with his ex ending. Pt is feeling more isolated since this and internally is still finding it hard to accept. Pt agreed that he has a deep concern about being alone and the uncertainty of what the future looks like if he is alone, is a big barrier to him being able to let his past relationships in general go. Pt reflected on how he wouldn't want to be in a relationship with his ex as she is now, as she doesn't feel like the same person he dated initially. Furthermore, pt could see how he is treating his current potential romantic interest in similar ways as his ex did to him when it came to being emotionally unavailable, verbalizing recognizing that he is a \"shity person to date\" as he gets to wrapped up in himself. Pt discussed how he can work towards being the kind of person he wants to be in  a relationship and not the \"bad\" " version he feels he is now. Pt acknowledged that he has gotten somewhat better as he has gotten older and yet can tell that fundamentally he still falls back into unhelpful patterns. Pt did do his budget, which showed him he needs to work on his spending discipline, and it felt good to do it. Pt also continued to go to the gym, which has been a good form of self-care for him. Pt has been having some car issues, which has been an extra stressor, though it has been manageable. Session went longer then anticipated due to presenting concerns needing to be addressed.     Personal Goals:  Good Credit   license  House  Be with a partner that is invested in pt and is financially independent     Treatment Objective(s) Addressed in This Session:   identify and compliment positives at least 3 times daily to support positive self-image  identify triggers, thoughts, and current stressors which contribute to feelings of anxiety  identify patterns of escalation  (i.e. tightness in chest, flushed face, increased heart rate, clenched hands, etc.)  identify at least 3 techniques for intervening on the escalation  use cognitive strategies identified in therapy to challenge anxious thoughts  Decrease frequency and intensity of feeling down, depressed, hopeless  Identify negative self-talk and behaviors: challenge core beliefs, myths, and actions  identify two areas of life that you would like to have improved functioning  identify at least 3 self-care activities     Intervention:   CBT: Reviewed recently behavior patterns and environmental factors that impact them    Motivational Interviewing    MI Intervention: Expressed Empathy/Understanding, Supported Autonomy, Collaboration, Evocation, Open-ended questions, Reflections: simple and complex, Change talk (evoked), and Reframe     Change Talk Expressed by the Patient: Desire to change Ability to change Reasons to change Need to change Committment to change Activation  Taking steps    Provider Response to Change Talk: E - Evoked more info from patient about behavior change, A - Affirmed patient's thoughts, decisions, or attempts at behavior change, R - Reflected patient's change talk, and S - Summarized patient's change talk statements    Psychodynamic: Processed through internal-experiences related to interpersonal relationships  Solution Focused: Identified self-imposed barriers and how he has worked to overcome them    Assessments completed prior to visit:  PHQ2:       7/31/2024     1:01 PM 7/17/2024     1:45 PM 4/12/2024    12:27 PM 3/29/2024     9:30 AM 3/22/2024     1:01 PM 12/19/2023     9:58 AM 9/26/2023     9:48 AM   PHQ-2 ( 1999 [employer])   Q1: Little interest or pleasure in doing things 1 1 1 1 1 1 1   Q2: Feeling down, depressed or hopeless 1 1 1 1 1 1 1   PHQ-2 Score 2 2 2 2 2 2 2   Q1: Little interest or pleasure in doing things Several days Several days Several days Several days Several days Several days Several days   Q2: Feeling down, depressed or hopeless Several days Several days Several days Several days Several days Several days Several days   PHQ-2 Score 2 2 2 2 2 2 2     PHQ9:       4/12/2023     4:37 PM 1/11/2024     4:02 PM   PHQ-9 SCORE   PHQ-9 Total Score MyChart 5 (Mild depression) 9 (Mild depression)   PHQ-9 Total Score 5 9     GAD2:       10/30/2023    12:07 PM 12/19/2023     9:58 AM 3/20/2024    10:41 PM 3/29/2024     9:30 AM 4/5/2024     9:51 AM 7/17/2024     1:46 PM 7/31/2024     1:01 PM   BISHOP-2   Feeling nervous, anxious, or on edge 1 1 1 1 1 3 1   Not being able to stop or control worrying 1 1 1 1 1 3 1   BISHOP-2 Total Score 2 2 2 2 2 6 2        ASSESSMENT: Current Emotional / Mental Status (status of significant symptoms):   Risk status (Self / Other harm or suicidal ideation)   Patient denies current fears or concerns for personal safety.   Patient denies current or recent suicidal ideation or behaviors.   Patient denies current or recent homicidal  ideation or behaviors.   Patient denies current or recent self injurious behavior or ideation.   Patient denies other safety concerns.   Patient reports there has been no change in risk factors since their last session.     Patient reports there has been no change in protective factors since their last session.     Recommended that patient call 911 or go to the local ED should there be a change in any of these risk factors.     Appearance:   Appropriate    Eye Contact:   Good    Psychomotor Behavior: Normal    Attitude:   Cooperative  Interested Friendly Pleasant   Orientation:   All   Speech    Rate / Production: Normal/ Responsive Talkative    Volume:  Normal    Mood:    Anxious  Normal Sad    Affect:    Appropriate  Worrisome    Thought Content:  Clear    Thought Form:  Coherent  Logical    Insight:    Good  and Intellectual Insight     Medication Review:   No current psychiatric medications prescribed     Medication Compliance:   NA     Changes in Health Issues:   None reported     Chemical Use Review:   Substance Use: Chemical use reviewed, no active concerns identified      Tobacco Use: No change in amount of tobacco use since last session.  Provided encouragement to quit   Patient declined discussion at this time    Diagnosis:  1. Adjustment disorder with mixed anxiety and depressed mood      Collateral Reports Completed:   Not Applicable    PLAN: (Patient Tasks / Therapist Tasks / Other)  Follow-up on budget and changes in spending   Try to accept what your ex says and not assume how she feels/thinks  Keep focusing on self and try to give self more time to rest/sleep and continue to use coping skills  Keep considering framework related to caring for others as being impermanent sometimes  Attend friend's kids b-day party  Think about current relationship and the purpose it serves in your life now    Judd [place] [facility]                                                          ______________________________________________________________________    Individual Treatment Plan    Patient's Name: Sofi Escobedo  YOB: 1995    Date of Creation: 4/26/23  Date Treatment Plan Last Reviewed/Revised: 6/7/24    DSM5 Diagnoses: Adjustment Disorders  309.28 (F43.23) With mixed anxiety and depressed mood  Psychosocial / Contextual Factors: Pt recently moved to [place] from [place] and has been dealing with the culture shift. Pt has also been working on trying to get himself established here which has been challenging.  PROMIS (reviewed every 90 days):   PROMIS-10 Scores        7/3/2024     2:41 PM 7/17/2024     1:47 PM 7/31/2024     1:02 PM   PROMIS-10 Total Score w/o Sub Scores   PROMIS TOTAL - SUBSCORES 26 25 26       Referral / Collaboration:  Referral to another professional/service is not indicated at this time..    Anticipated number of session for this episode of care: 9-12 sessions  Anticipation frequency of session: Weekly  Anticipated Duration of each session: 38-52 minutes  Treatment plan will be reviewed in 90 days or when goals have been changed.       MeasurableTreatment Goal(s) related to diagnosis / functional impairment(s)  Goal 1: Patient will better manage his anger/frustration, not getting as easily aggravated, and not letting comments get to him as easily.    I will know I've met my goal when I am able to not let others get to me so much.      Objective #A (Patient Action)    Patient will identify triggers, thoughts, and current stressors which contribute to feelings of anxiety  identify patterns of escalation  (i.e. tightness in chest, flushed face, increased heart rate, clenched hands, etc.)  identify at least 3 techniques for intervening on the escalation  use cognitive strategies identified in therapy to challenge anxious thoughts  Increase interest, engagement, and pleasure in doing things  Decrease frequency and intensity of feeling down, depressed,  hopeless  Identify negative self-talk and behaviors: challenge core beliefs, myths, and actions  Improve concentration, focus, and mindfulness in daily activities   identify 3 coping strategies for improving mood  learn at least 3 self-regulation strategies.  Status: Continued - Date(s): 6/7/24    Intervention(s)  Therapist will assign homework related to target objective with the intent to promote pt autonomy and better manage MH.  Facilitate increase in self-awareness  Provide psycho-education on emotion identification/regulation and coping skills  Teach/promote utilization of mindfulness skills and grounding    Goal 2: Patient will feel worthy and be able to find validation internally.     I will know I've met my goal when able to feel more complete or worthy without needing it externally.      Objective #A (Patient Action)    Status: Continued - Date(s): 6/7/24    Patient will identify and compliment positives at least 3 times daily to support positive self-image  Decrease frequency and intensity of feeling down, depressed, hopeless  Identify negative self-talk and behaviors: challenge core beliefs, myths, and actions  identify 5 traits or charcteristics you like about yourself  identify at least 3 self-care activities.    Intervention(s)  Therapist will assign homework related to target objective with the intent to promote pt autonomy and better manage MH.  Facilitate increase in self-awareness  Provide psycho-education on self-care/compassion and narrative therapy interventions  Teach/promote utilization of reframing and boundary setting    Objective #B Being more authentic with myself and others through improving my self-esteem.   Patient will participate in social activities to improve mood  learn & utilize at least 3 assertive communication skills weekly  identify 5 traits or charcteristics you like about yourself  identify at least 3 adaptive coping statements to counteract negative thoughts.    Status:  Continued - Date(s): 6/7/24    Intervention(s)  Therapist will assign homework related to target objective with the intent to promote pt autonomy and better manage MH.  Facilitate increase in self-awareness  Provide psycho-education on self-esteem building and self-exploration  Teach/promote utilization of positive self-affirmations and critical observation skills    Patient has reviewed and agreed to the above plan.      Judd [place], [facility]  April 26, 2023

## 2024-08-20 ENCOUNTER — LAB (OUTPATIENT)
Dept: LAB | Facility: CLINIC | Age: 29
End: 2024-08-20
Payer: COMMERCIAL

## 2024-08-20 DIAGNOSIS — Z11.3 ROUTINE SCREENING FOR STI (SEXUALLY TRANSMITTED INFECTION): ICD-10-CM

## 2024-08-20 LAB
HIV 1+2 AB+HIV1 P24 AG SERPL QL IA: NONREACTIVE
T PALLIDUM AB SER QL: NONREACTIVE

## 2024-08-20 PROCEDURE — 87591 N.GONORRHOEAE DNA AMP PROB: CPT

## 2024-08-20 PROCEDURE — 36415 COLL VENOUS BLD VENIPUNCTURE: CPT

## 2024-08-20 PROCEDURE — 87491 CHLMYD TRACH DNA AMP PROBE: CPT

## 2024-08-20 PROCEDURE — 87389 HIV-1 AG W/HIV-1&-2 AB AG IA: CPT

## 2024-08-20 PROCEDURE — 86780 TREPONEMA PALLIDUM: CPT

## 2024-08-21 ENCOUNTER — VIRTUAL VISIT (OUTPATIENT)
Dept: PSYCHOLOGY | Facility: CLINIC | Age: 29
End: 2024-08-21
Payer: COMMERCIAL

## 2024-08-21 DIAGNOSIS — F43.23 ADJUSTMENT DISORDER WITH MIXED ANXIETY AND DEPRESSED MOOD: Primary | ICD-10-CM

## 2024-08-21 LAB
C TRACH DNA SPEC QL PROBE+SIG AMP: NEGATIVE
N GONORRHOEA DNA SPEC QL NAA+PROBE: NEGATIVE

## 2024-08-21 PROCEDURE — 90837 PSYTX W PT 60 MINUTES: CPT | Mod: 95 | Performed by: COUNSELOR

## 2024-08-21 NOTE — PROGRESS NOTES
[facility]                                     Progress Note    Patient Name: Sofi Escobedo  Date: 8/21/24         Service Type: Individual      Session Start Time: 2:01 pm  Session End Time: 2:59 pm     Session Length: 58 minutes    Session #: 44    Attendees: Client attended alone    Service Modality:  Video Visit:      Provider verified identity through the following two step process.  Patient provided:  Patient is known previously to provider    Telemedicine Visit: The patient's condition can be safely assessed and treated via synchronous audio and visual telemedicine encounter.      Reason for Telemedicine Visit: Services only offered telehealth    Originating Site (Patient Location): Patient's home    Distant Site (Provider Location): Provider Remote Setting- Home Office    Consent:  The patient/guardian has verbally consented to: the potential risks and benefits of telemedicine (video visit) versus in person care; bill my insurance or make self-payment for services provided; and responsibility for payment of non-covered services.     Patient would like the video invitation sent by:  My Chart    Mode of Communication:  Video Conference via Amwell    Distant Location (Provider):  Off-site    As the provider I attest to compliance with applicable laws and regulations related to telemedicine.    DATA  Interactive Complexity: No  Crisis: No  Extended Session (53+ minutes): PROLONGED SERVICE IN THE OUTPATIENT SETTING REQUIRING DIRECT (FACE-TO-FACE) PATIENT CONTACT BEYOND THE USUAL SERVICE:    - Patient's presenting concerns require more intensive intervention than could be completed within the usual service     Progress Since Last Session (Related to Symptoms / Goals / Homework):   Symptoms: No change pt has  maintained gains    Homework: Achieved / completed to satisfaction      Episode of Care Goals: Satisfactory progress - PREPARATION (Decided to change - considering how);  "Intervened by negotiating a change plan and determining options / strategies for behavior change, identifying triggers, exploring social supports, and working towards setting a date to begin behavior change     Current / Ongoing Stressors and Concerns:   Pt is currently seeking therapy due to ongoing anxiety/depression and to better manage his temper. Pt moved to [place] from [place] a few years ago and establishing a support system has been challenging due to regional cultural differences. Prior to moving to [place] pt has dealt with a lot of family drama and an unsupportive environment growing up, which pt is no coming to see feeds into abandonment/attachment issues and relationship patterns. Since last session, pt went to his friend's serena birthday and came to the conclusion that he doesn't want to attend anymore of these family events as they are connected to his ex. Pt's friend has been supportive of this decision and reiterated to him that they are friends outside of that dynamic. Pt processed through how his interactions with his ex went. Pt reflected that they ended up reconnecting as friends, during which he felt like they were creating a bond, and yet he has continued to get mixed messages from her. After they interacted pt has been feeling really confused about the situation and is experiencing a lot of internal tension about letting their relationship go. Anxiety has been really high recently, especially after he saw his ex again, and grounding exercises were discussed for pt to try. Pt verbalized pushing himself to have the tough conversation with his current romantic interest about his current internal conflict around his ex and being unsure it they should keep pursuing a relationship. Pt discussed how his attachment style and love language may play a role in his relationship dynamics. Pt agreed that he feels he needs to test his relationships to feel secure that they won't abandon him when there are \"real " "problems\", agreeing that this strategy has consistently backfired. Furthermore, pt is anxious about being rejected and tends to self-sabotage if he starts to have doubts about the relationship or himself. Upon further discussion, pt agreed that given his experiences growing up he will sometimes have the feeling of a void that he wants to fill, which makes him seek out immediate gratification that ultimately doesn't last and can encourage this self-sabotaging behavior. While discussing this pt acknowledged that he tends to diminish his successes as he will fixate on what he thinks he is failing at, especially feelings of failure in his romantic relationships. Session went longer then anticipated due to presenting concerns needing to be addressed.     Personal Goals:  Good Credit   license  House  Be with a partner that is invested in pt and is financially independent     Treatment Objective(s) Addressed in This Session:   identify and compliment positives at least 3 times daily to support positive self-image  identify triggers, thoughts, and current stressors which contribute to feelings of anxiety  identify patterns of escalation  (i.e. tightness in chest, flushed face, increased heart rate, clenched hands, etc.)  identify at least 3 techniques for intervening on the escalation  use cognitive strategies identified in therapy to challenge anxious thoughts  Decrease frequency and intensity of feeling down, depressed, hopeless  Identify negative self-talk and behaviors: challenge core beliefs, myths, and actions  identify two areas of life that you would like to have improved functioning  identify at least 3 self-care activities     Intervention:   CBT: Reviewed recently behavior patterns and environmental factors that impact them    Motivational Interviewing    MI Intervention: Expressed Empathy/Understanding, Supported Autonomy, Collaboration, Evocation, Open-ended questions, Reflections: simple and " complex, Change talk (evoked), and Reframe     Change Talk Expressed by the Patient: Desire to change Ability to change Reasons to change Need to change Committment to change Activation Taking steps    Provider Response to Change Talk: E - Evoked more info from patient about behavior change, A - Affirmed patient's thoughts, decisions, or attempts at behavior change, R - Reflected patient's change talk, and S - Summarized patient's change talk statements    Psychodynamic: Processed through internal-experiences related to interpersonal relationships  Solution Focused: Identified self-imposed barriers and how he has worked to overcome them    Assessments completed prior to visit:  PHQ2:       7/31/2024     1:01 PM 7/17/2024     1:45 PM 4/12/2024    12:27 PM 3/29/2024     9:30 AM 3/22/2024     1:01 PM 12/19/2023     9:58 AM 9/26/2023     9:48 AM   PHQ-2 ( 1999 [employer])   Q1: Little interest or pleasure in doing things 1 1 1 1 1 1 1   Q2: Feeling down, depressed or hopeless 1 1 1 1 1 1 1   PHQ-2 Score 2 2 2 2 2 2 2   Q1: Little interest or pleasure in doing things Several days Several days Several days Several days Several days Several days Several days   Q2: Feeling down, depressed or hopeless Several days Several days Several days Several days Several days Several days Several days   PHQ-2 Score 2 2 2 2 2 2 2     PHQ9:       4/12/2023     4:37 PM 1/11/2024     4:02 PM   PHQ-9 SCORE   PHQ-9 Total Score MyChart 5 (Mild depression) 9 (Mild depression)   PHQ-9 Total Score 5 9     GAD2:       10/30/2023    12:07 PM 12/19/2023     9:58 AM 3/20/2024    10:41 PM 3/29/2024     9:30 AM 4/5/2024     9:51 AM 7/17/2024     1:46 PM 7/31/2024     1:01 PM   BISHOP-2   Feeling nervous, anxious, or on edge 1 1 1 1 1 3 1   Not being able to stop or control worrying 1 1 1 1 1 3 1   BISHOP-2 Total Score 2 2 2 2 2 6 2        ASSESSMENT: Current Emotional / Mental Status (status of significant symptoms):   Risk status (Self / Other harm or suicidal  ideation)   Patient denies current fears or concerns for personal safety.   Patient denies current or recent suicidal ideation or behaviors.   Patient denies current or recent homicidal ideation or behaviors.   Patient denies current or recent self injurious behavior or ideation.   Patient denies other safety concerns.   Patient reports there has been no change in risk factors since their last session.     Patient reports there has been no change in protective factors since their last session.     Recommended that patient call 911 or go to the local ED should there be a change in any of these risk factors.     Appearance:   Appropriate    Eye Contact:   Good    Psychomotor Behavior: Normal    Attitude:   Cooperative  Interested Friendly Pleasant   Orientation:   All   Speech    Rate / Production: Normal/ Responsive Talkative    Volume:  Normal    Mood:    Anxious  Normal Sad    Affect:    Appropriate  Worrisome  conflicted    Thought Content:  Clear    Thought Form:  Coherent  Logical    Insight:    Fair      Medication Review:   No current psychiatric medications prescribed     Medication Compliance:   NA     Changes in Health Issues:   None reported     Chemical Use Review:   Substance Use: Chemical use reviewed, no active concerns identified      Tobacco Use: No change in amount of tobacco use since last session.  Provided encouragement to quit   Patient declined discussion at this time    Diagnosis:  1. Adjustment disorder with mixed anxiety and depressed mood      Collateral Reports Completed:   Not Applicable    PLAN: (Patient Tasks / Therapist Tasks / Other)  Watch video on attraction  Review grounding techniques handout  Evaluate how you can define personal success outside of your relationship    Judd Clemons [facility]                                                         ______________________________________________________________________    Individual Treatment Plan    Patient's Name: Donavon En  Gregg  YOB: 1995    Date of Creation: 4/26/23  Date Treatment Plan Last Reviewed/Revised: 6/7/24    DSM5 Diagnoses: Adjustment Disorders  309.28 (F43.23) With mixed anxiety and depressed mood  Psychosocial / Contextual Factors: Pt recently moved to [place] from [place] and has been dealing with the culture shift. Pt has also been working on trying to get himself established here which has been challenging.  PROMIS (reviewed every 90 days):   PROMIS-10 Scores        7/3/2024     2:41 PM 7/17/2024     1:47 PM 7/31/2024     1:02 PM   PROMIS-10 Total Score w/o Sub Scores   PROMIS TOTAL - SUBSCORES 26 25 26       Referral / Collaboration:  Referral to another professional/service is not indicated at this time..    Anticipated number of session for this episode of care: 9-12 sessions  Anticipation frequency of session: Weekly  Anticipated Duration of each session: 38-52 minutes  Treatment plan will be reviewed in 90 days or when goals have been changed.       MeasurableTreatment Goal(s) related to diagnosis / functional impairment(s)  Goal 1: Patient will better manage his anger/frustration, not getting as easily aggravated, and not letting comments get to him as easily.    I will know I've met my goal when I am able to not let others get to me so much.      Objective #A (Patient Action)    Patient will identify triggers, thoughts, and current stressors which contribute to feelings of anxiety  identify patterns of escalation  (i.e. tightness in chest, flushed face, increased heart rate, clenched hands, etc.)  identify at least 3 techniques for intervening on the escalation  use cognitive strategies identified in therapy to challenge anxious thoughts  Increase interest, engagement, and pleasure in doing things  Decrease frequency and intensity of feeling down, depressed, hopeless  Identify negative self-talk and behaviors: challenge core beliefs, myths, and actions  Improve concentration, focus, and mindfulness in  daily activities   identify 3 coping strategies for improving mood  learn at least 3 self-regulation strategies.  Status: Continued - Date(s): 6/7/24    Intervention(s)  Therapist will assign homework related to target objective with the intent to promote pt autonomy and better manage MH.  Facilitate increase in self-awareness  Provide psycho-education on emotion identification/regulation and coping skills  Teach/promote utilization of mindfulness skills and grounding    Goal 2: Patient will feel worthy and be able to find validation internally.     I will know I've met my goal when able to feel more complete or worthy without needing it externally.      Objective #A (Patient Action)    Status: Continued - Date(s): 6/7/24    Patient will identify and compliment positives at least 3 times daily to support positive self-image  Decrease frequency and intensity of feeling down, depressed, hopeless  Identify negative self-talk and behaviors: challenge core beliefs, myths, and actions  identify 5 traits or charcteristics you like about yourself  identify at least 3 self-care activities.    Intervention(s)  Therapist will assign homework related to target objective with the intent to promote pt autonomy and better manage MH.  Facilitate increase in self-awareness  Provide psycho-education on self-care/compassion and narrative therapy interventions  Teach/promote utilization of reframing and boundary setting    Objective #B Being more authentic with myself and others through improving my self-esteem.   Patient will participate in social activities to improve mood  learn & utilize at least 3 assertive communication skills weekly  identify 5 traits or charcteristics you like about yourself  identify at least 3 adaptive coping statements to counteract negative thoughts.    Status: Continued - Date(s): 6/7/24    Intervention(s)  Therapist will assign homework related to target objective with the intent to promote pt autonomy  and better manage MH.  Facilitate increase in self-awareness  Provide psycho-education on self-esteem building and self-exploration  Teach/promote utilization of positive self-affirmations and critical observation skills    Patient has reviewed and agreed to the above plan.      Judd Clemons, [facility]  April 26, 2023

## 2024-08-28 ENCOUNTER — OFFICE VISIT (OUTPATIENT)
Dept: URGENT CARE | Facility: URGENT CARE | Age: 29
End: 2024-08-28
Payer: COMMERCIAL

## 2024-08-28 ENCOUNTER — HOSPITAL ENCOUNTER (OUTPATIENT)
Dept: CT IMAGING | Facility: CLINIC | Age: 29
Discharge: HOME | End: 2024-08-28
Payer: COMMERCIAL

## 2024-08-28 VITALS
TEMPERATURE: 97 F | SYSTOLIC BLOOD PRESSURE: 142 MMHG | WEIGHT: 247.6 LBS | RESPIRATION RATE: 16 BRPM | HEART RATE: 77 BPM | BODY MASS INDEX: 36.97 KG/M2 | DIASTOLIC BLOOD PRESSURE: 91 MMHG | OXYGEN SATURATION: 98 %

## 2024-08-28 DIAGNOSIS — R31.9 HEMATURIA, UNSPECIFIED TYPE: ICD-10-CM

## 2024-08-28 DIAGNOSIS — F41.9 ANXIETY: Primary | ICD-10-CM

## 2024-08-28 LAB
ALBUMIN UR-MCNC: NEGATIVE MG/DL
ANION GAP SERPL CALCULATED.3IONS-SCNC: 12 MMOL/L (ref 7–15)
APPEARANCE UR: CLEAR
BACTERIA #/AREA URNS HPF: ABNORMAL /HPF
BASOPHILS # BLD AUTO: 0 10E3/UL (ref 0–0.2)
BASOPHILS NFR BLD AUTO: 1 %
BILIRUB UR QL STRIP: NEGATIVE
BUN SERPL-MCNC: 13.2 MG/DL (ref 6–20)
CALCIUM SERPL-MCNC: 9.4 MG/DL (ref 8.8–10.4)
CHLORIDE SERPL-SCNC: 103 MMOL/L (ref 98–107)
COLOR UR AUTO: YELLOW
CREAT SERPL-MCNC: 1.16 MG/DL (ref 0.67–1.17)
EGFRCR SERPLBLD CKD-EPI 2021: 87 ML/MIN/1.73M2
EOSINOPHIL # BLD AUTO: 0 10E3/UL (ref 0–0.7)
EOSINOPHIL NFR BLD AUTO: 1 %
ERYTHROCYTE [DISTWIDTH] IN BLOOD BY AUTOMATED COUNT: 17.6 % (ref 10–15)
GLUCOSE SERPL-MCNC: 103 MG/DL (ref 70–99)
GLUCOSE UR STRIP-MCNC: NEGATIVE MG/DL
HCO3 SERPL-SCNC: 24 MMOL/L (ref 22–29)
HCT VFR BLD AUTO: 39.9 % (ref 40–53)
HGB BLD-MCNC: 12.6 G/DL (ref 13.3–17.7)
HGB UR QL STRIP: ABNORMAL
IMM GRANULOCYTES # BLD: 0 10E3/UL
IMM GRANULOCYTES NFR BLD: 0 %
KETONES UR STRIP-MCNC: NEGATIVE MG/DL
LEUKOCYTE ESTERASE UR QL STRIP: NEGATIVE
LYMPHOCYTES # BLD AUTO: 2 10E3/UL (ref 0.8–5.3)
LYMPHOCYTES NFR BLD AUTO: 33 %
MCH RBC QN AUTO: 22 PG (ref 26.5–33)
MCHC RBC AUTO-ENTMCNC: 31.6 G/DL (ref 31.5–36.5)
MCV RBC AUTO: 70 FL (ref 78–100)
MONOCYTES # BLD AUTO: 0.5 10E3/UL (ref 0–1.3)
MONOCYTES NFR BLD AUTO: 9 %
NEUTROPHILS # BLD AUTO: 3.5 10E3/UL (ref 1.6–8.3)
NEUTROPHILS NFR BLD AUTO: 57 %
NITRATE UR QL: NEGATIVE
PH UR STRIP: 6.5 [PH] (ref 5–7)
PLATELET # BLD AUTO: 222 10E3/UL (ref 150–450)
POTASSIUM SERPL-SCNC: 3.9 MMOL/L (ref 3.4–5.3)
RBC # BLD AUTO: 5.74 10E6/UL (ref 4.4–5.9)
RBC #/AREA URNS AUTO: >100 /HPF
SODIUM SERPL-SCNC: 139 MMOL/L (ref 135–145)
SP GR UR STRIP: 1.02 (ref 1–1.03)
SQUAMOUS #/AREA URNS AUTO: ABNORMAL /LPF
UROBILINOGEN UR STRIP-ACNC: 0.2 E.U./DL
WBC # BLD AUTO: 6.1 10E3/UL (ref 4–11)
WBC #/AREA URNS AUTO: ABNORMAL /HPF

## 2024-08-28 PROCEDURE — 81001 URINALYSIS AUTO W/SCOPE: CPT

## 2024-08-28 PROCEDURE — 99214 OFFICE O/P EST MOD 30 MIN: CPT

## 2024-08-28 PROCEDURE — 80048 BASIC METABOLIC PNL TOTAL CA: CPT

## 2024-08-28 PROCEDURE — 250N000011 HC RX IP 250 OP 636

## 2024-08-28 PROCEDURE — 250N000009 HC RX 250

## 2024-08-28 PROCEDURE — 74178 CT ABD&PLV WO CNTR FLWD CNTR: CPT

## 2024-08-28 PROCEDURE — 85025 COMPLETE CBC W/AUTO DIFF WBC: CPT

## 2024-08-28 PROCEDURE — 36415 COLL VENOUS BLD VENIPUNCTURE: CPT

## 2024-08-28 RX ORDER — HYDROXYZINE PAMOATE 25 MG/1
25-50 CAPSULE ORAL 3 TIMES DAILY PRN
Qty: 90 CAPSULE | Refills: 11 | Status: SHIPPED | OUTPATIENT
Start: 2024-08-28

## 2024-08-28 RX ORDER — IOPAMIDOL 755 MG/ML
120 INJECTION, SOLUTION INTRAVASCULAR ONCE
Status: COMPLETED | OUTPATIENT
Start: 2024-08-28 | End: 2024-08-28

## 2024-08-28 RX ADMIN — IOPAMIDOL 120 ML: 755 INJECTION, SOLUTION INTRAVENOUS at 14:25

## 2024-08-28 RX ADMIN — SODIUM CHLORIDE 100 ML: 9 INJECTION, SOLUTION INTRAVENOUS at 14:25

## 2024-08-28 NOTE — PROGRESS NOTES
Assessment & Plan       ICD-10-CM    1. Anxiety  F41.9 hydrOXYzine tiffany (VISTARIL) 25 MG capsule      2. Hematuria, unspecified type  R31.9 UA Macroscopic with reflex to Microscopic and Culture - Clinic Collect     UA Microscopic with Reflex to Culture     Basic metabolic panel  (Ca, Cl, CO2, Creat, Gluc, K, Na, BUN)     CBC with platelets and differential     Basic metabolic panel  (Ca, Cl, CO2, Creat, Gluc, K, Na, BUN)     CBC with platelets and differential     CT Urogram wo & w Contrast     Adult Urology  Referral     CANCELED: CT Abdomen Pelvis w Contrast     CANCELED: CT Abdomen Pelvis w/o & w Contrast         Dipstick positive for heme and micro showed RBCs >100. No flank pain. No urine WBC, nitrites or UTI sx. Seeing clots when he urinates. Recommendation for that is abdominopelvic CT w/o and w/ contrast and urgent urology referral (will defer referral until after we have imaging, given pt age). Will also get CBC and BMP to assess renal function and WBC.     CT: Unremarkable   CBC: Mild microcytic anemia - hgb 12.6, MCV 70  BMP: pending    After normal CT results, I made referral to urology and informed patient. Will also check in once BMP back.       Follow up with primary care provider with any problems, questions or concerns or if symptoms worsen or fail to improve. Patient agreed to plan and verbalized understanding.     Marian Farah is a 28 year old male who presents to clinic today for the following health issues:  Chief Complaint   Patient presents with    Urgent Care    Hematuria     Pt reports noticed blood in urine with blood clot this morning - Hx UTI's and kidney infection  Frequency   Denies urinary pain     Diarrhea     4 episodes of diarrhea onset 3:00 AM today      HPI    Had a new partner recently and had STI testing after, all normal. But he's having blood in his urine now and diarrhea, so would like to be checked for UTI. No flank or back pain. Does have thalassemia and  htn that they're not sure about the cause.     Review of Systems    10 point ROS performed and negative except as noted in HPI.     Problem List:  There are no relevant problems documented for this patient.      Past Medical History:   Diagnosis Date    Alpha thalassemia-2 trait        Social History     Tobacco Use    Smoking status: Former     Current packs/day: 0.00     Types: Vaping Device, Cigarettes     Quit date: 2021     Years since quitting: 3.6    Smokeless tobacco: Never   Substance Use Topics    Alcohol use: Yes     Alcohol/week: 4.0 - 5.0 standard drinks of alcohol     Types: 4 - 5 Standard drinks or equivalent per week           Objective    BP (!) 142/91 (BP Location: Left arm, Patient Position: Sitting, Cuff Size: Adult Large)   Pulse 77   Temp 97  F (36.1  C) (Oral)   Resp 16   Wt 112.3 kg (247 lb 9.6 oz)   SpO2 98%   BMI 36.97 kg/m    Physical Exam   Constitutional:       General: Patient is not in acute distress.     Appearance: Normal appearance.   HENT:      Head: Normocephalic and atraumatic.      Right Ear: External ear normal.      Left Ear: External ear normal.      Nose: No congestion, rhinorrhea.      Mouth/Throat:      Mouth: Mucous membranes are moist.      Pharynx: Oropharynx is clear, No exudate.   Eyes:      General: No scleral icterus.     Extraocular Movements: Extraocular movements intact.      Conjunctiva/sclera: Conjunctivae normal.      Pupils: Pupils are equal, round, and reactive to light.   Pulmonary:      Effort: Pulmonary effort is normal.   Cardiovascular:      Regular heart rate  Abdominal:      General: Abdomen is flat.   Musculoskeletal:         General: No swelling or deformity. Normal range of motion.      Cervical back: Normal range of motion and neck supple.   Skin:     General: Skin is warm and dry.      Coloration: Skin is not jaundiced.      Findings: No bruising, lesion or rash.   Neurological:      General: No focal deficit present.      Mental Status:  Patient is alert. Mental status is at baseline.   Psychiatric:         Mood and Affect: Mood normal.         Behavior: Behavior normal.         Thought Content: Thought content normal.       Rose Mary Ferrer MD

## 2024-09-04 ENCOUNTER — TELEPHONE (OUTPATIENT)
Dept: FAMILY MEDICINE | Facility: CLINIC | Age: 29
End: 2024-09-04
Payer: COMMERCIAL

## 2024-09-04 NOTE — TELEPHONE ENCOUNTER
Pt states that he has been having blood in is urine and also blood clots. Pt was seen in Urgent care on 8/28/24. Pt had a CT scan on 8/28/24 (see Radiology note). Pt was given a Urologist appointment on 10/10/24. Pt was hoping to be seen sooner being that he is anxious regarding what is causing it. Pt was advised to see if they could be seen someone else sooner? Or if his symptoms become worse or more concerning, be should be seen in the ED.     Anastasia Gimenez RN  [facility]

## 2024-09-18 ENCOUNTER — VIRTUAL VISIT (OUTPATIENT)
Dept: PSYCHOLOGY | Facility: CLINIC | Age: 29
End: 2024-09-18
Payer: COMMERCIAL

## 2024-09-18 DIAGNOSIS — F43.23 ADJUSTMENT DISORDER WITH MIXED ANXIETY AND DEPRESSED MOOD: Primary | ICD-10-CM

## 2024-09-18 PROCEDURE — 90837 PSYTX W PT 60 MINUTES: CPT | Mod: 95 | Performed by: COUNSELOR

## 2024-09-18 NOTE — PROGRESS NOTES
[facility]                                     Progress Note    Patient Name: Sofi Escobedo  Date: 9/18/24         Service Type: Individual      Session Start Time: 2:01 pm  Session End Time: 3:02 pm     Session Length: 61 minutes    Session #: 45    Attendees: Client attended alone    Service Modality:  Video Visit:      Provider verified identity through the following two step process.  Patient provided:  Patient is known previously to provider    Telemedicine Visit: The patient's condition can be safely assessed and treated via synchronous audio and visual telemedicine encounter.      Reason for Telemedicine Visit: Services only offered telehealth    Originating Site (Patient Location): Patient's home    Distant Site (Provider Location): Provider Remote Setting- Home Office    Consent:  The patient/guardian has verbally consented to: the potential risks and benefits of telemedicine (video visit) versus in person care; bill my insurance or make self-payment for services provided; and responsibility for payment of non-covered services.     Patient would like the video invitation sent by:  My Chart    Mode of Communication:  Video Conference via Amwell    Distant Location (Provider):  Off-site    As the provider I attest to compliance with applicable laws and regulations related to telemedicine.    DATA  Interactive Complexity: No  Crisis: No  Extended Session (53+ minutes): PROLONGED SERVICE IN THE OUTPATIENT SETTING REQUIRING DIRECT (FACE-TO-FACE) PATIENT CONTACT BEYOND THE USUAL SERVICE:    - Longer session due to limited access to mental health appointments and necessity to address patient's distress / complexity     Progress Since Last Session (Related to Symptoms / Goals / Homework):   Symptoms: Improving pt has been feeling more confident and better managing his anxiety with new meds     Homework: Achieved / completed to satisfaction      Episode of Care Goals: Satisfactory  progress - ACTION (Actively working towards change); Intervened by reinforcing change plan / affirming steps taken     Current / Ongoing Stressors and Concerns:   Pt is currently seeking therapy due to ongoing anxiety/depression and to better manage his temper. Pt moved to [place] from [place] a few years ago and establishing a support system has been challenging due to regional cultural differences. Prior to moving to [place] pt has dealt with a lot of family drama and an unsupportive environment growing up, which pt is no coming to see feeds into abandonment/attachment issues and relationship patterns. Since last session, pt ended up having some medical issues with his kidneys which is still being investigated and he just got back from [facility] which he took the chance to relax. Pt went out there to see his step-mom initially, but while there found his dad and bio-mom to be irritating to him. Pt clarified that his dad tried to monopolize his time and it felt like he tried to engage in some emotional manipulation which was very frustrating. Pt tried to see his bio-mom who cried because he wasn't immediately available and stone-walled him after blaming him for not making time for her, which he recognized was an unfair portrayal, which was incredibly frustrating. Otherwise, his other interactions went really well and he felt like he was able to reconnect with folks back home, which helped him rebuild his confidence. Since getting back pt has felt like he needs to recharge, as his social battery got drained while in [facility]. Pt picked up his new dog, mariel, and he has been a big help in pt feeling less alone. Pt confirmed that his dog has been providing him a sense of emotional support and unconditional love. His dog is still a puppy and this is the first time he has been completely responsible for a pet/dog. Pt started a new anxiety med which has been helpful with sleep. Pt expressed that his situation with his ex has been a  mixed bag and given the drama with it, is feeling like he is over it. Pt was doing well with giving her space and not talking to her for a month, then she reached out about his new dog. They were supposed to reconnect after he got back but she has ghosted him again. At this point pt is feeling that while he still has feelings for his ex, he is getting over her and coming to recognize that he deserves a reciprocal relationship. Things at work have been going better as well and he is feeling less stressed about that. Though he might be made to change work schedules again, which he doesn't like as his current schedule works well for him. Session went longer then anticipated due to lack of recent therapy visits. Trx plan was updated.    Personal Goals:  Good Credit   license  House  Be with a partner that is invested in pt and is financially independent     Treatment Objective(s) Addressed in This Session:   identify and compliment positives at least 3 times daily to support positive self-image  identify triggers, thoughts, and current stressors which contribute to feelings of anxiety  identify patterns of escalation  (i.e. tightness in chest, flushed face, increased heart rate, clenched hands, etc.)  identify at least 3 techniques for intervening on the escalation  use cognitive strategies identified in therapy to challenge anxious thoughts  Decrease frequency and intensity of feeling down, depressed, hopeless  Identify negative self-talk and behaviors: challenge core beliefs, myths, and actions  identify two areas of life that you would like to have improved functioning  identify at least 3 self-care activities     Intervention:   CBT: Reviewed recently behavior patterns and environmental factors that impact them    Motivational Interviewing    MI Intervention: Expressed Empathy/Understanding, Supported Autonomy, Collaboration, Evocation, Open-ended questions, Reflections: simple and complex, Change  talk (evoked), and Reframe     Change Talk Expressed by the Patient: Desire to change Ability to change Reasons to change Need to change Committment to change Activation Taking steps    Provider Response to Change Talk: E - Evoked more info from patient about behavior change, A - Affirmed patient's thoughts, decisions, or attempts at behavior change, R - Reflected patient's change talk, and S - Summarized patient's change talk statements    Psychodynamic: Processed through internal-experiences related to interpersonal relationships  Solution Focused: Identified self-imposed barriers and how he has worked to overcome them    Assessments completed prior to visit:  PHQ2:       7/31/2024     1:01 PM 7/17/2024     1:45 PM 4/12/2024    12:27 PM 3/29/2024     9:30 AM 3/22/2024     1:01 PM 12/19/2023     9:58 AM 9/26/2023     9:48 AM   PHQ-2 ( 1999 [employer])   Q1: Little interest or pleasure in doing things 1 1 1 1 1 1 1   Q2: Feeling down, depressed or hopeless 1 1 1 1 1 1 1   PHQ-2 Score 2 2 2 2 2 2 2   Q1: Little interest or pleasure in doing things Several days Several days Several days Several days Several days Several days Several days   Q2: Feeling down, depressed or hopeless Several days Several days Several days Several days Several days Several days Several days   PHQ-2 Score 2 2 2 2 2 2 2     PHQ9:       4/12/2023     4:37 PM 1/11/2024     4:02 PM   PHQ-9 SCORE   PHQ-9 Total Score MyChart 5 (Mild depression) 9 (Mild depression)   PHQ-9 Total Score 5 9     GAD2:       10/30/2023    12:07 PM 12/19/2023     9:58 AM 3/20/2024    10:41 PM 3/29/2024     9:30 AM 4/5/2024     9:51 AM 7/17/2024     1:46 PM 7/31/2024     1:01 PM   BISHOP-2   Feeling nervous, anxious, or on edge 1 1 1 1 1 3 1   Not being able to stop or control worrying 1 1 1 1 1 3 1   BISHOP-2 Total Score 2 2 2 2 2 6 2        ASSESSMENT: Current Emotional / Mental Status (status of significant symptoms):   Risk status (Self / Other harm or suicidal  ideation)   Patient denies current fears or concerns for personal safety.   Patient denies current or recent suicidal ideation or behaviors.   Patient denies current or recent homicidal ideation or behaviors.   Patient denies current or recent self injurious behavior or ideation.   Patient denies other safety concerns.   Patient reports there has been no change in risk factors since their last session.     Patient reports there has been no change in protective factors since their last session.     Recommended that patient call 911 or go to the local ED should there be a change in any of these risk factors.     Appearance:   Appropriate    Eye Contact:   Good    Psychomotor Behavior: Normal    Attitude:   Cooperative  Interested Friendly Pleasant   Orientation:   All   Speech    Rate / Production: Normal/ Responsive Talkative    Volume:  Normal    Mood:    Anxious  Normal   Affect:    Appropriate  Blunted    Thought Content:  Clear    Thought Form:  Coherent  Logical    Insight:    Fair  and Intellectual Insight     Medication Review:   Changes to psychiatric medications, see updated Medication List in EPIC.      Medication Compliance:   Yes     Changes in Health Issues:   None reported     Chemical Use Review:   Substance Use: Chemical use reviewed, no active concerns identified      Tobacco Use: No change in amount of tobacco use since last session.  Provided encouragement to quit   Patient declined discussion at this time    Diagnosis:  1. Adjustment disorder with mixed anxiety and depressed mood      Collateral Reports Completed:   Not Applicable    PLAN: (Patient Tasks / Therapist Tasks / Other)  Watch video on attraction  Continue to engage in self-care  Follow-up on vet stuff for new puppy  Maintain emotional boundaries with ex    LADONNA Paez                                                         ______________________________________________________________________    Individual Treatment  Plan    Patient's Name: Sofi Escobedo  YOB: 1995    Date of Creation: 4/26/23  Date Treatment Plan Last Reviewed/Revised: 9/18/24    DSM5 Diagnoses: Adjustment Disorders  309.28 (F43.23) With mixed anxiety and depressed mood  Psychosocial / Contextual Factors: Pt recently moved to [place] from [place] and has been dealing with the culture shift. Pt has also been working on trying to get himself established here which has been challenging.  PROMIS (reviewed every 90 days):   PROMIS-10 Scores        7/3/2024     2:41 PM 7/17/2024     1:47 PM 7/31/2024     1:02 PM   PROMIS-10 Total Score w/o Sub Scores   PROMIS TOTAL - SUBSCORES 26 25 26       Referral / Collaboration:  Referral to another professional/service is not indicated at this time..    Anticipated number of session for this episode of care: 9-12 sessions  Anticipation frequency of session: Weekly  Anticipated Duration of each session: 38-52 minutes  Treatment plan will be reviewed in 90 days or when goals have been changed.       MeasurableTreatment Goal(s) related to diagnosis / functional impairment(s)  Goal 1: Patient will better manage his anger/frustration, not getting as easily aggravated, and not letting comments get to him as easily.    I will know I've met my goal when I am able to not let others get to me so much.      Objective #A (Patient Action)    Patient will identify triggers, thoughts, and current stressors which contribute to feelings of anxiety  identify patterns of escalation  (i.e. tightness in chest, flushed face, increased heart rate, clenched hands, etc.)  identify at least 3 techniques for intervening on the escalation  use cognitive strategies identified in therapy to challenge anxious thoughts  Increase interest, engagement, and pleasure in doing things  Decrease frequency and intensity of feeling down, depressed, hopeless  Identify negative self-talk and behaviors: challenge core beliefs, myths, and actions  Improve  concentration, focus, and mindfulness in daily activities   identify 3 coping strategies for improving mood  learn at least 3 self-regulation strategies.  Status: Continued - Date(s): 9/18/24    Intervention(s)  Therapist will assign homework related to target objective with the intent to promote pt autonomy and better manage MH.  Facilitate increase in self-awareness  Provide psycho-education on emotion identification/regulation and coping skills  Teach/promote utilization of mindfulness skills and grounding    Goal 2: Patient will feel worthy and be able to find validation internally.     I will know I've met my goal when able to feel more complete or worthy without needing it externally.      Objective #A (Patient Action)    Status: Continued - Date(s): 9/18/24    Patient will identify and compliment positives at least 3 times daily to support positive self-image  Decrease frequency and intensity of feeling down, depressed, hopeless  Identify negative self-talk and behaviors: challenge core beliefs, myths, and actions  identify 5 traits or charcteristics you like about yourself  identify at least 3 self-care activities.    Intervention(s)  Therapist will assign homework related to target objective with the intent to promote pt autonomy and better manage MH.  Facilitate increase in self-awareness  Provide psycho-education on self-care/compassion and narrative therapy interventions  Teach/promote utilization of reframing and boundary setting    Objective #B Being more authentic with myself and others through improving my self-esteem.   Patient will participate in social activities to improve mood  learn & utilize at least 3 assertive communication skills weekly  identify 5 traits or charcteristics you like about yourself  identify at least 3 adaptive coping statements to counteract negative thoughts.    Status: Continued - Date(s): 9/18/24    Intervention(s)  Therapist will assign homework related to target  objective with the intent to promote pt autonomy and better manage MH.  Facilitate increase in self-awareness  Provide psycho-education on self-esteem building and self-exploration  Teach/promote utilization of positive self-affirmations and critical observation skills    Patient has reviewed and agreed to the above plan.      Judd Clemons, [facility]  April 26, 2023

## 2024-09-24 ENCOUNTER — VIRTUAL VISIT (OUTPATIENT)
Dept: UROLOGY | Facility: CLINIC | Age: 29
End: 2024-09-24
Payer: COMMERCIAL

## 2024-09-24 DIAGNOSIS — R31.9 HEMATURIA, UNSPECIFIED TYPE: Primary | ICD-10-CM

## 2024-09-24 PROCEDURE — 88112 CYTOPATH CELL ENHANCE TECH: CPT | Mod: 95 | Performed by: PATHOLOGY

## 2024-09-24 PROCEDURE — 99203 OFFICE O/P NEW LOW 30 MIN: CPT | Mod: 95 | Performed by: NURSE PRACTITIONER

## 2024-09-24 NOTE — NURSING NOTE
Current patient location:  [place]    Is the patient currently in the state of [place]? YES    Visit mode:VIDEO    If the visit is dropped, the patient can be reconnected by: VIDEO VISIT: Text to cell phone:   Telephone Information:   Mobile [phone]       Will anyone else be joining the visit? NO  (If patient encounters technical issues they should call [phone]    How would you like to obtain your AVS? MyChart    Are changes needed to the allergy or medication list? Yes pt has requested removal of medications     Are refills needed on medications prescribed by this physician? NO- new pt     Rooming Documentation:  Not applicable    Reason for visit: Consult    Christen MUNIZ

## 2024-09-24 NOTE — LETTER
9/24/2024       RE: Sofi Escobedo  [address]  [facility] [address]     Dear Colleague,    Thank you for referring your patient, Sofi Escobedo, to the [facility]. Please see a copy of my visit note below.    Virtual Visit Details  Type of service:  Video Visit   Originating Location (pt. Location): Home  Distant Location (provider location):  On-site  Platform used for Video Visit: [facility]      Urology Virtual Visit    Name: Sofi Escobedo    MRN: [id number]   YOB: 1995               Chief Complaint:   Gross hematuria          Impression and Plan:   Impression / Plan:   Sofi Escobedo is a 28 year old male with gross hematuria w clots      It was my pleasure to meet with Sofi Escobedo in the clinic today regarding their recently discovered gross hematuria. We discussed possible etiologies of gross hematuria including both benign and malignant causes. We discussed the American Urological Association's recommendation for workup of gross hematuria which would include a CT urogram, cystoscopy, and in the case of gross hematuria, a urine cytology. I described these 3 tests to the patient in detail.    After reviewing the above information, Sofi Escobedo expressed understanding and agreement with moving forward with the cytology and cystoscopy.     If the hematuria evaluation is negative, the patient should undergo a repeat urinalysis in one year. If this repeat urinalysis is also negative for microscopic hematuria, no further workup is needed. If there is persistent microscopic hematuria, we will need to enter into shared decision making regarding repeat evaluation with CT Urogram and cystoscopy vs. Observation.     All comments, questions, and concerns were answered.    Thank you for the opportunity to participate in the care of Sofi  En Escobedo.     FAM Cannon, CNP  [facility] - Department of Urology  [phone]          History of Present Illness:   Patient is a 28 year old male with Hx of vape use, former cigarette smoker, presenting with dark red gross hematuria with clots that lasted for 2-3 weeks. Patient does not have a family history of kidney disease. denies family history of  cancers or Stringer Syndrome. denies history of vigorous exercise or trauma. Denies fever, night sweats, unintentional weight loss, dysuria, new onset urinary urgency/frequency, flank pain. Reports had a UTIs, last occurred about 2 yrs ago. Recent Cr 1.16.     History is obtained from patient & EMR    Pertinent imaging reviewed:  CT UROGRAM WO & W CONTRAST 8/28/2024 2:50 PM   IMPRESSION:   1.  No urinary system calculi, hydronephrosis, renal masses or  urothelial lesions. No CT evident cause for hematuria.          Past Medical History:     Past Medical History:   Diagnosis Date     Alpha thalassemia-2 trait           Past Surgical History:     Past Surgical History:   Procedure Laterality Date     ANKLE SURGERY Right 2019    Ligament surgery          Social History:     Social History     Tobacco Use     Smoking status: Former     Current packs/day: 0.00     Types: Vaping Device, Cigarettes     Quit date: 2021     Years since quitting: 3.7     Smokeless tobacco: Never   Substance Use Topics     Alcohol use: Yes     Alcohol/week: 4.0 - 5.0 standard drinks of alcohol     Types: 4 - 5 Standard drinks or equivalent per week          Family History:     Family History   Problem Relation Age of Onset     Hypertension Mother      Hypertension Father      Diabetes Paternal Grandmother      Hypertension Paternal Grandmother      Diabetes Paternal Grandfather      Hypertension Paternal Uncle      Hypertension Paternal Aunt           Allergies:     Allergies   Allergen Reactions     Seasonal Allergies           Medications:     Current Outpatient Medications  "  Medication Sig Dispense Refill     amLODIPine (NORVASC) 10 MG tablet Take 1 tablet (10 mg) by mouth daily 90 tablet 1     hydrOXYzine tiffany (VISTARIL) 25 MG capsule Take 1-2 capsules (25-50 mg) by mouth 3 times daily as needed for itching. 90 capsule 11     ibuprofen (ADVIL/MOTRIN) 800 MG tablet Take 1 tablet (800 mg) by mouth every 8 hours as needed for moderate pain (Patient not taking: Reported on 8/28/2024) 30 tablet 0     No current facility-administered medications for this visit.          Review of Systems:    ROS: See HPI for pertinent details.  Remainder of 10-point ROS negative.         Physical Exam:   VS:  T: Data Unavailable    HR: Data Unavailable    BP: Data Unavailable    RR: Data Unavailable     GEN:  Alert.  NAD.    HEENT:  Sclerae anicteric.    CV:  No obvious jugular venous distension  LUNGS: No respiratory distress, breathing comfortably wo accessory muscle use.  SKIN:  No visible rash  NEURO:  CN grossly intact.          Laboratory Data:   No results found for: \"PSA\"  Lab Results   Component Value Date    CR 1.16 08/28/2024    CR 1.31 06/28/2023    CR 1.24 03/07/2023     Lab Results   Component Value Date    GFRESTIMATED 87 08/28/2024    GFRESTIMATED 76 06/28/2023    GFRESTIMATED 81 03/07/2023              Again, thank you for allowing me to participate in the care of your patient.      Sincerely,    FAM Khan CNP    "

## 2024-09-24 NOTE — PROGRESS NOTES
Virtual Visit Details  Type of service:  Video Visit   Originating Location (pt. Location): Home  Distant Location (provider location):  On-site  Platform used for Video Visit: [facility]      Urology Virtual Visit    Name: Sofi Escobedo    MRN: [id number]   YOB: 1995               Chief Complaint:   Gross hematuria          Impression and Plan:   Impression / Plan:   Sofi Escobedo is a 28 year old male with gross hematuria w clots      It was my pleasure to meet with Sofi Escobedo in the clinic today regarding their recently discovered gross hematuria. We discussed possible etiologies of gross hematuria including both benign and malignant causes. We discussed the American Urological Association's recommendation for workup of gross hematuria which would include a CT urogram, cystoscopy, and in the case of gross hematuria, a urine cytology. I described these 3 tests to the patient in detail.    After reviewing the above information, Sofi Escobedo expressed understanding and agreement with moving forward with the cytology and cystoscopy.     If the hematuria evaluation is negative, the patient should undergo a repeat urinalysis in one year. If this repeat urinalysis is also negative for microscopic hematuria, no further workup is needed. If there is persistent microscopic hematuria, we will need to enter into shared decision making regarding repeat evaluation with CT Urogram and cystoscopy vs. Observation.     All comments, questions, and concerns were answered.    Thank you for the opportunity to participate in the care of Sofi Escobedo.     FAM Cannon, CNP  [facility] - Department of Urology  [phone]          History of Present Illness:   Patient is a 28 year old male with Hx of vape use, former cigarette smoker, presenting with dark red gross hematuria with clots that lasted for 2-3 weeks. Patient does not have a family history of kidney  disease. denies family history of  cancers or Stringer Syndrome. denies history of vigorous exercise or trauma. Denies fever, night sweats, unintentional weight loss, dysuria, new onset urinary urgency/frequency, flank pain. Reports had a UTIs, last occurred about 2 yrs ago. Recent Cr 1.16.     History is obtained from patient & EMR    Pertinent imaging reviewed:  CT UROGRAM WO & W CONTRAST 8/28/2024 2:50 PM   IMPRESSION:   1.  No urinary system calculi, hydronephrosis, renal masses or  urothelial lesions. No CT evident cause for hematuria.          Past Medical History:     Past Medical History:   Diagnosis Date    Alpha thalassemia-2 trait           Past Surgical History:     Past Surgical History:   Procedure Laterality Date    ANKLE SURGERY Right 2019    Ligament surgery          Social History:     Social History     Tobacco Use    Smoking status: Former     Current packs/day: 0.00     Types: Vaping Device, Cigarettes     Quit date: 2021     Years since quitting: 3.7    Smokeless tobacco: Never   Substance Use Topics    Alcohol use: Yes     Alcohol/week: 4.0 - 5.0 standard drinks of alcohol     Types: 4 - 5 Standard drinks or equivalent per week          Family History:     Family History   Problem Relation Age of Onset    Hypertension Mother     Hypertension Father     Diabetes Paternal Grandmother     Hypertension Paternal Grandmother     Diabetes Paternal Grandfather     Hypertension Paternal Uncle     Hypertension Paternal Aunt           Allergies:     Allergies   Allergen Reactions    Seasonal Allergies           Medications:     Current Outpatient Medications   Medication Sig Dispense Refill    amLODIPine (NORVASC) 10 MG tablet Take 1 tablet (10 mg) by mouth daily 90 tablet 1    hydrOXYzine tiffany (VISTARIL) 25 MG capsule Take 1-2 capsules (25-50 mg) by mouth 3 times daily as needed for itching. 90 capsule 11    ibuprofen (ADVIL/MOTRIN) 800 MG tablet Take 1 tablet (800 mg) by mouth every 8 hours as needed for  "moderate pain (Patient not taking: Reported on 8/28/2024) 30 tablet 0     No current facility-administered medications for this visit.          Review of Systems:    ROS: See HPI for pertinent details.  Remainder of 10-point ROS negative.         Physical Exam:   VS:  T: Data Unavailable    HR: Data Unavailable    BP: Data Unavailable    RR: Data Unavailable     GEN:  Alert.  NAD.    HEENT:  Sclerae anicteric.    CV:  No obvious jugular venous distension  LUNGS: No respiratory distress, breathing comfortably wo accessory muscle use.  SKIN:  No visible rash  NEURO:  CN grossly intact.          Laboratory Data:   No results found for: \"PSA\"  Lab Results   Component Value Date    CR 1.16 08/28/2024    CR 1.31 06/28/2023    CR 1.24 03/07/2023     Lab Results   Component Value Date    GFRESTIMATED 87 08/28/2024    GFRESTIMATED 76 06/28/2023    GFRESTIMATED 81 03/07/2023          "

## 2024-09-25 ENCOUNTER — DOCUMENTATION ONLY (OUTPATIENT)
Dept: BEHAVIORAL HEALTH | Facility: HOSPITAL | Age: 29
End: 2024-09-25
Payer: COMMERCIAL

## 2024-09-25 NOTE — PROGRESS NOTES
Therapist (writer) entered the virtual session at the scheduled time of the appointment. Pt did not arrive on time and therapist called pt 10 minutes into the appointment, leaving a VM prompting them to join via [employer] or call the office or reach out through [employer] if they were having an issue. Pt was informed of the date/time of their next follow-up appointment and was encouraged to attend if they were unable to make today's appointment and if they couldn't make it, to cancel it 24 hours in advance. Therapist waited an addition 10-15 minutes for pt to join and when they did not the session was cancelled.

## 2024-09-26 ENCOUNTER — TELEPHONE (OUTPATIENT)
Dept: UROLOGY | Facility: CLINIC | Age: 29
End: 2024-09-26
Payer: COMMERCIAL

## 2024-09-26 NOTE — TELEPHONE ENCOUNTER
----- Message from Khloe WHELAN sent at 9/25/2024  8:50 AM CDT -----  Regarding: Next available cysto  Return for next availabe cysto, urine cytology .    SJI  9/24/24

## 2024-10-02 ENCOUNTER — VIRTUAL VISIT (OUTPATIENT)
Dept: PSYCHOLOGY | Facility: CLINIC | Age: 29
End: 2024-10-02
Payer: COMMERCIAL

## 2024-10-02 DIAGNOSIS — F43.23 ADJUSTMENT DISORDER WITH MIXED ANXIETY AND DEPRESSED MOOD: Primary | ICD-10-CM

## 2024-10-02 LAB
PATH REPORT.COMMENTS IMP SPEC: NORMAL
PATH REPORT.FINAL DX SPEC: NORMAL
PATH REPORT.GROSS SPEC: NORMAL
PATH REPORT.MICROSCOPIC SPEC OTHER STN: NORMAL
PATH REPORT.RELEVANT HX SPEC: NORMAL

## 2024-10-02 PROCEDURE — 90834 PSYTX W PT 45 MINUTES: CPT | Mod: 95 | Performed by: COUNSELOR

## 2024-10-02 NOTE — PROGRESS NOTES
[facility]                                     Progress Note    Patient Name: Sofi Escobedo  Date: 10/02/24         Service Type: Individual      Session Start Time: 2:03 pm  Session End Time: 3:50 pm     Session Length: 47 minutes    Session #: 46    Attendees: Client attended alone    Service Modality:  Video Visit:      Provider verified identity through the following two step process.  Patient provided:  Patient is known previously to provider    Telemedicine Visit: The patient's condition can be safely assessed and treated via synchronous audio and visual telemedicine encounter.      Reason for Telemedicine Visit: Services only offered telehealth    Originating Site (Patient Location): Patient's home    Distant Site (Provider Location): Provider Remote Setting- Home Office    Consent:  The patient/guardian has verbally consented to: the potential risks and benefits of telemedicine (video visit) versus in person care; bill my insurance or make self-payment for services provided; and responsibility for payment of non-covered services.     Patient would like the video invitation sent by:  My Chart    Mode of Communication:  Video Conference via [employer]well    Distant Location (Provider):  Off-site    As the provider I attest to compliance with applicable laws and regulations related to telemedicine.    DATA  Interactive Complexity: No  Crisis: No  Extended Session (53+ minutes): No     Progress Since Last Session (Related to Symptoms / Goals / Homework):   Symptoms: Improving pt has been feeling more confident and better managing his anxiety with new meds     Homework: Achieved / completed to satisfaction      Episode of Care Goals: Satisfactory progress - ACTION (Actively working towards change); Intervened by reinforcing change plan / affirming steps taken     Current / Ongoing Stressors and Concerns:   Pt is currently seeking therapy due to ongoing anxiety/depression and to better  manage his temper. Pt moved to [place] from [place] a few years ago and establishing a support system has been challenging due to regional cultural differences. Prior to moving to [place] pt has dealt with a lot of family drama and an unsupportive environment growing up, which pt is no coming to see feeds into abandonment/attachment issues and relationship patterns. Since last session, pt reported that things have been feeling better and he had a guys day which was nice. Pt got flowers from a female admirer, which was nice, and she might come to visit him in [place]. 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 Pt's ex did eventually reach out to congratulate him as well. Pt is still trying to work on giving space to her and yet his ability to give her space depends on the day. Pt did delete her from his social media accounts as well, which was a big step. Pt is now starting to accept that they won't be together again at this point and yet finds it still emotionally hurts. Pt admitted that as he gives his ex space he can see more clearly how she isn't the same person he dated initially. Pt has found that October has been easier this year then normal and thinks it is because he is focusing on himself, by engaging with friend(s) more and having gotten a dog. The next three months pt is planning to be busy with the upcoming holidays and his birthday, which he is actually looking forward to right now. Pt's work is making him switch to days in late October due to shift bids and he will be doing that for six months, which is somewhat frustrating. However, pt is feeling okay about this overall as he will likely be able to switch back to a better shift after these six months. Pt is finding his anxiety meds continue to help with his sleep, where it feels more restful and he has an easier time. Pt is taking it most nights but the improved sleep does seem  to be helping him a lot mentally. Pt is feeling wanted now, not just by others but by himself too which has been a good internal shift. Pt is catching up on his budget and agreed that finances are something he needs to focus on the next month. Pt finds that he is doing better and has come to recognize that work is a coping skill for him and is a place he feels more comfortable now.     Personal Goals:  Good Credit   license  House  Be with a partner that is invested in pt and is financially independent     Treatment Objective(s) Addressed in This Session:   identify and compliment positives at least 3 times daily to support positive self-image  identify triggers, thoughts, and current stressors which contribute to feelings of anxiety  identify patterns of escalation  (i.e. tightness in chest, flushed face, increased heart rate, clenched hands, etc.)  identify at least 3 techniques for intervening on the escalation  use cognitive strategies identified in therapy to challenge anxious thoughts  Decrease frequency and intensity of feeling down, depressed, hopeless  Identify negative self-talk and behaviors: challenge core beliefs, myths, and actions  identify two areas of life that you would like to have improved functioning  identify at least 3 self-care activities     Intervention:   CBT: Reviewed recently behavior patterns and environmental factors that impact them    Motivational Interviewing    MI Intervention: Expressed Empathy/Understanding, Supported Autonomy, Collaboration, Evocation, Open-ended questions, Reflections: simple and complex, Change talk (evoked), and Reframe     Change Talk Expressed by the Patient: Desire to change Ability to change Reasons to change Need to change Committment to change Activation Taking steps    Provider Response to Change Talk: E - Evoked more info from patient about behavior change, A - Affirmed patient's thoughts, decisions, or attempts at behavior change, R -  Reflected patient's change talk, and S - Summarized patient's change talk statements    Psychodynamic: Processed through internal-experiences related to interpersonal relationships  Solution Focused: Identified self-imposed barriers and how he has worked to overcome them    Assessments completed prior to visit:  PHQ2:       9/24/2024     2:19 PM 7/31/2024     1:01 PM 7/17/2024     1:45 PM 4/12/2024    12:27 PM 3/29/2024     9:30 AM 3/22/2024     1:01 PM 12/19/2023     9:58 AM   PHQ-2 ( 1999 [employer])   Q1: Little interest or pleasure in doing things 0 1 1 1 1 1 1   Q2: Feeling down, depressed or hopeless 0 1 1 1 1 1 1   PHQ-2 Score 0 2 2 2 2 2 2   Q1: Little interest or pleasure in doing things  Several days Several days Several days Several days Several days Several days   Q2: Feeling down, depressed or hopeless  Several days Several days Several days Several days Several days Several days   PHQ-2 Score  2 2 2 2 2 2     PHQ9:       4/12/2023     4:37 PM 1/11/2024     4:02 PM   PHQ-9 SCORE   PHQ-9 Total Score MyChart 5 (Mild depression) 9 (Mild depression)   PHQ-9 Total Score 5 9     GAD2:       10/30/2023    12:07 PM 12/19/2023     9:58 AM 3/20/2024    10:41 PM 3/29/2024     9:30 AM 4/5/2024     9:51 AM 7/17/2024     1:46 PM 7/31/2024     1:01 PM   BISHOP-2   Feeling nervous, anxious, or on edge 1 1 1 1 1 3 1   Not being able to stop or control worrying 1 1 1 1 1 3 1   BISHOP-2 Total Score 2 2 2 2 2 6 2        ASSESSMENT: Current Emotional / Mental Status (status of significant symptoms):   Risk status (Self / Other harm or suicidal ideation)   Patient denies current fears or concerns for personal safety.   Patient denies current or recent suicidal ideation or behaviors.   Patient denies current or recent homicidal ideation or behaviors.   Patient denies current or recent self injurious behavior or ideation.   Patient denies other safety concerns.   Patient reports there has been no change in risk factors since their last  session.     Patient reports there has been no change in protective factors since their last session.     Recommended that patient call 911 or go to the local ED should there be a change in any of these risk factors.     Appearance:   Appropriate    Eye Contact:   Good    Psychomotor Behavior: Normal    Attitude:   Cooperative  Interested Friendly Pleasant   Orientation:   All   Speech    Rate / Production: Normal/ Responsive Talkative    Volume:  Normal    Mood:    Anxious  Normal   Affect:    Appropriate    Thought Content:  Clear    Thought Form:  Coherent  Logical    Insight:    Good  and Intellectual Insight     Medication Review:   Changes to psychiatric medications, see updated Medication List in EPIC.      Medication Compliance:   Yes     Changes in Health Issues:   None reported     Chemical Use Review:   Substance Use: Chemical use reviewed, no active concerns identified      Tobacco Use: No change in amount of tobacco use since last session.  Provided encouragement to quit   Patient declined discussion at this time    Diagnosis:  1. Adjustment disorder with mixed anxiety and depressed mood        Collateral Reports Completed:   Not Applicable    PLAN: (Patient Tasks / Therapist Tasks / Other)  Make a vet appoint for new puppy  Stick to budget and work extra shifts to catch up on what behind  Put up picture of car you want to get in the near future  Focus on spending time with others and not wasting emotional energy on people that don't fill your cup    Judd Clemons [facility]                                                         ______________________________________________________________________    Individual Treatment Plan    Patient's Name: Sofi Escobedo  YOB: 1995    Date of Creation: 4/26/23  Date Treatment Plan Last Reviewed/Revised: 9/18/24    DSM5 Diagnoses: Adjustment Disorders  309.28 (F43.23) With mixed anxiety and depressed mood  Psychosocial / Contextual Factors: Pt  recently moved to [place] from [place] and has been dealing with the culture shift. Pt has also been working on trying to get himself established here which has been challenging.  PROMIS (reviewed every 90 days):   PROMIS-10 Scores        7/3/2024     2:41 PM 7/17/2024     1:47 PM 7/31/2024     1:02 PM   PROMIS-10 Total Score w/o Sub Scores   PROMIS TOTAL - SUBSCORES 26 25 26       Referral / Collaboration:  Referral to another professional/service is not indicated at this time..    Anticipated number of session for this episode of care: 9-12 sessions  Anticipation frequency of session: Weekly  Anticipated Duration of each session: 38-52 minutes  Treatment plan will be reviewed in 90 days or when goals have been changed.       MeasurableTreatment Goal(s) related to diagnosis / functional impairment(s)  Goal 1: Patient will better manage his anger/frustration, not getting as easily aggravated, and not letting comments get to him as easily.    I will know I've met my goal when I am able to not let others get to me so much.      Objective #A (Patient Action)    Patient will identify triggers, thoughts, and current stressors which contribute to feelings of anxiety  identify patterns of escalation  (i.e. tightness in chest, flushed face, increased heart rate, clenched hands, etc.)  identify at least 3 techniques for intervening on the escalation  use cognitive strategies identified in therapy to challenge anxious thoughts  Increase interest, engagement, and pleasure in doing things  Decrease frequency and intensity of feeling down, depressed, hopeless  Identify negative self-talk and behaviors: challenge core beliefs, myths, and actions  Improve concentration, focus, and mindfulness in daily activities   identify 3 coping strategies for improving mood  learn at least 3 self-regulation strategies.  Status: Continued - Date(s): 9/18/24    Intervention(s)  Therapist will assign homework related to target objective with the intent to  promote pt autonomy and better manage MH.  Facilitate increase in self-awareness  Provide psycho-education on emotion identification/regulation and coping skills  Teach/promote utilization of mindfulness skills and grounding    Goal 2: Patient will feel worthy and be able to find validation internally.     I will know I've met my goal when able to feel more complete or worthy without needing it externally.      Objective #A (Patient Action)    Status: Continued - Date(s): 9/18/24    Patient will identify and compliment positives at least 3 times daily to support positive self-image  Decrease frequency and intensity of feeling down, depressed, hopeless  Identify negative self-talk and behaviors: challenge core beliefs, myths, and actions  identify 5 traits or charcteristics you like about yourself  identify at least 3 self-care activities.    Intervention(s)  Therapist will assign homework related to target objective with the intent to promote pt autonomy and better manage MH.  Facilitate increase in self-awareness  Provide psycho-education on self-care/compassion and narrative therapy interventions  Teach/promote utilization of reframing and boundary setting    Objective #B Being more authentic with myself and others through improving my self-esteem.   Patient will participate in social activities to improve mood  learn & utilize at least 3 assertive communication skills weekly  identify 5 traits or charcteristics you like about yourself  identify at least 3 adaptive coping statements to counteract negative thoughts.    Status: Continued - Date(s): 9/18/24    Intervention(s)  Therapist will assign homework related to target objective with the intent to promote pt autonomy and better manage MH.  Facilitate increase in self-awareness  Provide psycho-education on self-esteem building and self-exploration  Teach/promote utilization of positive self-affirmations and critical observation skills    Patient has reviewed and  agreed to the above plan.      Judd Clemons, [facility]  April 26, 2023

## 2024-10-16 ENCOUNTER — DOCUMENTATION ONLY (OUTPATIENT)
Dept: BEHAVIORAL HEALTH | Facility: HOSPITAL | Age: 29
End: 2024-10-16
Payer: COMMERCIAL

## 2024-10-16 NOTE — PROGRESS NOTES
Therapist (writer) entered the virtual session at the scheduled time of the appointment. Pt did not arrive on time and therapist called pt 10 minutes into the appointment, leaving a VM prompting them to join via [employer] or call the office or reach out through [employer] if they were having an issue. Pt was informed of the date/time of their next follow-up appointment and was encouraged to attend if they were unable to make today's appointment and if they couldn't make it, to call 24 hours in advance to cancel it. Therapist waited an addition 10-15 minutes for pt to join and when they did not the session was cancelled.

## 2024-10-23 ENCOUNTER — DOCUMENTATION ONLY (OUTPATIENT)
Dept: BEHAVIORAL HEALTH | Facility: HOSPITAL | Age: 29
End: 2024-10-23
Payer: COMMERCIAL

## 2024-10-23 NOTE — PROGRESS NOTES
"Therapist (writer) entered the virtual session at the scheduled time of the appointment. Pt did not arrive on time and therapist called pt 10 minutes into the appointment, leaving a VM prompting them to join via [employer] or call the office or reach out through [employer] if they were having an issue. Pt was informed of the date/time of their next follow-up appointment and was encouraged to attend if they were unable to make today's appointment and if they couldn't make it, to call 24 hours in advance to cancel it. Pt was also informed that if they \"no showed\" a third time in a row, they would need to be discharged as per policy. Therapist waited an addition 10-15 minutes for pt to join and when they did not the session was cancelled.   "

## 2024-10-28 ENCOUNTER — OFFICE VISIT (OUTPATIENT)
Dept: UROLOGY | Facility: CLINIC | Age: 29
End: 2024-10-28
Payer: COMMERCIAL

## 2024-10-28 VITALS
HEART RATE: 74 BPM | OXYGEN SATURATION: 97 % | DIASTOLIC BLOOD PRESSURE: 94 MMHG | HEIGHT: 69 IN | SYSTOLIC BLOOD PRESSURE: 162 MMHG | BODY MASS INDEX: 36.29 KG/M2 | WEIGHT: 245 LBS

## 2024-10-28 DIAGNOSIS — R82.998 CRYSTALLURIA: ICD-10-CM

## 2024-10-28 DIAGNOSIS — R31.0 GROSS HEMATURIA: Primary | ICD-10-CM

## 2024-10-28 LAB
ALBUMIN UR-MCNC: ABNORMAL MG/DL
APPEARANCE UR: CLEAR
BILIRUB UR QL STRIP: NEGATIVE
COLOR UR AUTO: YELLOW
GLUCOSE UR STRIP-MCNC: NEGATIVE MG/DL
HGB UR QL STRIP: NEGATIVE
KETONES UR STRIP-MCNC: ABNORMAL MG/DL
LEUKOCYTE ESTERASE UR QL STRIP: NEGATIVE
NITRATE UR QL: NEGATIVE
PH UR STRIP: 6 [PH] (ref 5–7)
SP GR UR STRIP: 1.02 (ref 1–1.03)
UROBILINOGEN UR STRIP-ACNC: 1 E.U./DL

## 2024-10-28 PROCEDURE — 52000 CYSTOURETHROSCOPY: CPT | Performed by: STUDENT IN AN ORGANIZED HEALTH CARE EDUCATION/TRAINING PROGRAM

## 2024-10-28 PROCEDURE — 81003 URINALYSIS AUTO W/O SCOPE: CPT | Mod: QW | Performed by: STUDENT IN AN ORGANIZED HEALTH CARE EDUCATION/TRAINING PROGRAM

## 2024-10-28 PROCEDURE — 99213 OFFICE O/P EST LOW 20 MIN: CPT | Mod: 25 | Performed by: STUDENT IN AN ORGANIZED HEALTH CARE EDUCATION/TRAINING PROGRAM

## 2024-10-28 RX ORDER — LIDOCAINE HYDROCHLORIDE 20 MG/ML
JELLY TOPICAL ONCE
Status: COMPLETED | OUTPATIENT
Start: 2024-10-28 | End: 2024-10-28

## 2024-10-28 RX ADMIN — LIDOCAINE HYDROCHLORIDE 5 ML: 20 JELLY TOPICAL at 13:36

## 2024-10-28 ASSESSMENT — PAIN SCALES - GENERAL: PAINLEVEL_OUTOF10: NO PAIN (0)

## 2024-10-28 NOTE — PATIENT INSTRUCTIONS
"Discussed stone prevention diet. In particular, protein upper limit is <1.3 g/kg/day (so for him being 111 kg, less than 144 g/day as an absolute upper limit)      AFTER YOUR CYSTOSCOPY  ?  ?  You have just completed a cystoscopy, or \"cysto\", which allowed your physician to learn more about your bladder (or to remove a stent placed after surgery). We suggest that you continue to avoid caffeine, fruit juice, and alcohol for the next 24 hours, however, you are encouraged to return to your normal activities.  ?  ?  A few things that are considered normal after your cystoscopy:  ?  * small amount of bleeding (or spotting) that clears within the next 24 hours  ?  * slight burning sensation with urination  ?  * sensation of needing to void (urinate) more frequently  ?  * the feeling of \"air\" in your urine  ?  * mild discomfort that is relieved with Tylenol    * bladder spasms  ?  ?  ?  Please contact our office promptly if you:  ?  * develop a fever above 101 degrees  ?  * are unable to urinate  ?  * develop bright red blood that does not stop  ?  * experience severe pain or swelling  ?  ?  ?  And of course, please contact our office with any concerns or questions [phone].  ?   "

## 2024-10-28 NOTE — LETTER
"10/28/2024       RE: Sofi Escobedo  [address]  [facility] [address]     Dear Colleague,    Thank you for referring your patient, Sofi Escobedo, to the [facility] at [facility]. Please see a copy of my visit note below.    CHIEF COMPLAINT   Sofi Escobedo who is a 28 year old male returns today for follow-up of gross hematuria.      HPI   Sofi Escobedo is a 28 year old male returns today for follow-up of gross hematuria, h/o UTI    He denies any further gross hematuria at this time    PHYSICAL EXAM  Patient is a 28 year old  male   Vitals: Blood pressure (!) 162/94, pulse 74, height 1.753 m (5' 9\"), weight 111.1 kg (245 lb), SpO2 97%.  Body mass index is 36.18 kg/m .  General Appearance Adult:   Alert, no acute distress, oriented  HENT: throat/mouth:normal, good dentition  Lungs: no respiratory distress, or pursed lip breathing  Heart: No obvious jugular venous distension present  Abdomen: nondistended  Musculoskeltal: extremities normal, no peripheral edema  Skin: no suspicious lesions or rashes  Neuro: Alert, oriented, speech and mentation normal  Psych: affect and mood normal  Gait: Normal  : uncirc phallus  Orthotopic and patent meatus    All pertinent imaging reviewed:    Ct urogram 8/28/2024      IMPRESSION:     1.  No urinary system calculi, hydronephrosis, renal masses or  urothelial lesions. No CT evident cause for hematuria.      Cytology 9/24/2024    Final Diagnosis   Specimen A                 Interpretation:                  Negative for High Grade Urothelial Carcinoma                 Other Findings:                  Crystals present.                 Adequacy:                 Satisfactory for evaluation          PRE-PROCEDURE DIAGNOSIS: gross hematuria    POST-PROCEDURE DIAGNOSIS: gross hematuria    PROCEDURE: Cystoscopy    DESCRIPTION OF PROCEDURE: After informed consent " was obtained, the patient was brought to the procedure room where he was placed in the supine position with all pressure points well padded.  The penis was prepped and draped in sterile fashion. A flexible cystoscope was introduced through a well-lubricated urethra.     Anterior urethra normal  Prostatic urethra short without any obstruction  Unremarkable bladder without trabeculation, cellules or diverticuli  No bladder stones  No bladder tumors or raised erythema    The flexible cystoscope was removed and the findings were described to the patient.       ASSESSMENT and PLAN  28 year old male returns today for follow-up of gross hematuria, h/o UTI    No obvious source of gross hematuria on cystoscopy or on CT scan. Note that the UA with micro in August did show >100 rbc/hpf. He denies trauma, viral illness, h/o stones, flank pain. Urine cytology with crystalluria. He does eat a lot of protein supplements. Discussed that even though there are no stones on the imaging, he could be at risk for forming stones and may have passed a stone prior to the imaging being done (and could be passing tiny crystals as well, though I don't see any on cysto). Discussed stone prevention diet. In particular, protein upper limit is <1.3 g/kg/day (so for him being 111 kg, less than 144 g/day as an absolute upper limit)    - crystalluria - discussed stone prevention diet see above  - follow up with urology as needed if gross hematuria recurs. Likely would at least re-image, potentially repeat cysto      Dylan Walters MD   [facility]  [facility] Phone: [phone]      Again, thank you for allowing me to participate in the care of your patient.      Sincerely,    Dylan Walters MD

## 2024-10-28 NOTE — PROGRESS NOTES
"CHIEF COMPLAINT   Sofi Escobedo who is a 28 year old male returns today for follow-up of gross hematuria.      HPI   Sofi Escobedo is a 28 year old male returns today for follow-up of gross hematuria, h/o UTI    He denies any further gross hematuria at this time    PHYSICAL EXAM  Patient is a 28 year old  male   Vitals: Blood pressure (!) 162/94, pulse 74, height 1.753 m (5' 9\"), weight 111.1 kg (245 lb), SpO2 97%.  Body mass index is 36.18 kg/m .  General Appearance Adult:   Alert, no acute distress, oriented  HENT: throat/mouth:normal, good dentition  Lungs: no respiratory distress, or pursed lip breathing  Heart: No obvious jugular venous distension present  Abdomen: nondistended  Musculoskeltal: extremities normal, no peripheral edema  Skin: no suspicious lesions or rashes  Neuro: Alert, oriented, speech and mentation normal  Psych: affect and mood normal  Gait: Normal  : uncirc phallus  Orthotopic and patent meatus    All pertinent imaging reviewed:    Ct urogram 8/28/2024      IMPRESSION:     1.  No urinary system calculi, hydronephrosis, renal masses or  urothelial lesions. No CT evident cause for hematuria.      Cytology 9/24/2024    Final Diagnosis   Specimen A                 Interpretation:                  Negative for High Grade Urothelial Carcinoma                 Other Findings:                  Crystals present.                 Adequacy:                 Satisfactory for evaluation          PRE-PROCEDURE DIAGNOSIS: gross hematuria    POST-PROCEDURE DIAGNOSIS: gross hematuria    PROCEDURE: Cystoscopy    DESCRIPTION OF PROCEDURE: After informed consent was obtained, the patient was brought to the procedure room where he was placed in the supine position with all pressure points well padded.  The penis was prepped and draped in sterile fashion. A flexible cystoscope was introduced through a well-lubricated urethra.     Anterior urethra normal  Prostatic urethra short without any " obstruction  Unremarkable bladder without trabeculation, cellules or diverticuli  No bladder stones  No bladder tumors or raised erythema    The flexible cystoscope was removed and the findings were described to the patient.       ASSESSMENT and PLAN  28 year old male returns today for follow-up of gross hematuria, h/o UTI    No obvious source of gross hematuria on cystoscopy or on CT scan. Note that the UA with micro in August did show >100 rbc/hpf. He denies trauma, viral illness, h/o stones, flank pain. Urine cytology with crystalluria. He does eat a lot of protein supplements. Discussed that even though there are no stones on the imaging, he could be at risk for forming stones and may have passed a stone prior to the imaging being done (and could be passing tiny crystals as well, though I don't see any on cysto). Discussed stone prevention diet. In particular, protein upper limit is <1.3 g/kg/day (so for him being 111 kg, less than 144 g/day as an absolute upper limit)    - crystalluria - discussed stone prevention diet see above  - follow up with urology as needed if gross hematuria recurs. Likely would at least re-image, potentially repeat cysto      Dylan Walters MD   [facility]  [facility] Phone: [phone]

## 2024-10-30 ENCOUNTER — DOCUMENTATION ONLY (OUTPATIENT)
Dept: BEHAVIORAL HEALTH | Facility: HOSPITAL | Age: 29
End: 2024-10-30
Payer: COMMERCIAL

## 2024-10-30 NOTE — PROGRESS NOTES
Therapist (writer) entered the virtual session at the scheduled time of the appointment. Pt did not arrive on time and therapist called pt 10 minutes into the appointment, leaving a VM prompting them to join via [employer] or call the office or reach out through [employer] if they were having an issue. Pt was informed that if he was not able to connect with therapist today, they would be discharged as per policy. Pt was informed they could return to therapy in the future if they wanted. Therapist waited an addition 10-15 minutes for pt to join and when they did not the session was cancelled.

## 2024-10-30 NOTE — PROGRESS NOTES
Discharge Summary  Multiple Sessions    Client Name: Sofi Escobedo MRN#: [id number] YOB: 1995      Intake / Discharge Date: 4/13/23 - 10/30/24      DSM5 Diagnoses: (Sustained by DSM5 Criteria Listed Above)  Diagnoses: Adjustment Disorders  309.28 (F43.23) With mixed anxiety and depressed mood  Psychosocial & Contextual Factors: CIS-male, Heterosexual, , Single, no kids, employed full-time, live independently/alone, hx of heavier alcohol use, moved to [place] from [place]. Slowly adjusting to regional culture shift. Still finding a community for himself and developing a social support system. Hx of family stressors, likely manifest now as attachment issues due to complicated childhood relationship with parents. Family lives out of state back in [place].  PROMIS Score:   PROMIS-10 Scores        7/3/2024     2:41 PM 7/17/2024     1:47 PM 7/31/2024     1:02 PM   PROMIS-10 Total Score w/o Sub Scores   PROMIS TOTAL - SUBSCORES 26 25 26     Presenting Concern:  Pt initially sought therapy due to ongoing anxiety/depression symptoms, mostly related to interpersonal relationships, and to better manage his temper/irritability. At time of discharge pt's temper/irritability was no longer an active concern. Pt moved to [place] from [place] a few years ago (~2021/2022) and establishing a support system has been challenging due to regional cultural differences. Prior to moving to [place] pt has dealt with a lot of family drama and an unsupportive environment growing up, which pt has come to realize feeds into abandonment/attachment issues and unhelpful relationship patterns he falls into. Pt also started working on self-confidence after a bad break-up and starting a new job due to leaving a bad one, which had started to improve. Pt was also able to identify that financial stressors was another area he found to be a source of anxiety for him. Pt started anti-anxiety meds which he found were very  helpful, especially with improving his sleep at night. Pt also got a dog recently which was another support he found to be helpful with managing/improving his mood.    Reason for Discharge:  Client did not return      Disposition at Time of Last Encounter:   Comments:   Pt appeared dedicated to therapy and was had developed much improved insight, including being more established socially and reducing overall alcohol intake to help with emotions. Pt sudden decision to not attend therapy without cancelling was a surprise. Pt is welcome to restart therapy as long as he knows there will be a strict three no-show discharge, as per policy, in place beforehand.      Risk Management:   Client denies a history of suicidal ideation, suicide attempts, self-injurious behavior, homicidal ideation, homicidal behavior, and and other safety concerns  Recommended that patient call 911 or go to the local ED should there be a change in any of these risk factors      [facility]   10/30/2024

## 2024-11-13 DIAGNOSIS — I10 ESSENTIAL HYPERTENSION: ICD-10-CM

## 2024-11-13 RX ORDER — AMLODIPINE BESYLATE 10 MG/1
10 TABLET ORAL DAILY
Qty: 90 TABLET | Refills: 1 | Status: SHIPPED | OUTPATIENT
Start: 2024-11-13

## 2024-11-15 ENCOUNTER — LAB (OUTPATIENT)
Dept: LAB | Facility: CLINIC | Age: 29
End: 2024-11-15
Payer: COMMERCIAL

## 2024-11-15 DIAGNOSIS — Z11.3 SCREEN FOR STD (SEXUALLY TRANSMITTED DISEASE): Primary | ICD-10-CM

## 2024-11-15 DIAGNOSIS — Z11.3 SCREEN FOR STD (SEXUALLY TRANSMITTED DISEASE): ICD-10-CM

## 2024-11-15 LAB
HIV 1+2 AB+HIV1 P24 AG SERPL QL IA: NONREACTIVE
T PALLIDUM AB SER QL: NONREACTIVE

## 2024-11-15 PROCEDURE — 87591 N.GONORRHOEAE DNA AMP PROB: CPT

## 2024-11-15 PROCEDURE — 87491 CHLMYD TRACH DNA AMP PROBE: CPT

## 2024-11-15 PROCEDURE — 36415 COLL VENOUS BLD VENIPUNCTURE: CPT

## 2024-11-15 PROCEDURE — 86780 TREPONEMA PALLIDUM: CPT

## 2024-11-15 PROCEDURE — 87389 HIV-1 AG W/HIV-1&-2 AB AG IA: CPT

## 2024-11-16 LAB
C TRACH DNA SPEC QL PROBE+SIG AMP: NEGATIVE
N GONORRHOEA DNA SPEC QL NAA+PROBE: NEGATIVE

## 2024-12-06 ENCOUNTER — OFFICE VISIT (OUTPATIENT)
Dept: FAMILY MEDICINE | Facility: CLINIC | Age: 29
End: 2024-12-06
Payer: COMMERCIAL

## 2024-12-06 VITALS
HEART RATE: 75 BPM | HEIGHT: 70 IN | WEIGHT: 260 LBS | BODY MASS INDEX: 37.22 KG/M2 | TEMPERATURE: 97.5 F | OXYGEN SATURATION: 100 % | DIASTOLIC BLOOD PRESSURE: 119 MMHG | RESPIRATION RATE: 21 BRPM | SYSTOLIC BLOOD PRESSURE: 167 MMHG

## 2024-12-06 DIAGNOSIS — I10 PRIMARY HYPERTENSION: ICD-10-CM

## 2024-12-06 DIAGNOSIS — Z11.3 SCREEN FOR STD (SEXUALLY TRANSMITTED DISEASE): ICD-10-CM

## 2024-12-06 DIAGNOSIS — Z00.00 ROUTINE GENERAL MEDICAL EXAMINATION AT A HEALTH CARE FACILITY: Primary | ICD-10-CM

## 2024-12-06 DIAGNOSIS — Z31.69 INFERTILITY COUNSELING: ICD-10-CM

## 2024-12-06 DIAGNOSIS — Z23 NEED FOR VACCINATION: ICD-10-CM

## 2024-12-06 DIAGNOSIS — I10 ESSENTIAL HYPERTENSION: ICD-10-CM

## 2024-12-06 DIAGNOSIS — Z11.3 ROUTINE SCREENING FOR STI (SEXUALLY TRANSMITTED INFECTION): ICD-10-CM

## 2024-12-06 DIAGNOSIS — D56.9 THALASSEMIA, UNSPECIFIED TYPE: ICD-10-CM

## 2024-12-06 DIAGNOSIS — F41.9 ANXIETY: ICD-10-CM

## 2024-12-06 DIAGNOSIS — F51.04 PSYCHOPHYSIOLOGICAL INSOMNIA: ICD-10-CM

## 2024-12-06 LAB
BASOPHILS # BLD AUTO: 0 10E3/UL (ref 0–0.2)
BASOPHILS NFR BLD AUTO: 0 %
EOSINOPHIL # BLD AUTO: 0 10E3/UL (ref 0–0.7)
EOSINOPHIL NFR BLD AUTO: 1 %
ERYTHROCYTE [DISTWIDTH] IN BLOOD BY AUTOMATED COUNT: 18.2 % (ref 10–15)
FERRITIN SERPL-MCNC: 133 NG/ML (ref 31–409)
FOLATE SERPL-MCNC: 10.1 NG/ML (ref 4.6–34.8)
HCT VFR BLD AUTO: 37.6 % (ref 40–53)
HGB BLD-MCNC: 12 G/DL (ref 13.3–17.7)
HIV 1+2 AB+HIV1 P24 AG SERPL QL IA: NONREACTIVE
IMM GRANULOCYTES # BLD: 0 10E3/UL
IMM GRANULOCYTES NFR BLD: 0 %
IRON BINDING CAPACITY (ROCHE): 328 UG/DL (ref 240–430)
IRON SATN MFR SERPL: 24 % (ref 15–46)
IRON SERPL-MCNC: 80 UG/DL (ref 61–157)
LYMPHOCYTES # BLD AUTO: 2.8 10E3/UL (ref 0.8–5.3)
LYMPHOCYTES NFR BLD AUTO: 44 %
MCH RBC QN AUTO: 21.8 PG (ref 26.5–33)
MCHC RBC AUTO-ENTMCNC: 31.9 G/DL (ref 31.5–36.5)
MCV RBC AUTO: 68 FL (ref 78–100)
MONOCYTES # BLD AUTO: 0.6 10E3/UL (ref 0–1.3)
MONOCYTES NFR BLD AUTO: 9 %
NEUTROPHILS # BLD AUTO: 2.8 10E3/UL (ref 1.6–8.3)
NEUTROPHILS NFR BLD AUTO: 46 %
PLATELET # BLD AUTO: 199 10E3/UL (ref 150–450)
RBC # BLD AUTO: 5.5 10E6/UL (ref 4.4–5.9)
T PALLIDUM AB SER QL: NONREACTIVE
VIT B12 SERPL-MCNC: 863 PG/ML (ref 232–1245)
WBC # BLD AUTO: 6.3 10E3/UL (ref 4–11)

## 2024-12-06 PROCEDURE — 82088 ASSAY OF ALDOSTERONE: CPT | Performed by: FAMILY MEDICINE

## 2024-12-06 PROCEDURE — 99000 SPECIMEN HANDLING OFFICE-LAB: CPT | Performed by: FAMILY MEDICINE

## 2024-12-06 PROCEDURE — 85025 COMPLETE CBC W/AUTO DIFF WBC: CPT | Performed by: FAMILY MEDICINE

## 2024-12-06 PROCEDURE — 90471 IMMUNIZATION ADMIN: CPT | Performed by: FAMILY MEDICINE

## 2024-12-06 PROCEDURE — 90656 IIV3 VACC NO PRSV 0.5 ML IM: CPT | Performed by: FAMILY MEDICINE

## 2024-12-06 PROCEDURE — 87591 N.GONORRHOEAE DNA AMP PROB: CPT | Performed by: FAMILY MEDICINE

## 2024-12-06 PROCEDURE — 87389 HIV-1 AG W/HIV-1&-2 AB AG IA: CPT | Performed by: FAMILY MEDICINE

## 2024-12-06 PROCEDURE — 83020 HEMOGLOBIN ELECTROPHORESIS: CPT | Mod: 90 | Performed by: FAMILY MEDICINE

## 2024-12-06 PROCEDURE — 82607 VITAMIN B-12: CPT | Performed by: FAMILY MEDICINE

## 2024-12-06 PROCEDURE — 90715 TDAP VACCINE 7 YRS/> IM: CPT | Performed by: FAMILY MEDICINE

## 2024-12-06 PROCEDURE — 90480 ADMN SARSCOV2 VAC 1/ONLY CMP: CPT | Performed by: FAMILY MEDICINE

## 2024-12-06 PROCEDURE — 90472 IMMUNIZATION ADMIN EACH ADD: CPT | Performed by: FAMILY MEDICINE

## 2024-12-06 PROCEDURE — 83021 HEMOGLOBIN CHROMOTOGRAPHY: CPT | Mod: 90 | Performed by: FAMILY MEDICINE

## 2024-12-06 PROCEDURE — 82728 ASSAY OF FERRITIN: CPT | Performed by: FAMILY MEDICINE

## 2024-12-06 PROCEDURE — 86780 TREPONEMA PALLIDUM: CPT | Performed by: FAMILY MEDICINE

## 2024-12-06 PROCEDURE — 83540 ASSAY OF IRON: CPT | Performed by: FAMILY MEDICINE

## 2024-12-06 PROCEDURE — 99395 PREV VISIT EST AGE 18-39: CPT | Mod: 25 | Performed by: FAMILY MEDICINE

## 2024-12-06 PROCEDURE — 91320 SARSCV2 VAC 30MCG TRS-SUC IM: CPT | Performed by: FAMILY MEDICINE

## 2024-12-06 PROCEDURE — 84244 ASSAY OF RENIN: CPT | Mod: 90 | Performed by: FAMILY MEDICINE

## 2024-12-06 PROCEDURE — 85660 RBC SICKLE CELL TEST: CPT | Mod: 90 | Performed by: FAMILY MEDICINE

## 2024-12-06 PROCEDURE — 99214 OFFICE O/P EST MOD 30 MIN: CPT | Mod: 25 | Performed by: FAMILY MEDICINE

## 2024-12-06 PROCEDURE — 87491 CHLMYD TRACH DNA AMP PROBE: CPT | Performed by: FAMILY MEDICINE

## 2024-12-06 PROCEDURE — 83550 IRON BINDING TEST: CPT | Performed by: FAMILY MEDICINE

## 2024-12-06 PROCEDURE — 36415 COLL VENOUS BLD VENIPUNCTURE: CPT | Performed by: FAMILY MEDICINE

## 2024-12-06 PROCEDURE — 82746 ASSAY OF FOLIC ACID SERUM: CPT | Performed by: FAMILY MEDICINE

## 2024-12-06 RX ORDER — PRAZOSIN HYDROCHLORIDE 1 MG/1
1-2 CAPSULE ORAL AT BEDTIME
Qty: 60 CAPSULE | Refills: 2 | Status: SHIPPED | OUTPATIENT
Start: 2024-12-06

## 2024-12-06 RX ORDER — LOSARTAN POTASSIUM 50 MG/1
50 TABLET ORAL DAILY
Qty: 30 TABLET | Refills: 5 | Status: SHIPPED | OUTPATIENT
Start: 2024-12-06

## 2024-12-06 RX ORDER — AMLODIPINE BESYLATE 10 MG/1
10 TABLET ORAL DAILY
Qty: 90 TABLET | Refills: 1 | Status: SHIPPED | OUTPATIENT
Start: 2024-12-06

## 2024-12-06 RX ORDER — HYDROXYZINE PAMOATE 25 MG/1
25-50 CAPSULE ORAL 3 TIMES DAILY PRN
Qty: 90 CAPSULE | Refills: 11 | Status: SHIPPED | OUTPATIENT
Start: 2024-12-06

## 2024-12-06 SDOH — HEALTH STABILITY: PHYSICAL HEALTH: ON AVERAGE, HOW MANY MINUTES DO YOU ENGAGE IN EXERCISE AT THIS LEVEL?: 70 MIN

## 2024-12-06 SDOH — HEALTH STABILITY: PHYSICAL HEALTH: ON AVERAGE, HOW MANY DAYS PER WEEK DO YOU ENGAGE IN MODERATE TO STRENUOUS EXERCISE (LIKE A BRISK WALK)?: 5 DAYS

## 2024-12-06 ASSESSMENT — SOCIAL DETERMINANTS OF HEALTH (SDOH): HOW OFTEN DO YOU GET TOGETHER WITH FRIENDS OR RELATIVES?: ONCE A WEEK

## 2024-12-06 ASSESSMENT — PAIN SCALES - GENERAL: PAINLEVEL_OUTOF10: NO PAIN (0)

## 2024-12-07 LAB
C TRACH DNA SPEC QL PROBE+SIG AMP: NEGATIVE
N GONORRHOEA DNA SPEC QL NAA+PROBE: NEGATIVE

## 2024-12-10 LAB
ALDOST SERPL-MCNC: 4.5 NG/DL (ref 0–31)
ALPHA GLOBIN (HBA1 AND HBA2) DD BILL REFLEX BILL: NORMAL
HGB A1 MFR BLD: 87 %
HGB A2 MFR BLD: 8.2 %
HGB C MFR BLD: 0 %
HGB E MFR BLD: 0 %
HGB F MFR BLD: 4.8 %
HGB FRACT BLD ELPH-IMP: ABNORMAL
HGB OTHER MFR BLD: 0 %
HGB S BLD QL SOLY: ABNORMAL
HGB S MFR BLD: 0 %
PATH INTERP BLD-IMP: ABNORMAL

## 2024-12-12 LAB
ALDOST/RENIN PLAS-RTO: NORMAL {RATIO}
RENIN PLAS-CCNC: <0.1 NG/ML/HR

## 2025-01-08 ENCOUNTER — E-CONSULT (OUTPATIENT)
Dept: HEMATOLOGY | Facility: CLINIC | Age: 30
End: 2025-01-08
Payer: COMMERCIAL

## 2025-01-08 NOTE — PROGRESS NOTES
1/8/2025     E-Consult has been accepted.    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  Asymptomatic for low grade anemia. Hypertension.  Is there anything else I should be doing or do you want to see him?      Patient assessment and information reviewed: 29 year old man with chronic mild microcytic anemia, consistent with non-transfusion dependent thalassemia.    Recommendations: No need for hematology referral. If the patient wants more information about inheritance and screening for family planning, a referral to genetic counselor would be indicated.       The recommendations provided in this E-Consult are based on a review of clinical data pertinent to the clinical question presented, without a review of the patient's complete medical record or, the benefit of a comprehensive in-person or virtual patient evaluation. This consultation should not replace the clinical judgement and evaluation of the provider ordering this E-Consult. Any new clinical issues, or changes in patient status since the filing of this E-Consult will need to be taken into account when assessing these recommendations. Please contact me if you have further questions.    My total time spent reviewing clinical information and formulating assessment was 5 minutes.        Rosalino Corbett MD

## 2025-01-16 ENCOUNTER — TELEPHONE (OUTPATIENT)
Dept: CONSULT | Facility: CLINIC | Age: 30
End: 2025-01-16
Payer: COMMERCIAL

## 2025-01-16 NOTE — TELEPHONE ENCOUNTER
LVM for Zacchaeus to call back to schedule GC only visit with any GC. My direct number provided. Will also send message via [employer].